# Patient Record
Sex: FEMALE | Race: WHITE | NOT HISPANIC OR LATINO | Employment: UNEMPLOYED | URBAN - METROPOLITAN AREA
[De-identification: names, ages, dates, MRNs, and addresses within clinical notes are randomized per-mention and may not be internally consistent; named-entity substitution may affect disease eponyms.]

---

## 2018-02-12 ENCOUNTER — OFFICE VISIT CONVERTED (OUTPATIENT)
Dept: FAMILY MEDICINE CLINIC | Facility: CLINIC | Age: 53
End: 2018-02-12
Attending: NURSE PRACTITIONER

## 2018-02-12 ENCOUNTER — CONVERSION ENCOUNTER (OUTPATIENT)
Dept: FAMILY MEDICINE CLINIC | Facility: CLINIC | Age: 53
End: 2018-02-12

## 2018-08-14 ENCOUNTER — OFFICE VISIT CONVERTED (OUTPATIENT)
Dept: FAMILY MEDICINE CLINIC | Facility: CLINIC | Age: 53
End: 2018-08-14
Attending: NURSE PRACTITIONER

## 2018-08-14 ENCOUNTER — CONVERSION ENCOUNTER (OUTPATIENT)
Dept: FAMILY MEDICINE CLINIC | Facility: CLINIC | Age: 53
End: 2018-08-14

## 2018-11-15 ENCOUNTER — OFFICE VISIT CONVERTED (OUTPATIENT)
Dept: FAMILY MEDICINE CLINIC | Facility: CLINIC | Age: 53
End: 2018-11-15
Attending: NURSE PRACTITIONER

## 2019-02-12 ENCOUNTER — HOSPITAL ENCOUNTER (OUTPATIENT)
Dept: LAB | Facility: HOSPITAL | Age: 54
Discharge: HOME | End: 2019-02-12
Attending: NURSE PRACTITIONER

## 2019-02-12 LAB
ALBUMIN SERPL-MCNC: 4.2 G/DL (ref 3.5–5)
ALBUMIN/GLOB SERPL: 1.6 {RATIO} (ref 1.4–2.6)
ALP SERPL-CCNC: 63 U/L (ref 53–141)
ALT SERPL-CCNC: 15 U/L (ref 10–40)
ANION GAP SERPL CALC-SCNC: 17 MMOL/L (ref 8–19)
AST SERPL-CCNC: 14 U/L (ref 15–50)
BASOPHILS # BLD AUTO: 0.02 10*3/UL (ref 0–0.2)
BASOPHILS NFR BLD AUTO: 0.44 % (ref 0–3)
BILIRUB SERPL-MCNC: 0.37 MG/DL (ref 0.2–1.3)
BUN SERPL-MCNC: 12 MG/DL (ref 5–25)
BUN/CREAT SERPL: 13 {RATIO} (ref 6–20)
CALCIUM SERPL-MCNC: 9.3 MG/DL (ref 8.7–10.4)
CHLORIDE SERPL-SCNC: 107 MMOL/L (ref 99–111)
CHOLEST SERPL-MCNC: 166 MG/DL (ref 107–200)
CHOLEST/HDLC SERPL: 3 {RATIO} (ref 3–6)
CONV CO2: 23 MMOL/L (ref 22–32)
CONV CREATININE URINE, RANDOM: 83.7 MG/DL (ref 10–300)
CONV MICROALBUM.,U,RANDOM: <12 MG/L (ref 0–20)
CONV TOTAL PROTEIN: 6.9 G/DL (ref 6.3–8.2)
CREAT UR-MCNC: 0.95 MG/DL (ref 0.5–0.9)
EOSINOPHIL # BLD AUTO: 0.13 10*3/UL (ref 0–0.7)
EOSINOPHIL # BLD AUTO: 2.35 % (ref 0–7)
ERYTHROCYTE [DISTWIDTH] IN BLOOD BY AUTOMATED COUNT: 11.6 % (ref 11.5–14.5)
EST. AVERAGE GLUCOSE BLD GHB EST-MCNC: 143 MG/DL
GFR SERPLBLD BASED ON 1.73 SQ M-ARVRAT: >60 ML/MIN/{1.73_M2}
GLOBULIN UR ELPH-MCNC: 2.7 G/DL (ref 2–3.5)
GLUCOSE SERPL-MCNC: 112 MG/DL (ref 65–99)
HBA1C MFR BLD: 14.4 G/DL (ref 12–16)
HBA1C MFR BLD: 6.6 % (ref 3.5–5.7)
HCT VFR BLD AUTO: 42.8 % (ref 37–47)
HDLC SERPL-MCNC: 55 MG/DL (ref 40–60)
LDLC SERPL CALC-MCNC: 96 MG/DL (ref 70–100)
LYMPHOCYTES # BLD AUTO: 2.08 10*3/UL (ref 1–5)
MCH RBC QN AUTO: 30.5 PG (ref 27–31)
MCHC RBC AUTO-ENTMCNC: 33.7 G/DL (ref 33–37)
MCV RBC AUTO: 90.6 FL (ref 81–99)
MICROALBUMIN/CREAT UR: 14.3 MG/G{CRE} (ref 0–35)
MONOCYTES # BLD AUTO: 0.45 10*3/UL (ref 0.2–1.2)
MONOCYTES NFR BLD AUTO: 8.4 % (ref 3–10)
NEUTROPHILS # BLD AUTO: 2.67 10*3/UL (ref 2–8)
NEUTROPHILS NFR BLD AUTO: 49.9 % (ref 30–85)
NRBC BLD AUTO-RTO: 0 % (ref 0–0.01)
OSMOLALITY SERPL CALC.SUM OF ELEC: 295 MOSM/KG (ref 273–304)
PLATELET # BLD AUTO: 244 10*3/UL (ref 130–400)
PMV BLD AUTO: 7.1 FL (ref 7.4–10.4)
POTASSIUM SERPL-SCNC: 4.7 MMOL/L (ref 3.5–5.3)
RBC # BLD AUTO: 4.73 10*6/UL (ref 4.2–5.4)
SODIUM SERPL-SCNC: 142 MMOL/L (ref 135–147)
T4 FREE SERPL-MCNC: 1.3 NG/DL (ref 0.9–1.8)
TRIGL SERPL-MCNC: 77 MG/DL (ref 40–150)
TSH SERPL-ACNC: 1.67 M[IU]/L (ref 0.27–4.2)
VARIANT LYMPHS NFR BLD MANUAL: 38.9 % (ref 20–45)
VLDLC SERPL-MCNC: 15 MG/DL (ref 5–37)
WBC # BLD AUTO: 5.35 10*3/UL (ref 4.8–10.8)

## 2019-02-13 ENCOUNTER — CONVERSION ENCOUNTER (OUTPATIENT)
Dept: FAMILY MEDICINE CLINIC | Facility: CLINIC | Age: 54
End: 2019-02-13

## 2019-02-13 ENCOUNTER — OFFICE VISIT CONVERTED (OUTPATIENT)
Dept: FAMILY MEDICINE CLINIC | Facility: CLINIC | Age: 54
End: 2019-02-13
Attending: NURSE PRACTITIONER

## 2019-05-13 ENCOUNTER — HOSPITAL ENCOUNTER (OUTPATIENT)
Dept: LAB | Facility: HOSPITAL | Age: 54
Discharge: HOME | End: 2019-05-13
Attending: NURSE PRACTITIONER

## 2019-05-13 LAB
EST. AVERAGE GLUCOSE BLD GHB EST-MCNC: 128 MG/DL
HBA1C MFR BLD: 6.1 % (ref 3.5–5.7)

## 2019-05-15 ENCOUNTER — OFFICE VISIT CONVERTED (OUTPATIENT)
Dept: FAMILY MEDICINE CLINIC | Facility: CLINIC | Age: 54
End: 2019-05-15
Attending: NURSE PRACTITIONER

## 2019-07-31 ENCOUNTER — HOSPITAL ENCOUNTER (OUTPATIENT)
Dept: GENERAL RADIOLOGY | Facility: HOSPITAL | Age: 54
Discharge: HOME | End: 2019-07-31
Attending: NURSE PRACTITIONER

## 2019-08-05 ENCOUNTER — OFFICE VISIT CONVERTED (OUTPATIENT)
Dept: ORTHOPEDIC SURGERY | Facility: CLINIC | Age: 54
End: 2019-08-05
Attending: ORTHOPAEDIC SURGERY

## 2019-08-13 ENCOUNTER — HOSPITAL ENCOUNTER (OUTPATIENT)
Dept: PERIOP | Facility: HOSPITAL | Age: 54
Setting detail: HOSPITAL OUTPATIENT SURGERY
Discharge: HOME | End: 2019-08-13
Attending: ORTHOPAEDIC SURGERY

## 2019-08-13 LAB
GLUCOSE BLD-MCNC: 112 MG/DL (ref 65–99)
GLUCOSE BLD-MCNC: 121 MG/DL (ref 65–99)

## 2019-08-26 ENCOUNTER — CONVERSION ENCOUNTER (OUTPATIENT)
Dept: ORTHOPEDIC SURGERY | Facility: CLINIC | Age: 54
End: 2019-08-26

## 2019-08-26 ENCOUNTER — OFFICE VISIT CONVERTED (OUTPATIENT)
Dept: ORTHOPEDIC SURGERY | Facility: CLINIC | Age: 54
End: 2019-08-26
Attending: PHYSICIAN ASSISTANT

## 2019-09-18 ENCOUNTER — OFFICE VISIT CONVERTED (OUTPATIENT)
Dept: ORTHOPEDIC SURGERY | Facility: CLINIC | Age: 54
End: 2019-09-18
Attending: PHYSICIAN ASSISTANT

## 2019-10-09 ENCOUNTER — OFFICE VISIT CONVERTED (OUTPATIENT)
Dept: ORTHOPEDIC SURGERY | Facility: CLINIC | Age: 54
End: 2019-10-09
Attending: PHYSICIAN ASSISTANT

## 2019-10-17 ENCOUNTER — HOSPITAL ENCOUNTER (OUTPATIENT)
Dept: GENERAL RADIOLOGY | Facility: HOSPITAL | Age: 54
Discharge: HOME | End: 2019-10-17
Attending: PHYSICIAN ASSISTANT

## 2019-10-23 ENCOUNTER — OFFICE VISIT CONVERTED (OUTPATIENT)
Dept: ORTHOPEDIC SURGERY | Facility: CLINIC | Age: 54
End: 2019-10-23
Attending: PHYSICIAN ASSISTANT

## 2019-10-31 ENCOUNTER — HOSPITAL ENCOUNTER (OUTPATIENT)
Dept: PERIOP | Facility: HOSPITAL | Age: 54
Setting detail: HOSPITAL OUTPATIENT SURGERY
Discharge: HOME | End: 2019-10-31
Attending: ORTHOPAEDIC SURGERY

## 2019-10-31 LAB — GLUCOSE BLD-MCNC: 118 MG/DL (ref 65–99)

## 2019-11-13 ENCOUNTER — OFFICE VISIT CONVERTED (OUTPATIENT)
Dept: ORTHOPEDIC SURGERY | Facility: CLINIC | Age: 54
End: 2019-11-13

## 2019-11-20 ENCOUNTER — OFFICE VISIT CONVERTED (OUTPATIENT)
Dept: FAMILY MEDICINE CLINIC | Facility: CLINIC | Age: 54
End: 2019-11-20
Attending: NURSE PRACTITIONER

## 2019-11-20 ENCOUNTER — CONVERSION ENCOUNTER (OUTPATIENT)
Dept: FAMILY MEDICINE CLINIC | Facility: CLINIC | Age: 54
End: 2019-11-20

## 2019-12-11 ENCOUNTER — OFFICE VISIT CONVERTED (OUTPATIENT)
Dept: ORTHOPEDIC SURGERY | Facility: CLINIC | Age: 54
End: 2019-12-11
Attending: PHYSICIAN ASSISTANT

## 2020-01-22 ENCOUNTER — OFFICE VISIT CONVERTED (OUTPATIENT)
Dept: ORTHOPEDIC SURGERY | Facility: CLINIC | Age: 55
End: 2020-01-22
Attending: PHYSICIAN ASSISTANT

## 2020-01-28 ENCOUNTER — OFFICE VISIT CONVERTED (OUTPATIENT)
Dept: NEUROLOGY | Facility: CLINIC | Age: 55
End: 2020-01-28
Attending: PSYCHIATRY & NEUROLOGY

## 2020-01-28 ENCOUNTER — CONVERSION ENCOUNTER (OUTPATIENT)
Dept: NEUROLOGY | Facility: CLINIC | Age: 55
End: 2020-01-28

## 2020-02-19 ENCOUNTER — OFFICE VISIT CONVERTED (OUTPATIENT)
Dept: ORTHOPEDIC SURGERY | Facility: CLINIC | Age: 55
End: 2020-02-19
Attending: PHYSICIAN ASSISTANT

## 2020-04-01 ENCOUNTER — TELEMEDICINE CONVERTED (OUTPATIENT)
Dept: ORTHOPEDIC SURGERY | Facility: CLINIC | Age: 55
End: 2020-04-01
Attending: PHYSICIAN ASSISTANT

## 2020-04-29 ENCOUNTER — CONVERSION ENCOUNTER (OUTPATIENT)
Dept: ORTHOPEDIC SURGERY | Facility: CLINIC | Age: 55
End: 2020-04-29

## 2020-04-29 ENCOUNTER — TELEMEDICINE CONVERTED (OUTPATIENT)
Dept: ORTHOPEDIC SURGERY | Facility: CLINIC | Age: 55
End: 2020-04-29
Attending: PHYSICIAN ASSISTANT

## 2020-05-20 ENCOUNTER — OFFICE VISIT CONVERTED (OUTPATIENT)
Dept: ORTHOPEDIC SURGERY | Facility: CLINIC | Age: 55
End: 2020-05-20
Attending: PHYSICIAN ASSISTANT

## 2020-05-20 ENCOUNTER — HOSPITAL ENCOUNTER (OUTPATIENT)
Dept: LAB | Facility: HOSPITAL | Age: 55
Discharge: HOME | End: 2020-05-20
Attending: NURSE PRACTITIONER

## 2020-05-20 ENCOUNTER — OFFICE VISIT CONVERTED (OUTPATIENT)
Dept: FAMILY MEDICINE CLINIC | Facility: CLINIC | Age: 55
End: 2020-05-20
Attending: NURSE PRACTITIONER

## 2020-05-20 LAB
ALBUMIN SERPL-MCNC: 4.6 G/DL (ref 3.5–5)
ALBUMIN/GLOB SERPL: 1.7 {RATIO} (ref 1.4–2.6)
ALP SERPL-CCNC: 69 U/L (ref 53–141)
ALT SERPL-CCNC: 32 U/L (ref 10–40)
ANION GAP SERPL CALC-SCNC: 23 MMOL/L (ref 8–19)
AST SERPL-CCNC: 21 U/L (ref 15–50)
BASOPHILS # BLD AUTO: 0.02 10*3/UL (ref 0–0.2)
BASOPHILS NFR BLD AUTO: 0.3 % (ref 0–3)
BILIRUB SERPL-MCNC: 0.36 MG/DL (ref 0.2–1.3)
BUN SERPL-MCNC: 12 MG/DL (ref 5–25)
BUN/CREAT SERPL: 12 {RATIO} (ref 6–20)
CALCIUM SERPL-MCNC: 9.4 MG/DL (ref 8.7–10.4)
CHLORIDE SERPL-SCNC: 104 MMOL/L (ref 99–111)
CHOLEST SERPL-MCNC: 192 MG/DL (ref 107–200)
CHOLEST/HDLC SERPL: 3.5 {RATIO} (ref 3–6)
CONV ABS IMM GRAN: 0.01 10*3/UL (ref 0–0.2)
CONV CO2: 20 MMOL/L (ref 22–32)
CONV CREATININE URINE, RANDOM: 55.9 MG/DL (ref 10–300)
CONV IMMATURE GRAN: 0.2 % (ref 0–1.8)
CONV MICROALBUM.,U,RANDOM: <12 MG/L (ref 0–20)
CONV TOTAL PROTEIN: 7.3 G/DL (ref 6.3–8.2)
CREAT UR-MCNC: 1.01 MG/DL (ref 0.5–0.9)
DEPRECATED RDW RBC AUTO: 42.7 FL (ref 36.4–46.3)
EOSINOPHIL # BLD AUTO: 0.07 10*3/UL (ref 0–0.7)
EOSINOPHIL # BLD AUTO: 1.1 % (ref 0–7)
ERYTHROCYTE [DISTWIDTH] IN BLOOD BY AUTOMATED COUNT: 12.1 % (ref 11.7–14.4)
EST. AVERAGE GLUCOSE BLD GHB EST-MCNC: 131 MG/DL
GFR SERPLBLD BASED ON 1.73 SQ M-ARVRAT: >60 ML/MIN/{1.73_M2}
GLOBULIN UR ELPH-MCNC: 2.7 G/DL (ref 2–3.5)
GLUCOSE SERPL-MCNC: 104 MG/DL (ref 65–99)
HBA1C MFR BLD: 6.2 % (ref 3.5–5.7)
HCT VFR BLD AUTO: 43.8 % (ref 37–47)
HDLC SERPL-MCNC: 55 MG/DL (ref 40–60)
HGB BLD-MCNC: 13.9 G/DL (ref 12–16)
LDLC SERPL CALC-MCNC: 114 MG/DL (ref 70–100)
LYMPHOCYTES # BLD AUTO: 2.04 10*3/UL (ref 1–5)
LYMPHOCYTES NFR BLD AUTO: 32.2 % (ref 20–45)
MCH RBC QN AUTO: 30.5 PG (ref 27–31)
MCHC RBC AUTO-ENTMCNC: 31.7 G/DL (ref 33–37)
MCV RBC AUTO: 96.1 FL (ref 81–99)
MICROALBUMIN/CREAT UR: 21.5 MG/G{CRE} (ref 0–35)
MONOCYTES # BLD AUTO: 0.39 10*3/UL (ref 0.2–1.2)
MONOCYTES NFR BLD AUTO: 6.2 % (ref 3–10)
NEUTROPHILS # BLD AUTO: 3.81 10*3/UL (ref 2–8)
NEUTROPHILS NFR BLD AUTO: 60 % (ref 30–85)
NRBC CBCN: 0 % (ref 0–0.7)
OSMOLALITY SERPL CALC.SUM OF ELEC: 296 MOSM/KG (ref 273–304)
PLATELET # BLD AUTO: 240 10*3/UL (ref 130–400)
PMV BLD AUTO: 9.5 FL (ref 9.4–12.3)
POTASSIUM SERPL-SCNC: 4 MMOL/L (ref 3.5–5.3)
RBC # BLD AUTO: 4.56 10*6/UL (ref 4.2–5.4)
SODIUM SERPL-SCNC: 143 MMOL/L (ref 135–147)
T4 FREE SERPL-MCNC: 1.8 NG/DL (ref 0.9–1.8)
TRIGL SERPL-MCNC: 114 MG/DL (ref 40–150)
TSH SERPL-ACNC: 0.75 M[IU]/L (ref 0.27–4.2)
VLDLC SERPL-MCNC: 23 MG/DL (ref 5–37)
WBC # BLD AUTO: 6.34 10*3/UL (ref 4.8–10.8)

## 2020-06-22 ENCOUNTER — HOSPITAL ENCOUNTER (OUTPATIENT)
Dept: ORTHOPEDIC SURGERY | Facility: CLINIC | Age: 55
Setting detail: RECURRING SERIES
Discharge: STILL ADMITTED OR ADMINISTRATIVE | End: 2020-09-29
Attending: ORTHOPAEDIC SURGERY

## 2020-07-15 ENCOUNTER — OFFICE VISIT CONVERTED (OUTPATIENT)
Dept: ORTHOPEDIC SURGERY | Facility: CLINIC | Age: 55
End: 2020-07-15
Attending: PHYSICIAN ASSISTANT

## 2020-09-11 ENCOUNTER — OFFICE VISIT CONVERTED (OUTPATIENT)
Dept: ORTHOPEDIC SURGERY | Facility: CLINIC | Age: 55
End: 2020-09-11
Attending: ORTHOPAEDIC SURGERY

## 2020-09-11 ENCOUNTER — CONVERSION ENCOUNTER (OUTPATIENT)
Dept: ORTHOPEDIC SURGERY | Facility: CLINIC | Age: 55
End: 2020-09-11

## 2020-10-02 ENCOUNTER — OFFICE VISIT CONVERTED (OUTPATIENT)
Dept: ORTHOPEDIC SURGERY | Facility: CLINIC | Age: 55
End: 2020-10-02
Attending: PHYSICIAN ASSISTANT

## 2020-10-05 ENCOUNTER — HOSPITAL ENCOUNTER (OUTPATIENT)
Dept: ORTHOPEDIC SURGERY | Facility: CLINIC | Age: 55
Setting detail: RECURRING SERIES
Discharge: HOME | End: 2020-11-17
Attending: ORTHOPAEDIC SURGERY

## 2020-10-23 ENCOUNTER — OFFICE VISIT CONVERTED (OUTPATIENT)
Dept: ORTHOPEDIC SURGERY | Facility: CLINIC | Age: 55
End: 2020-10-23
Attending: ORTHOPAEDIC SURGERY

## 2020-11-09 ENCOUNTER — HOSPITAL ENCOUNTER (OUTPATIENT)
Dept: GENERAL RADIOLOGY | Facility: HOSPITAL | Age: 55
Discharge: HOME | End: 2020-11-09
Attending: ORTHOPAEDIC SURGERY

## 2020-11-12 ENCOUNTER — OFFICE VISIT CONVERTED (OUTPATIENT)
Dept: ORTHOPEDIC SURGERY | Facility: CLINIC | Age: 55
End: 2020-11-12
Attending: ORTHOPAEDIC SURGERY

## 2020-11-23 ENCOUNTER — TELEMEDICINE CONVERTED (OUTPATIENT)
Dept: FAMILY MEDICINE CLINIC | Facility: CLINIC | Age: 55
End: 2020-11-23
Attending: NURSE PRACTITIONER

## 2020-12-09 ENCOUNTER — HOSPITAL ENCOUNTER (OUTPATIENT)
Dept: PREADMISSION TESTING | Facility: HOSPITAL | Age: 55
Discharge: HOME | End: 2020-12-09
Attending: ORTHOPAEDIC SURGERY

## 2020-12-11 LAB — SARS-COV-2 RNA SPEC QL NAA+PROBE: NOT DETECTED

## 2020-12-14 ENCOUNTER — HOSPITAL ENCOUNTER (OUTPATIENT)
Dept: PERIOP | Facility: HOSPITAL | Age: 55
Setting detail: HOSPITAL OUTPATIENT SURGERY
Discharge: HOME | End: 2020-12-14
Attending: ORTHOPAEDIC SURGERY

## 2020-12-14 LAB
ANION GAP SERPL CALC-SCNC: 12 MMOL/L (ref 8–19)
BUN SERPL-MCNC: 13 MG/DL (ref 5–25)
BUN/CREAT SERPL: 17 {RATIO} (ref 6–20)
CALCIUM SERPL-MCNC: 9.2 MG/DL (ref 8.7–10.4)
CHLORIDE SERPL-SCNC: 106 MMOL/L (ref 99–111)
CONV CO2: 26 MMOL/L (ref 22–32)
CREAT UR-MCNC: 0.77 MG/DL (ref 0.5–0.9)
GFR SERPLBLD BASED ON 1.73 SQ M-ARVRAT: >60 ML/MIN/{1.73_M2}
GLUCOSE SERPL-MCNC: 119 MG/DL (ref 65–99)
OSMOLALITY SERPL CALC.SUM OF ELEC: 291 MOSM/KG (ref 273–304)
POTASSIUM SERPL-SCNC: 4.3 MMOL/L (ref 3.5–5.3)
SODIUM SERPL-SCNC: 140 MMOL/L (ref 135–147)

## 2020-12-29 ENCOUNTER — CONVERSION ENCOUNTER (OUTPATIENT)
Dept: ORTHOPEDIC SURGERY | Facility: CLINIC | Age: 55
End: 2020-12-29

## 2020-12-29 ENCOUNTER — OFFICE VISIT CONVERTED (OUTPATIENT)
Dept: ORTHOPEDIC SURGERY | Facility: CLINIC | Age: 55
End: 2020-12-29
Attending: ORTHOPAEDIC SURGERY

## 2020-12-30 ENCOUNTER — HOSPITAL ENCOUNTER (OUTPATIENT)
Dept: PHYSICAL THERAPY | Facility: CLINIC | Age: 55
Setting detail: RECURRING SERIES
Discharge: HOME | End: 2021-03-17
Attending: ORTHOPAEDIC SURGERY

## 2021-01-29 ENCOUNTER — OFFICE VISIT CONVERTED (OUTPATIENT)
Dept: ORTHOPEDIC SURGERY | Facility: CLINIC | Age: 56
End: 2021-01-29
Attending: PHYSICIAN ASSISTANT

## 2021-03-12 ENCOUNTER — CONVERSION ENCOUNTER (OUTPATIENT)
Dept: ORTHOPEDIC SURGERY | Facility: CLINIC | Age: 56
End: 2021-03-12

## 2021-03-12 ENCOUNTER — OFFICE VISIT CONVERTED (OUTPATIENT)
Dept: ORTHOPEDIC SURGERY | Facility: CLINIC | Age: 56
End: 2021-03-12
Attending: PHYSICIAN ASSISTANT

## 2021-04-06 ENCOUNTER — OFFICE VISIT CONVERTED (OUTPATIENT)
Dept: ORTHOPEDIC SURGERY | Facility: CLINIC | Age: 56
End: 2021-04-06
Attending: PHYSICIAN ASSISTANT

## 2021-04-19 ENCOUNTER — HOSPITAL ENCOUNTER (OUTPATIENT)
Dept: MRI IMAGING | Facility: HOSPITAL | Age: 56
Discharge: HOME | End: 2021-04-19
Attending: PHYSICIAN ASSISTANT

## 2021-04-23 ENCOUNTER — OFFICE VISIT CONVERTED (OUTPATIENT)
Dept: ORTHOPEDIC SURGERY | Facility: CLINIC | Age: 56
End: 2021-04-23
Attending: PHYSICIAN ASSISTANT

## 2021-04-23 ENCOUNTER — CONVERSION ENCOUNTER (OUTPATIENT)
Dept: ORTHOPEDIC SURGERY | Facility: CLINIC | Age: 56
End: 2021-04-23

## 2021-05-10 NOTE — H&P
History and Physical      Patient Name: Amanda Cason   Patient ID: [id number]   Sex: Female   YOB: 1965    Primary Care Provider: Birdie CARDONA   Referring Provider: Phillip Cano MD    Visit Date: September 11, 2020    Provider: Carrington Shell MD   Location: [facility]   Location Address: [address] [place] [address]  [id number]   Location Phone: [phone]          Chief Complaint  · Left shoulder pain      History Of Present Illness  Amanda Cason is a 54 year old /White female who presents today to [facility].      The patient presents here today for evaluation of her left shoulder pain.  She states she slipped and fell in the bed of a truck yesterday.  She was seen at [facility] and had x-rays and gave the patient a sling. She is wearing the sling today.  We reviewed the images today with the patient. She is overall healthy and has no other complaints.       Past Medical History  Appendicitis; Diabetes; Diabetes mellitus type 2, noninsulin dependent; Diabetes mellitus, type 2; Diabetes Mellitus, Type II; Diabetic Eye Exam Last Completed; Diabetic Foot Exam Last Completed; Dry eye syndrome; Essential hypertension; GERD (gastroesophageal reflux disease); Glaucoma; Hyperlipidemia; Pap smear for cervical cancer screening; Pap Smear for Vaginal Cancer Screening; Reflux; Screening Mammogram; Uterine fibroid         Past Surgical History  Appendectomy; Colonoscopy; EAR Surgery; Tubal ligation         Medication List  diclofenac sodium 75 mg oral tablet,delayed release (DR/EC); krill oil 525-75-81-50 mg oral capsule; lisinopril 2.5 mg oral tablet; Lumigan 0.01 % ophthalmic drops; nabumetone 750 mg oral tablet; Percocet 7.5-325 mg oral tablet; rosuvastatin 10 mg oral tablet; Xigduo XR 10-1,000 mg oral tablet, IR - ER, biphasic 24hr         Allergy List  NO KNOWN DRUG ALLERGIES       Allergies Reconciled  Family Medical History  Heart Disease;  "Diabetes, unspecified type; Breast Neoplasm; Diabetes Mellitus, Type II; Family history of diabetes mellitus         Reproductive History   2 Para 2 0 0 0       Social History  Alcohol (Never); ; No known infection risk; Recreational Drug Use (Never); Tobacco (Never)         Review of Systems  · Constitutional  o Denies  o : fever, chills, weight loss  · Cardiovascular  o Denies  o : chest pain, shortness of breath  · Gastrointestinal  o Denies  o : liver disease, heartburn, nausea, blood in stools  · Genitourinary  o Denies  o : painful urination, blood in urine  · Integument  o Denies  o : rash, itching  · Neurologic  o Denies  o : headache, weakness, loss of consciousness  · Musculoskeletal  o Denies  o : painful, swollen joints  · Psychiatric  o Denies  o : drug/alcohol addiction, anxiety, depression      Vitals  Date Time BP Position Site L\R Cuff Size HR RR TEMP (F) WT  HT  BMI kg/m2 BSA m2 O2 Sat        2020 10:10 AM         140lbs 0oz 5'  4\" 24.03 1.69           Physical Examination  · Constitutional  o Appearance  o : well developed, well-nourished, no obvious deformities present  · Head and Face  o Head  o :   § Inspection  § : normocephalic  o Face  o :   § Inspection  § : no facial lesions  · Eyes  o Conjunctivae  o : conjunctivae normal  o Sclerae  o : sclerae white  · Ears, Nose, Mouth and Throat  o Ears  o :   § External Ears  § : appearance within normal limits  § Hearing  § : intact  o Nose  o :   § External Nose  § : appearance normal  · Neck  o Inspection/Palpation  o : normal appearance  o Range of Motion  o : full range of motion  · Respiratory  o Respiratory Effort  o : breathing unlabored  o Inspection of Chest  o : normal appearance  o Auscultation of Lungs  o : no audible wheezing or rales  · Cardiovascular  o Heart  o : regular rate  · Gastrointestinal  o Abdominal Examination  o : soft and non-tender  · Skin and Subcutaneous Tissue  o General Inspection  o : intact, " no rashes  · Psychiatric  o General  o : Alert and oriented x3  o Judgement and Insight  o : judgment and insight intact  o Mood and Affect  o : mood normal, affect appropriate  · Left Shoulder  o Inspection  o : No bruising minimal swelling. Tenderness over the anterior lateral shoulder. No tenderness over elbow. Sensation intact. ROM limited secondary to pain  · Imaging  o Imaging  o : X-ray [facility] 9/2020: Minimally displaced fracture of the greater tuberosity of the proximal humerus.           Assessment  · Left shoulder pain, unspecified chronicity     719.41/M25.512  · Greater tuberosity fracture of proximal humerus     812.20/S42.309A      Plan  · Medications  o Medications have been Reconciled  o Transition of Care or Provider Policy  · Instructions  o X-rays reviewed by Dr. Shell.  o Reviewed the patient's Past Medical, Social, and Family history as well as the ROS at today's visit, no changes.  o Call or return if worsening symptoms.  o Follow Up in 3 weeks.  o The above service was scribed by Dori Romero on my behalf and I attest to the accuracy of the note. jsb  o Discussed conservative vs operative treatment with the patient. We recommended nonsurgical treatment to the patient. Plan for patient to continue the sling and follow up in 3 weeks to recheck with shoulder x-rays. Will consider physical therapy at this time. Prescription for Percocet was given today. We advised the patient to do ROM exercises on elbow.   o Electronically Identified Patient Education Materials Provided Electronically            Electronically Signed by: Dori Romero MA -Author on September 14, 2020 01:15:25 PM  Electronically Co-signed by: Carrington Shell MD -Reviewer on September 14, 2020 09:36:40 PM

## 2021-05-11 ENCOUNTER — OFFICE VISIT CONVERTED (OUTPATIENT)
Dept: ORTHOPEDIC SURGERY | Facility: CLINIC | Age: 56
End: 2021-05-11
Attending: ORTHOPAEDIC SURGERY

## 2021-05-11 NOTE — H&P
"   History and Physical      Patient Name: Amanda Cason   Patient ID: [id number]   Sex: Female   YOB: 1965    Primary Care Provider: Birdie CARDONA   Referring Provider: Phillip Cano MD    Visit Date: April 6, 2021    Provider: Genaro Victoria PA-C   Location: [facility]   Location Address: [address] [place] [address]  [id number]   Location Phone: [phone]          Chief Complaint  · Left hand pain  · Left shoulder pain      History Of Present Illness  Amanda Cason is a 55 year old /White female who presents today to [facility]. Patient presents for follow-up evaluation of left shoulder arthroscopic subacromial decompression, mini open rotator cuff repair, 12/14/2020. Patient states since her last visit her hand pain and swelling has worsened, she points to the center palm of her hand as her area of a \"sharp pain\". Patient states she has difficulty with range of motion of her fingers and thumb, cannot make a full fist. Patient has been seeing her physical therapist, she states that the therapist advised to pause physical therapy until she gets her hand and wrist evaluated more thoroughly. Patient states her shoulder pain is controlled, she states she still has limited range of motion of her left shoulder. Patient denies numbness and tingling of her left upper extremity, she states she has a history of right shoulder surgery which caused \"nerve damage \"to her right upper extremity. Patient states her right hand also had swelling for up to a year after her right shoulder surgery. Patient states she has a history of neck pain, denies injury or surgery to cervical spine.       Past Medical History  Appendicitis; Diabetes; Diabetes mellitus type 2, noninsulin dependent; Diabetes mellitus, type 2; Diabetes Mellitus, Type II; Diabetic Eye Exam Last Completed; Diabetic Foot Exam Last Completed; Dry eye syndrome; Essential hypertension; GERD " "(gastroesophageal reflux disease); Glaucoma; Hyperlipidemia; Pap smear for cervical cancer screening; Pap Smear for Vaginal Cancer Screening; Reflux; Screening Mammogram; Uterine fibroid         Past Surgical History  Appendectomy; Colonoscopy; EAR Surgery; Tubal ligation         Medication List  diclofenac sodium 75 mg oral tablet,delayed release (DR/EC); gabapentin 300 mg oral capsule; krill oil 697-60-16-50 mg oral capsule; lisinopril 2.5 mg oral tablet; Norco 7.5-325 mg oral tablet; ROSUVASTATIN CALCIUM 10 MG TAB         Allergy List  NO KNOWN DRUG ALLERGIES       Allergies Reconciled  Family Medical History  Heart Disease; Diabetes, unspecified type; Breast Neoplasm; Diabetes Mellitus, Type II; Family history of diabetes mellitus         Reproductive History   2 Para 2 0 0 0       Social History  Alcohol (Never); ; No known infection risk; Recreational Drug Use (Never); Tobacco (Never)         Immunizations  Name Date Admin   Influenza Refused         Review of Systems  · Constitutional  o Denies  o : fever, chills, weight loss  · Cardiovascular  o Denies  o : chest pain, shortness of breath  · Gastrointestinal  o Denies  o : liver disease, heartburn, nausea, blood in stools  · Genitourinary  o Denies  o : painful urination, blood in urine  · Integument  o Denies  o : rash, itching  · Neurologic  o Denies  o : headache, weakness, loss of consciousness  · Musculoskeletal  o Denies  o : painful, swollen joints  · Psychiatric  o Denies  o : drug/alcohol addiction, anxiety, depression      Vitals  Date Time BP Position Site L\R Cuff Size HR RR TEMP (F) WT  HT  BMI kg/m2 BSA m2 O2 Sat FR L/min FiO2 HC       2021 10:32 AM      76 - R   149lbs 6oz 5'  4\" 25.64 1.75 97 %            Physical Examination  · Constitutional  o Appearance  o : well developed, well-nourished, no obvious deformities present  · Head and Face  o Head  o :   § Inspection  § : normocephalic  o Face  o :   § Inspection  § : no " facial lesions  · Eyes  o Conjunctivae  o : conjunctivae normal  o Sclerae  o : sclerae white  · Ears, Nose, Mouth and Throat  o Ears  o :   § External Ears  § : appearance within normal limits  § Hearing  § : intact  o Nose  o :   § External Nose  § : appearance normal  · Neck  o Inspection/Palpation  o : normal appearance  o Range of Motion  o : full range of motion  · Respiratory  o Respiratory Effort  o : breathing unlabored  o Inspection of Chest  o : normal appearance  o Auscultation of Lungs  o : no audible wheezing or rales  · Cardiovascular  o Heart  o : regular rate  · Gastrointestinal  o Abdominal Examination  o : soft and non-tender  · Skin and Subcutaneous Tissue  o General Inspection  o : intact, no rashes  · Psychiatric  o General  o : Alert and oriented x3  o Judgement and Insight  o : judgment and insight intact  o Mood and Affect  o : mood normal, affect appropriate  · Cervical Spine  o Inspection  o : No midline tenderness, full range of motion with flexion, extension, rotation and lateral movements. Mild paraspinal tenderness.  · Left Shoulder  o Inspection  o : Incisions are well-healed, no erythema, no ecchymosis, no swelling, no signs of infection, active forward elevation 90, active abduction 90, passive forward elevation 150, passive abduction 120, pain with range of motion.   · Left Hand  o Inspection  o : Left hand and wrist: Mild swelling to the fingers and thumb. Limited flexion and extension of fingers and thumb. Neurovascularly intact, patient unable to make a full fist due to thumb and finger limited range of motion. 2+ capillary refill, 2+ radial and ulnar pulses. Pain with range of motion of the thumb, negative grind test, negative snuffbox tenderness, wrist range of motion intact, mild pain with wrist range of motion.  · Imaging  o Imaging  o : In Office ProceduresView : AP/LATERAL Site : left, hand Indication : Left arm pain Study : X-rays ordered, taken in the office, and  reviewed today. Xray : Mild degenerative changes, no fracture, no dislocation Comparative Data : No comparative data found           Assessment  · Aftercare following surgery of left shoulder arthroscopic subacromial decompression, mini open rotator cuff repair, 12/14/2020     V54.81  · Arthralgia of left hand     719.44/M25.542  · Left shoulder pain, unspecified chronicity     719.41/M25.512  · Neck pain     723.1/M54.2      Plan  · Medications  o Medications have been Reconciled  o Transition of Care or Provider Policy  · Instructions  o Reviewed the patient's Past Medical, Social, and Family history as well as the ROS at today's visit, no changes.  o Call or return if worsening symptoms.  o Follow up after MRI.  o Discussed treatment options with patient, she was advised we will order MRI of her cervical spine, left shoulder, left wrist and hand for further evaluation. We discussed that we could order nerve conduction study for left upper extremity, patient refused. I called the patient and physical therapist, his name is Chong at [facility] physical therapy in [place], he states that the patient has not been tolerant of physical therapy since her surgery, he states that she admits to being noncompliant with doing home exercises when at home, he states that they did some nerve evaluation for her left hand and wrist, he states that the patient received some benefit from this but states that her pain came back and worsened and he advised that she pause physical therapy until further evaluation. Patient to follow-up after MRIs.            Electronically Signed by: RAY Cody-PING -Author on April 6, 2021 11:22:21 AM  Electronically Co-signed by: Carrington Shell MD -Reviewer on April 6, 2021 09:32:17 PM

## 2021-05-12 NOTE — PROGRESS NOTES
"   Quick Note      Patient Name: Amanda Cason   Patient ID: [id number]   Sex: Female   YOB: 1965    Primary Care Provider: Birdie CARDONA   Referring Provider: Phillip Cano MD    Visit Date: April 1, 2020    Provider: Maria Del Rosario Mast PA-C   Location: [facility]   Location Address: [address] [place] [address]  [id number]   Location Phone: [phone]          History Of Present Illness  TELEHEALTH VISIT  Chief Complaint: Right shoulder pain   Amanda Cason is a 54 year old /White female who is presenting for evaluation via telehealth visit. Verbal consent obtained before beginning visit.   Provider spent 8 minutes with the patient during telehealth visit.   The following staff were present during this visit: Maria Del Rosario Mast PA-C   Past Medical History/Overview of Patient Symptoms     She is s/p right SAD/DC/RCR 10/31/19 by Dr. Cano. She states her shoulder is doing well. She complains of right hand pain and parasthesias since her surgery. EMG revealed mild CTS bilaterally. Her right wrist was injected last visit, but she states no improvement. She has been using brace and doing HEP. She stopped PT due to health concerns.   Assessment: history of reapair of rotator cuff, right CTS  Plan: Medrol dosepack, continue HEP, bracing for right wrist, Follow up 4 weeks.       Vitals  Date Time BP Position Site L\R Cuff Size HR RR TEMP (F) WT  HT  BMI kg/m2 BSA m2 O2 Sat HC       04/01/2020 07:59 AM         145lbs 0oz 5'  4\" 24.89 1.72               Plan  · Medications  o Medications have been Reconciled  o Transition of Care or Provider Policy  · Instructions  o Plan Of Care:             Electronically Signed by: Maria Del Rosario Mast PA-C -Author on April 1, 2020 08:14:19 AM  "

## 2021-05-12 NOTE — PROGRESS NOTES
Progress Note      Patient Name: Amanda Cason   Patient ID: [id number]   Sex: Female   YOB: 1965    Primary Care Provider: Birdie CARDONA   Referring Provider: Phillip Cano MD    Visit Date: April 29, 2020    Provider: Maria Del Rosario Mast PA-C   Location: [facility]   Location Address: [address] [place] [address]  [id number]   Location Phone: [phone]          Chief Complaint  · Follow up right shoulder pain      History Of Present Illness  Video Conferencing Visit  Amanda Cason is a 54 year old /White female who is presenting for evaluation via video conferencing. Verbal consent obtained before beginning visit.   The following staff were present during this visit: Maria Del Rosario Mast PA-C      She is s/p right arthroscopic SAD/DC and mini open RCR 10/31/19 by Dr. Cano. Her shoulder is improving. Her main concern is right hand swelling and weakness ongoing since December, without inciting injury. EMG revealed mild CTS and CT injection did not improve her symptoms. She states increased symptoms with shoulder flexion and abduction exercises. She denies injury/pain in her neck. She failed to improve with Medrol Dose pack, Voltaren. She stopped PT due to her 's immunocompromised status. She continues HEP.       Past Medical History  Appendicitis; Diabetes; Diabetes mellitus type 2, noninsulin dependent; Diabetes mellitus, type 2; Diabetes mellitus, type II; Diabetic Eye Exam Last Completed; Diabetic Foot Exam Last Completed; Dry eye syndrome; Essential hypertension; GERD (gastroesophageal reflux disease); Glaucoma; Hyperlipidemia; Pap smear for cervical cancer screening; Pap Smear for Vaginal Cancer Screening; Reflux; Screening Mammogram; Uterine fibroid         Past Surgical History  Appendectomy; Colonoscopy; EAR Surgery; Tubal ligation         Medication List  diclofenac sodium 75 mg oral tablet,delayed release (DR/EC); krill oil 420-02-68-50 mg oral capsule; lisinopril 2.5 mg  "oral tablet; Lumigan 0.01 % ophthalmic drops; Medrol (Zachary) 4 mg oral tablets,dose pack; rosuvastatin 10 mg oral tablet; Xigduo XR 10-1,000 mg oral tablet, IR - ER, biphasic 24hr         Allergy List  NO KNOWN DRUG ALLERGIES       Allergies Reconciled  Family Medical History  Heart Disease; Diabetes, unspecified type; Breast Neoplasm; Diabetes mellitus, type II; Family history of diabetes mellitus         Reproductive History   2 Para 2 0 0 0       Social History  Alcohol (Never); ; No known infection risk; Recreational Drug Use (Never); Tobacco (Never)         Review of Systems  · Constitutional  o Denies  o : fever, chills, weight loss  · Cardiovascular  o Denies  o : chest pain, shortness of breath  · Gastrointestinal  o Denies  o : liver disease, heartburn, nausea, blood in stools  · Genitourinary  o Denies  o : painful urination, blood in urine  · Integument  o Denies  o : rash, itching  · Neurologic  o Denies  o : headache, weakness, loss of consciousness  · Musculoskeletal  o Admits  o : painful, swollen joints  · Psychiatric  o Denies  o : drug/alcohol addiction, anxiety, depression      Vitals  Date Time BP Position Site L\R Cuff Size HR RR TEMP (F) WT  HT  BMI kg/m2 BSA m2 O2 Sat        2020 08:47 AM         145lbs 0oz 5'  4\" 24.89 1.72           Physical Examination  · Constitutional  o Appearance  o : well developed, well-nourished, no obvious deformities present  · Head and Face  o Head  o :   § Inspection  § : normocephalic  o Face  o :   § Inspection  § : no facial lesions  · Eyes  o Conjunctivae  o : conjunctivae normal  o Sclerae  o : sclerae white  · Ears, Nose, Mouth and Throat  o Ears  o :   § External Ears  § : appearance within normal limits  § Hearing  § : intact  o Nose  o :   § External Nose  § : appearance normal  · Neck  o Inspection/Palpation  o : normal appearance  o Range of Motion  o : full range of motion  · Respiratory  o Respiratory Effort  o : breathing " unlabored  o Inspection of Chest  o : normal appearance  o Auscultation of Lungs  o : no audible wheezing or rales  · Skin and Subcutaneous Tissue  o General Inspection  o : intact, no rashes  · Psychiatric  o General  o : Alert and oriented x3  o Judgement and Insight  o : judgment and insight intact  o Mood and Affect  o : mood normal, affect appropriate  · Cervical Spine  o Inspection  o : no swelling, no deformity, Full ROM.  · Right Shoulder  o Inspection  o : Well healed incisions. Near full ROM. Hand ROM decreased.           Assessment  · Pain: Shoulder     719.41/M25.519  · History of repair of rotator cuff     V15.29/Z98.890      Plan  · Medications  o Medications have been Reconciled  o Transition of Care or Provider Policy  · Instructions  o Reviewed the patient's Past Medical, Social, and Family history as well as the ROS at today's visit, no changes.  o Call or return if worsening symptoms.  o Try Relafen, continue HEP. Patient declines formal PT and tele PT.   o Electronically Identified Patient Education Materials Provided Electronically            Electronically Signed by: RAY Martin-C -Author on April 29, 2020 09:12:09 AM  Electronically Co-signed by: Phillip Cano MD -Reviewer on April 30, 2020 05:47:00 PM

## 2021-05-13 NOTE — PROGRESS NOTES
Progress Note      Patient Name: Amanda Cason   Patient ID: [id number]   Sex: Female   YOB: 1965    Primary Care Provider: Birdie CARDONA   Referring Provider: Phillip Cano MD    Visit Date: November 12, 2020    Provider: Carrington Shell MD   Location: [facility]   Location Address: [address] [place] [address]  [id number]   Location Phone: [phone]          Chief Complaint  · Followup left shoulder MRI results.      History Of Present Illness  Amanda Cason is a 54 year old /White female who presents today for followup of her left shoulder. It has now been 2 months since her injury. She continues to have a severe amount of pain to the left shoulder. She says the pain is very unbearable. She has been trying to use the arm some. She recently had an MRI. Pain with worse with activity, but also present at night. She has been having to require narcotic pain medication.       Past Medical History  Appendicitis; Diabetes; Diabetes mellitus type 2, noninsulin dependent; Diabetes mellitus, type 2; Diabetes Mellitus, Type II; Diabetic Eye Exam Last Completed; Diabetic Foot Exam Last Completed; Dry eye syndrome; Essential hypertension; GERD (gastroesophageal reflux disease); Glaucoma; Hyperlipidemia; Pap smear for cervical cancer screening; Pap Smear for Vaginal Cancer Screening; Reflux; Screening Mammogram; Uterine fibroid         Past Surgical History  Appendectomy; Colonoscopy; EAR Surgery; Tubal ligation         Medication List  diclofenac sodium 75 mg oral tablet,delayed release (DR/EC); krill oil 645-71-29-50 mg oral capsule; lisinopril 2.5 mg oral tablet; Lumigan 0.01 % ophthalmic drops; nabumetone 750 mg oral tablet; Norco 7.5-325 mg oral tablet; Percocet 7.5-325 mg oral tablet; rosuvastatin 10 mg oral tablet; Xigduo XR 10-1,000 mg oral tablet, IR - ER, biphasic 24hr; XIGDUO XR 10 MG-1,000 MG TAB         Allergy List  NO KNOWN DRUG ALLERGIES         Family Medical  "History  Heart Disease; Diabetes, unspecified type; Breast Neoplasm; Diabetes Mellitus, Type II; Family history of diabetes mellitus         Reproductive History   2 Para 2 0 0 0       Social History  Alcohol (Never); ; No known infection risk; Recreational Drug Use (Never); Tobacco (Never)         Review of Systems  · Constitutional  o Denies  o : fever, chills, weight loss  · Cardiovascular  o Denies  o : chest pain, shortness of breath  · Gastrointestinal  o Denies  o : liver disease, heartburn, nausea, blood in stools  · Genitourinary  o Denies  o : painful urination, blood in urine  · Integument  o Denies  o : rash, itching  · Neurologic  o Denies  o : headache, weakness, loss of consciousness  · Musculoskeletal  o Admits  o : painful, swollen joints  · Psychiatric  o Denies  o : drug/alcohol addiction, anxiety, depression      Vitals  Date Time BP Position Site L\R Cuff Size HR RR TEMP (F) WT  HT  BMI kg/m2 BSA m2 O2 Sat FR L/min FiO2        2020 08:23 AM         133lbs 4oz 5'  4\" 22.87 1.65             Physical Examination  · Constitutional  o Appearance  o : well developed, well-nourished, no obvious deformities present  · Head and Face  o Head  o :   § Inspection  § : normocephalic  o Face  o :   § Inspection  § : no facial lesions  · Eyes  o Conjunctivae  o : conjunctivae normal  o Sclerae  o : sclerae white  · Ears, Nose, Mouth and Throat  o Ears  o :   § External Ears  § : appearance within normal limits  § Hearing  § : intact  o Nose  o :   § External Nose  § : appearance normal  · Neck  o Inspection/Palpation  o : normal appearance  o Range of Motion  o : full range of motion  · Respiratory  o Respiratory Effort  o : breathing unlabored  o Inspection of Chest  o : normal appearance  o Auscultation of Lungs  o : no audible wheezing or rales  · Cardiovascular  o Heart  o : regular rate  · Gastrointestinal  o Abdominal Examination  o : soft and non-tender  · Skin and Subcutaneous " Tissue  o General Inspection  o : intact, no rashes  · Psychiatric  o General  o : Alert and oriented x3  o Judgement and Insight  o : judgment and insight intact  o Mood and Affect  o : mood normal, affect appropriate  · Left Shoulder  o Inspection  o : Neurovascularly intact. Still tenderness to the lateral shoulder. No significant swelling or bruising. Decreased range of motion, active and passive. Active range of motion to only about 80. Abduction 80. IR to buttock. ER 60. Strength 4- /5. Pain and guarding with range of motion.   · Imaging  o Imaging  o : MRI of left shoulder performed at [facility] on 11/09/2020 revealed: 1) Nondisplaced fracture along the base of the humeral greater tuberosity. Significant callus formation is not demonstrated by MRI; 2) Small partial-thickness articular surface tear of the distal supraspinatus tendon; 3) Mild supraspinatus tendinopathy.           Assessment  · Left proximal humerus fracture     812.20/S42.309A  · Left shoulder pain, unspecified chronicity     719.41/M25.512  · Left shoulder rotator cuff tear     840.4/M75.100      Plan  · Medications  o Medications have been Reconciled  o Transition of Care or Provider Policy  · Instructions  o Reviewed the patient's Past Medical, Social, and Family history as well as the ROS at today's visit, no changes.  o Call or return if worsening symptoms.  o Discussed surgery.  o Discussed risks and benefits of arthroscopic rotator cuff repair versus mini open repair and reduction or excision of greater tuberosity fracture fragment. Risks and benefits discussed with the patient include, but are not limited to, infection, bleeding, death, blood clots, lung problems, heart attacks, possible damage to neurovascular structures, aesthetic deformity, and possible need for future surgeries, among others. Patient understands the risks and benefits, and wishes to proceed. Patient will follow up in the clinic  postoperatively.   o Surgery pamphlet given.  o Note transcribed by Angelica Browne. trever  o Discussed treatment options with the patient. She continues to have severe symptoms. She was given a limited amount of pain medication today. She wishes to undergo surgical treatment.             Electronically Signed by: Angelica Browne-, Other -Author on November 14, 2020 02:29:57 PM  Electronically Co-signed by: Carrington Shell MD -Reviewer on November 15, 2020 08:41:41 AM

## 2021-05-13 NOTE — PROGRESS NOTES
Progress Note      Patient Name: Amanda Cason   Patient ID: [id number]   Sex: Female   YOB: 1965    Primary Care Provider: Birdie CARDONA   Referring Provider: Phillip Cano MD    Visit Date: May 20, 2020    Provider: Maria Del Rosario Mast PA-C   Location: [facility]   Location Address: [address] [place] [address]  [id number]   Location Phone: [phone]          Chief Complaint  · Right shoulder pain  · Right hand pain      History Of Present Illness  Amanda Cason is a 54 year old /White female who presents today to [facility].      She is s/p right arthroscopic SAD/DC and mini open RCR 10/31/19 by Dr. Cano. Her shoulder is improving. Her main concern is right hand swelling and weakness ongoing since December, without inciting injury. EMG revealed mild CTS and CT injection did not improve her symptoms. She states increased symptoms with shoulder flexion and abduction exercises. She denies injury/pain in her neck. She failed to improve with Medrol Dose pack, Voltaren. She stopped PT due to her 's immunocompromised status. She continues HEP for rotator cuff repair and CTS.             Past Medical History  Appendicitis; Diabetes; Diabetes mellitus type 2, noninsulin dependent; Diabetes mellitus, type 2; Diabetes mellitus, type II; Diabetic Eye Exam Last Completed; Diabetic Foot Exam Last Completed; Dry eye syndrome; Essential hypertension; GERD (gastroesophageal reflux disease); Glaucoma; Hyperlipidemia; Pap smear for cervical cancer screening; Pap Smear for Vaginal Cancer Screening; Reflux; Screening Mammogram; Uterine fibroid         Past Surgical History  Appendectomy; Colonoscopy; EAR Surgery; Tubal ligation         Medication List  diclofenac sodium 75 mg oral tablet,delayed release (DR/EC); krill oil 275-57-54-50 mg oral capsule; lisinopril 2.5 mg oral tablet; Lumigan 0.01 % ophthalmic drops; nabumetone 750 mg oral tablet; rosuvastatin 10 mg oral tablet;  "Xigduo XR 10-1,000 mg oral tablet, IR - ER, biphasic 24hr         Allergy List  NO KNOWN DRUG ALLERGIES         Family Medical History  Heart Disease; Diabetes, unspecified type; Breast Neoplasm; Diabetes mellitus, type II; Family history of diabetes mellitus         Reproductive History   2 Para 2 0 0 0       Social History  Alcohol (Never); ; No known infection risk; Recreational Drug Use (Never); Tobacco (Never)         Review of Systems  · Constitutional  o Denies  o : fever, chills, weight loss  · Cardiovascular  o Denies  o : chest pain, shortness of breath  · Gastrointestinal  o Denies  o : liver disease, heartburn, nausea, blood in stools  · Genitourinary  o Denies  o : painful urination, blood in urine  · Integument  o Denies  o : rash, itching  · Neurologic  o Denies  o : headache, weakness, loss of consciousness  · Musculoskeletal  o Admits  o : painful, swollen joints  · Psychiatric  o Denies  o : drug/alcohol addiction, anxiety, depression      Vitals  Date Time BP Position Site L\R Cuff Size HR RR TEMP (F) WT  HT  BMI kg/m2 BSA m2 O2 Sat        2020 08:23 AM      81 - R   146lbs 2oz 5'  4\" 25.08 1.73 95 %          Physical Examination  · Constitutional  o Appearance  o : well developed, well-nourished, no obvious deformities present  · Head and Face  o Head  o :   § Inspection  § : normocephalic  o Face  o :   § Inspection  § : no facial lesions  · Eyes  o Conjunctivae  o : conjunctivae normal  o Sclerae  o : sclerae white  · Ears, Nose, Mouth and Throat  o Ears  o :   § External Ears  § : appearance within normal limits  § Hearing  § : intact  o Nose  o :   § External Nose  § : appearance normal  · Neck  o Inspection/Palpation  o : normal appearance  o Range of Motion  o : full range of motion  · Respiratory  o Respiratory Effort  o : breathing unlabored  o Inspection of Chest  o : normal appearance  o Auscultation of Lungs  o : no audible wheezing or " rales  · Cardiovascular  o Heart  o : regular rate  · Gastrointestinal  o Abdominal Examination  o : soft and non-tender  · Skin and Subcutaneous Tissue  o General Inspection  o : intact, no rashes  · Psychiatric  o General  o : Alert and oriented x3  o Judgement and Insight  o : judgment and insight intact  o Mood and Affect  o : mood normal, affect appropriate  · Right Shoulder  o Inspection  o : Well healed incisions. Forward flexion 160 degrees. Abduction 150 degrees. ER 30 degrees. IR to L5. Strength 4/5 with MMT rotator cuff. All compartments soft and well perfused. Sensation grossly intact.   · Right Hand  o Inspection  o : Mild swelling MCP, PIP and DIP joints in first three digits. Thenar and first dorsal webspace atrophy. Sensation and motor function grossly intact in all nerve distributions. Weak . Negative Tinel's. Negative Phalen's.   · In Office Procedures  o View  o : AP/LATERAL  o Site  o : right, hand  o Indication  o : Right hand pain  o Study  o : X-rays ordered, taken in the office, and reviewed today.  o Xray  o : Mild degenerative changes.  o Comparative Data  o : No comparative data found          Assessment  · Carpal Tunnel Syndrome     354.0/G56.00  · Arthralgia of right hand     719.44/M25.541  · Right shoulder pain, unspecified chronicity     719.41/M25.511  · History of repair of right rotator cuff     V15.29/Z98.890  · RSD (reflex sympathetic dystrophy)     337.20/G90.50      Plan  · Orders  o Hand (Right) [facility] ([id number]) - 719.44/M25.541 - 05/20/2020  · Medications  o Medications have been Reconciled  o Transition of Care or Provider Policy  · Instructions  o Reviewed the patient's Past Medical, Social, and Family history as well as the ROS at today's visit, no changes.  o Call or return if worsening symptoms.  o Referral to hand specialist for possible RSD right hand. Follow up in 2 months for right shoulder.            Electronically Signed by: Maria Del Rosario Mast PA-C  -Author on May 20, 2020 10:11:28 AM  Electronically Co-signed by: Phillip Cano MD -Reviewer on May 21, 2020 05:48:30 PM

## 2021-05-13 NOTE — PROGRESS NOTES
Progress Note      Patient Name: Amanda Cason   Patient ID: [id number]   Sex: Female   YOB: 1965    Primary Care Provider: Birdie CARDONA   Referring Provider: Phillip Cano MD    Visit Date: October 23, 2020    Provider: Carrington Shell MD   Location: [facility]   Location Address: [address] [place] [address]  [id number]   Location Phone: [phone]          Chief Complaint  · Follow up left shoulder pain.       History Of Present Illness  Amanda Cason is a 54 year old /White female who presents today for followup of her left shoulder. Her injury occurred on 09/10/2020. She is still having a lot of pain in her shoulder. She was previously given some hydrocodone and this has not helped her pain. She has pain all the time, especially when trying to move the arm and sleeping at night. She has went to one therapy session and is scheduled to go again next week. No new injury.       Past Medical History  Appendicitis; Diabetes; Diabetes mellitus type 2, noninsulin dependent; Diabetes mellitus, type 2; Diabetes Mellitus, Type II; Diabetic Eye Exam Last Completed; Diabetic Foot Exam Last Completed; Dry eye syndrome; Essential hypertension; GERD (gastroesophageal reflux disease); Glaucoma; Hyperlipidemia; Pap smear for cervical cancer screening; Pap Smear for Vaginal Cancer Screening; Reflux; Screening Mammogram; Uterine fibroid         Past Surgical History  Appendectomy; Colonoscopy; EAR Surgery; Tubal ligation         Medication List  diclofenac sodium 75 mg oral tablet,delayed release (DR/EC); krill oil 980-20-29-50 mg oral capsule; lisinopril 2.5 mg oral tablet; Lumigan 0.01 % ophthalmic drops; nabumetone 750 mg oral tablet; Norco 7.5-325 mg oral tablet; Percocet 7.5-325 mg oral tablet; rosuvastatin 10 mg oral tablet; Xigduo XR 10-1,000 mg oral tablet, IR - ER, biphasic 24hr         Allergy List  NO KNOWN DRUG ALLERGIES         Family Medical History  Heart Disease;  "Diabetes, unspecified type; Breast Neoplasm; Diabetes Mellitus, Type II; Family history of diabetes mellitus         Reproductive History   2 Para 2 0 0 0       Social History  Alcohol (Never); ; No known infection risk; Recreational Drug Use (Never); Tobacco (Never)         Review of Systems  · Constitutional  o Denies  o : fever, chills, weight loss  · Cardiovascular  o Denies  o : chest pain, shortness of breath  · Gastrointestinal  o Denies  o : liver disease, heartburn, nausea, blood in stools  · Genitourinary  o Denies  o : painful urination, blood in urine  · Integument  o Denies  o : rash, itching  · Neurologic  o Denies  o : headache, weakness, loss of consciousness  · Musculoskeletal  o Admits  o : painful, swollen joints  · Psychiatric  o Denies  o : drug/alcohol addiction, anxiety, depression      Vitals  Date Time BP Position Site L\R Cuff Size HR RR TEMP (F) WT  HT  BMI kg/m2 BSA m2 O2 Sat FR L/min FiO2        10/23/2020 09:18 AM      74 - R   135lbs 6oz 5'  6\" 21.85 1.69 98 %            Physical Examination  · Constitutional  o Appearance  o : well developed, well-nourished, no obvious deformities present  · Head and Face  o Head  o :   § Inspection  § : normocephalic  o Face  o :   § Inspection  § : no facial lesions  · Eyes  o Conjunctivae  o : conjunctivae normal  o Sclerae  o : sclerae white  · Ears, Nose, Mouth and Throat  o Ears  o :   § External Ears  § : appearance within normal limits  § Hearing  § : intact  o Nose  o :   § External Nose  § : appearance normal  · Neck  o Inspection/Palpation  o : normal appearance  o Range of Motion  o : full range of motion  · Respiratory  o Respiratory Effort  o : breathing unlabored  o Inspection of Chest  o : normal appearance  o Auscultation of Lungs  o : no audible wheezing or rales  · Cardiovascular  o Heart  o : regular rate  · Gastrointestinal  o Abdominal Examination  o : soft and non-tender  · Skin and Subcutaneous Tissue  o General " Inspection  o : intact, no rashes  · Psychiatric  o General  o : Alert and oriented x3  o Judgement and Insight  o : judgment and insight intact  o Mood and Affect  o : mood normal, affect appropriate  · Left Shoulder  o Inspection  o : Guarding around the shoulder. Tender to palpation globally at the shoulder. No significant swelling or bruising. Neurovascularly intact to the extremity. Decreased range of motion of the shoulder. Active elevation to only about 40. Abduction and elevation, passively, she can get just short of 90. Rotator cuff strength 4 -/ 5.   · In Office Procedures  o View  o : AP/LATERAL  o Site  o : left shoulder  o Indication  o : left shoulder pain  o Study  o : X-rays ordered, taken in the office, and reviewed today.  o Xray  o : Greater tuberosity fracture. The main fracture fragment is not significantly displaced; however, there is a calcific fragment adjacent to the tuberosity. Fracture is not completely healed on X-ray. No joint subluxation or dislocation.   o Comparative Data  o : Comparative Data found and reviewed today   · Imaging  o Imaging  o : X-rays of left shoulder at [facility] on 09/10/2020 revealed a minimally displaced fracture of the greater tuberosity of the proximal humerus. The fracture fragment measures approximately 1.5 x 0.6 cm. Glenohumeral joint is in normal alignment. AC joint in normal alignment without significant degenerative changes.           Assessment  · Left shoulder pain, unspecified chronicity     719.41/M25.512  · Fracture of greater tuberosity of humerus, left     812.03/S42.253A  possible rotator cuff injury      Plan  · Orders  o Scapula (Left) [facility] ([id number]) - 719.41/M25.512 - 10/23/2020  · Medications  o Medications have been Reconciled  o Transition of Care or Provider Policy  · Instructions  o Reviewed the patient's Past Medical, Social, and Family history as well as the ROS at today's visit, no changes.  o Call or return  if worsening symptoms.  o This note was transcribed by Angelica perera  o Plan for MRI of the left shoulder to evaluate for rotator cuff injury and fracture healing. Limited prescription for Percocet given today. Follow up after MRI to discuss results.             Electronically Signed by: Angelica Browne-, Other -Author on October 24, 2020 03:03:49 PM  Electronically Co-signed by: Carrington Shell MD -Reviewer on October 25, 2020 09:14:36 PM

## 2021-05-13 NOTE — PROGRESS NOTES
Progress Note      Patient Name: Amanda Cason   Patient ID: [id number]   Sex: Female   YOB: 1965    Primary Care Provider: Birdie CARDONA   Referring Provider: Phillip Cano MD    Visit Date: October 2, 2020    Provider: Genaro Victoria PA-C   Location: [facility]   Location Address: [address] [place] [address]  [id number]   Location Phone: [phone]          Chief Complaint  · Left shoulder pain      History Of Present Illness  Amanda Cason is a 54 year old /White female who presents today to [facility]. Patient presents for follow-up evaluation of left proximal humerus fracture, greater tuberosity fracture. Original injury was 9/10/2020. Patient states she still has pain in the shoulder, she has been using her sling, patient has been doing gentle range of motion of elbow and wrist. Patient is requesting pain medication refill.       Past Medical History  Appendicitis; Diabetes; Diabetes mellitus type 2, noninsulin dependent; Diabetes mellitus, type 2; Diabetes Mellitus, Type II; Diabetic Eye Exam Last Completed; Diabetic Foot Exam Last Completed; Dry eye syndrome; Essential hypertension; GERD (gastroesophageal reflux disease); Glaucoma; Hyperlipidemia; Pap smear for cervical cancer screening; Pap Smear for Vaginal Cancer Screening; Reflux; Screening Mammogram; Uterine fibroid         Past Surgical History  Appendectomy; Colonoscopy; EAR Surgery; Tubal ligation         Medication List  diclofenac sodium 75 mg oral tablet,delayed release (DR/EC); krill oil 327-88-17-50 mg oral capsule; lisinopril 2.5 mg oral tablet; Lumigan 0.01 % ophthalmic drops; nabumetone 750 mg oral tablet; Percocet 7.5-325 mg oral tablet; rosuvastatin 10 mg oral tablet; Xigduo XR 10-1,000 mg oral tablet, IR - ER, biphasic 24hr         Allergy List  NO KNOWN DRUG ALLERGIES       Allergies Reconciled  Family Medical History  Heart Disease; Diabetes, unspecified type; Breast  "Neoplasm; Diabetes Mellitus, Type II; Family history of diabetes mellitus         Reproductive History   2 Para 2 0 0 0       Social History  Alcohol (Never); ; No known infection risk; Recreational Drug Use (Never); Tobacco (Never)         Immunizations  Name Date Admin   Influenza Refused         Review of Systems  · Constitutional  o Denies  o : fever, chills, weight loss  · Cardiovascular  o Denies  o : chest pain, shortness of breath  · Gastrointestinal  o Denies  o : liver disease, heartburn, nausea, blood in stools  · Genitourinary  o Denies  o : painful urination, blood in urine  · Integument  o Denies  o : rash, itching  · Neurologic  o Denies  o : headache, weakness, loss of consciousness  · Musculoskeletal  o Denies  o : painful, swollen joints  · Psychiatric  o Denies  o : drug/alcohol addiction, anxiety, depression      Vitals  Date Time BP Position Site L\R Cuff Size HR RR TEMP (F) WT  HT  BMI kg/m2 BSA m2 O2 Sat FR L/min FiO2        10/02/2020 09:51 AM      75 - R   140lbs 0oz 5'  4\" 24.03 1.69 98 %            Physical Examination  · Constitutional  o Appearance  o : well developed, well-nourished, no obvious deformities present  · Head and Face  o Head  o :   § Inspection  § : normocephalic  o Face  o :   § Inspection  § : no facial lesions  · Eyes  o Conjunctivae  o : conjunctivae normal  o Sclerae  o : sclerae white  · Ears, Nose, Mouth and Throat  o Ears  o :   § External Ears  § : appearance within normal limits  § Hearing  § : intact  o Nose  o :   § External Nose  § : appearance normal  · Neck  o Inspection/Palpation  o : normal appearance  o Range of Motion  o : full range of motion  · Respiratory  o Respiratory Effort  o : breathing unlabored  o Inspection of Chest  o : normal appearance  o Auscultation of Lungs  o : no audible wheezing or rales  · Cardiovascular  o Heart  o : regular rate  · Gastrointestinal  o Abdominal Examination  o : soft and non-tender  · Skin and " Subcutaneous Tissue  o General Inspection  o : intact, no rashes  · Psychiatric  o General  o : Alert and oriented x3  o Judgement and Insight  o : judgment and insight intact  o Mood and Affect  o : mood normal, affect appropriate  · Left Shoulder  o Inspection  o : Tenderness to palpation of anterior lateral shoulder, range of motion appropriate, neurovascular intact, resolving ecchymosis to the bicep and shoulder. Elbow range of motion, wrist range of motion intact.  · In Office Procedures  o View  o : AP/LATERAL  o Site  o : left, scapula  o Indication  o : Left shoulder pain  o Study  o : X-rays ordered, taken in the office, and reviewed today.  o Xray  o : Healing greater tuberosity fracture, no increased displacement or angulation.  o Comparative Data  o : No comparative data found          Assessment  · Left shoulder pain, unspecified chronicity     719.41/M25.512  · Left proximal humerus greater tuberosity fracture     812.00/S42.209A      Plan  · Orders  o Scapula (Left) [facility] ([id number]) - 719.41/M25.512 - 10/02/2020  o Scapula (Left) [facility] ([id number]) - - 10/02/2020  · Medications  o Medications have been Reconciled  o Transition of Care or Provider Policy  · Instructions  o Reviewed the patient's Past Medical, Social, and Family history as well as the ROS at today's visit, no changes.  o Call or return if worsening symptoms.  o Follow Up in 3 weeks.  o Reviewed x-rays with the patient, advised patient to continue sling use with activity, otherwise work on gentle range of motion of the sling, physical therapy order was given. Norco prescription was given. Follow-up in 3 weeks with x-rays.            Electronically Signed by: RAY Cody-PING -Author on October 2, 2020 11:01:20 AM  Electronically Co-signed by: Carrington Shell MD -Reviewer on October 6, 2020 10:22:32 PM

## 2021-05-13 NOTE — PROGRESS NOTES
Progress Note      Patient Name: Amanda Cason   Patient ID: [id number]   Sex: Female   YOB: 1965    Primary Care Provider: Birdie CARDONA   Referring Provider: Phillip Cano MD    Visit Date: November 23, 2020    Provider: DULCE Cabrales   Location: [facility]   Location Address: [address] [place] [address], [address]  [place], [place]  [id number]   Location Phone: [phone]          Chief Complaint  · 6 mo f/u      History Of Present Illness  Video Conferencing Visit  Amanda Cason is a 55 year old /White female who is presenting for evaluation via video conferencing via [employer]. Verbal consent obtained before beginning visit.   The following staff were present during this visit: DULCE Cabrales and LAKEISHA Heard   Amanda Cason is a 55 year old /White female who presents for evaluation and treatment of:      Pt is a 6 mo f/u. Pt is having sx on her shoulder 12/14/2020 on her shoulder. Pt has been taking pain medication and states it increases her appetite. Pt is due labs but is unsure she wants to do them right now due to being on the pain medication. Pt states she checks her blood sugar regularly and stays within normal range.    Pt defers mammogram and colonoscopy until after the sx.       Past Medical History  Disease Name Date Onset Notes   Appendicitis --  --    Diabetes --  --    Diabetes mellitus type 2, noninsulin dependent --  --    Diabetes mellitus, type 2 02/12/2018 --    Diabetes Mellitus, Type II --  --    Diabetic Eye Exam Last Completed 6/23/2015 Saulo Siu    Diabetic Foot Exam Last Completed 11/20/2019 Done in office during visit   Dry eye syndrome --  --    Essential hypertension 02/12/2018 --    GERD (gastroesophageal reflux disease) 02/12/2018 --    Glaucoma --  --    Hyperlipidemia --  --    Pap smear for cervical cancer screening 8/30/2017 --    Pap Smear for Vaginal Cancer Screening 8/20/2017 --    Reflux --   --    Screening Mammogram 2019 declined   Uterine fibroid 08/10/2016 --          Past Surgical History  Procedure Name Date Notes   Appendectomy childhood --    Colonoscopy 2019 declined   EAR Surgery --  x 3 hole in ear   Tubal ligation '86 --          Medication List  Name Date Started Instructions   diclofenac sodium 75 mg oral tablet,delayed release (DR/EC) 2020 TAKE 1 TABLET BY MOUTH TWICE A DAY WITH FOOD   krill oil 138-06-94-50 mg oral capsule  take 1 capsule by oral route daily   lisinopril 2.5 mg oral tablet 10/29/2020 TAKE 1 TABLET BY MOUTH EVERY DAY   Lumigan 0.01 % ophthalmic drops  instill 1 drop in affected eye once a day (at bedtime)   Percocet 7.5-325 mg oral tablet 2020 take 1 tablet by oral route every 4 to 6 hours   rosuvastatin 10 mg oral tablet 2020 TAKE 1 TABLET BY MOUTH EVERYDAY AT BEDTIME   XIGDUO XR 10 MG-1,000 MG TAB 10/28/2020 TAKE 1 TABLET BY MOUTH IN THE MORNING WITH FOOD         Allergy List  Allergen Name Date Reaction Notes   NO KNOWN DRUG ALLERGIES --  --  --          Family Medical History  Disease Name Relative/Age Notes   Heart Disease Father/   Father   Diabetes, unspecified type Brother/  Mother/  Sister/   Mother; Sister; Brother   Breast Neoplasm Mother/   --    Diabetes Mellitus, Type II Mother/   --    Family history of diabetes mellitus Brother/  Mother/  Sister/   Mother; Sister; Brother         Reproductive History  Menstrual   Age Menarche: 12   Pregnancy Summary   Total Pregnancies: 2 Full Term: 2 Premature: 0   Ab Induced: 0 Ab Spontaneous: 0 Ectopics: 0   Multiples: 0 Livin         Social History  Finding Status Start/Stop Quantity Notes   Alcohol Never --/-- --  2020 - does not drink    --  --/-- --  --    No known infection risk --  --/-- --  --    Recreational Drug Use Never --/-- --  no   Tobacco Never --/-- --  2020 - never smoker  never smoker  never smoker  never         Immunizations  NameDate Admin [facility]  Trade Name Lot Number Route Inj VIS Given VIS Publication   InfluenzaRefused 11/20/2019 NE Not Entered  NE NE     Comments: declined         Review of Systems  · Constitutional  o Denies  o : fever, fatigue, weight loss, weight gain  · Cardiovascular  o Denies  o : lower extremity edema, claudication, chest pressure, palpitations  · Respiratory  o Denies  o : shortness of breath, wheezing, cough, hemoptysis, dyspnea on exertion  · Gastrointestinal  o Denies  o : nausea, vomiting, diarrhea, constipation, abdominal pain      Physical Examination  · Constitutional  o Appearance  o : well-nourished, well developed, alert, in no acute distress  · Neurologic  o Mental Status Examination  o :   § Orientation  § : grossly oriented to person, place and time  · Psychiatric  o Mood and Affect  o : mood normal, affect appropriate, denies any SI/HI          Assessment  · Diabetes mellitus, type 2     250.00/E11.9  · Essential hypertension     401.9/I10  · Hyperlipidemia     272.4/E78.5  · Thyroid disorder screen     V77.0/Z13.29      Plan  · Orders  o CBC with Auto Diff [facility] (02164) - - 11/23/2020  o CMP [facility] (58775) - - 11/23/2020  o Hgb A1c [facility] (37022) - - 11/23/2020  o Thyroid Profile (07586, 40001, THYII) - - 11/23/2020  o ACO-39: Current medications updated and reviewed (, 1159F) - - 11/23/2020  o ACO-41: Dilated Diabetic eye exam completed this year and results in chart/reviewed (2022F) - - 11/23/2020  o Urine microalbumin (56739) - - 11/23/2020  · Medications  o Medications have been Reconciled  o Transition of Care or Provider Policy  · Instructions  o Advised that cheeses and other sources of dairy fats, animal fats, fast food, and the extras (candy, pastries, pies, doughnuts and cookies) all contain LDL raising nutrients. Advised to increase fruits, vegetables, whole grains, and to monitor portion sizes.   o Patient was educated/instructed on their diagnosis, treatment and medications prior to discharge from the  clinic today.            Electronically Signed by: DULCE Cabrales -Author on November 23, 2020 11:15:19 AM

## 2021-05-13 NOTE — PROGRESS NOTES
Progress Note      Patient Name: Amanda Cason   Patient ID: [id number]   Sex: Female   YOB: 1965    Primary Care Provider: Birdie CARDONA   Referring Provider: Phillip Cano MD    Visit Date: July 15, 2020    Provider: Maria Del Rosario Mast PA-C   Location: [facility]   Location Address: [address] [place] [address]  [id number]   Location Phone: [phone]          Chief Complaint  · Follow up right shoulder pain      History Of Present Illness  Amanda Cason is a 54 year old /White female who presents today to [facility].      She is s/p right arthroscopic SAD/DC and mini open RCR 10/31/19 by Dr. Cano. Her shoulder is improving. She did see hand specialist for RSD left hand. She states minimal pain in shoulder and good ROM.       Past Medical History  Appendicitis; Diabetes; Diabetes mellitus type 2, noninsulin dependent; Diabetes mellitus, type 2; Diabetes Mellitus, Type II; Diabetic Eye Exam Last Completed; Diabetic Foot Exam Last Completed; Dry eye syndrome; Essential hypertension; GERD (gastroesophageal reflux disease); Glaucoma; Hyperlipidemia; Pap smear for cervical cancer screening; Pap Smear for Vaginal Cancer Screening; Reflux; Screening Mammogram; Uterine fibroid         Past Surgical History  Appendectomy; Colonoscopy; EAR Surgery; Tubal ligation         Medication List  diclofenac sodium 75 mg oral tablet,delayed release (DR/EC); krill oil 470-72-36-50 mg oral capsule; lisinopril 2.5 mg oral tablet; Lumigan 0.01 % ophthalmic drops; nabumetone 750 mg oral tablet; rosuvastatin 10 mg oral tablet; Xigduo XR 10-1,000 mg oral tablet, IR - ER, biphasic 24hr         Allergy List  NO KNOWN DRUG ALLERGIES       Allergies Reconciled  Family Medical History  Heart Disease; Diabetes, unspecified type; Breast Neoplasm; Diabetes Mellitus, Type II; Family history of diabetes mellitus         Reproductive History   2 Para 2 0 0 0       Social History  Alcohol  "(Never); ; No known infection risk; Recreational Drug Use (Never); Tobacco (Never)         Review of Systems  · Constitutional  o Denies  o : fever, chills, weight loss  · Cardiovascular  o Denies  o : chest pain, shortness of breath  · Gastrointestinal  o Denies  o : liver disease, heartburn, nausea, blood in stools  · Genitourinary  o Denies  o : painful urination, blood in urine  · Integument  o Denies  o : rash, itching  · Neurologic  o Denies  o : headache, weakness, loss of consciousness  · Musculoskeletal  o Admits  o : painful, swollen joints  · Psychiatric  o Denies  o : drug/alcohol addiction, anxiety, depression      Vitals  Date Time BP Position Site L\R Cuff Size HR RR TEMP (F) WT  HT  BMI kg/m2 BSA m2 O2 Sat        07/15/2020 07:54 AM      73 - R   142lbs 6oz 5'  4\" 24.44 1.71 98 %          Physical Examination  · Constitutional  o Appearance  o : well developed, well-nourished, no obvious deformities present  · Head and Face  o Head  o :   § Inspection  § : normocephalic  o Face  o :   § Inspection  § : no facial lesions  · Eyes  o Conjunctivae  o : conjunctivae normal  o Sclerae  o : sclerae white  · Ears, Nose, Mouth and Throat  o Ears  o :   § External Ears  § : appearance within normal limits  § Hearing  § : intact  o Nose  o :   § External Nose  § : appearance normal  · Neck  o Inspection/Palpation  o : normal appearance  o Range of Motion  o : full range of motion  · Respiratory  o Respiratory Effort  o : breathing unlabored  o Inspection of Chest  o : normal appearance  o Auscultation of Lungs  o : no audible wheezing or rales  · Cardiovascular  o Heart  o : regular rate  · Gastrointestinal  o Abdominal Examination  o : soft and non-tender  · Skin and Subcutaneous Tissue  o General Inspection  o : intact, no rashes  · Psychiatric  o General  o : Alert and oriented x3  o Judgement and Insight  o : judgment and insight intact  o Mood and Affect  o : mood normal, affect " appropriate  · Right Hand  o Inspection  o : Mild swelling MCP, PIP and DIP joints in first three digits. Thenar and first dorsal webspace atrophy. Sensation and motor function grossly intact in all nerve distributions. Weak . Negative Tinel's. Negative Phalen's.           Assessment  · Pain: Shoulder     719.41/M25.519  · History of repair of right rotator cuff     V15.29/Z98.890      Plan  · Instructions  o Reviewed the patient's Past Medical, Social, and Family history as well as the ROS at today's visit, no changes.  o Call or return if worsening symptoms.  o Advised to continue HEP for shoulder ROM. Follow Up PRN.            Electronically Signed by: RAY Martin-PING -Author on July 15, 2020 09:08:23 AM  Electronically Co-signed by: Phillip Cano MD -Reviewer on July 15, 2020 04:47:09 PM

## 2021-05-13 NOTE — PROGRESS NOTES
Progress Note      Patient Name: Amanda Cason   Patient ID: [id number]   Sex: Female   YOB: 1965    Primary Care Provider: Birdie CARDONA   Referring Provider: Phillip Cano MD    Visit Date: May 20, 2020    Provider: DULCE Cabrales   Location: [facility]   Location Address: [address] [place] [address], [address]  [place] Vas[place]  [id number]   Location Phone: [phone]          Chief Complaint  · 6 month follow up      History Of Present Illness  Amanda Cason is a 54 year old /White female who presents for evaluation and treatment of:      Pt is here for her 6 month follow up. She is doing okay, Sherice Mast is referring her to a hand specialist for her right hand, she has really bad arthritis and carpal tunnel.    She declines the Mammogram, Pap Smear, and Colonoscopy for now. Reports that she does not have the money to get these done at the moment.    She is due for labs and can do today, she is fasting.    Pt had her DM eye exam last year at [facility]. Will request record.    She reports no other issues or problems at this time.         Past Medical History  Disease Name Date Onset Notes   Appendicitis --  --    Diabetes --  --    Diabetes mellitus type 2, noninsulin dependent --  --    Diabetes mellitus, type 2 02/12/2018 --    Diabetes mellitus, type II --  --    Diabetic Eye Exam Last Completed 6/23/2015 [facility]    Diabetic Foot Exam Last Completed 11/20/2019 Done in office during visit   Dry eye syndrome --  --    Essential hypertension 02/12/2018 --    GERD (gastroesophageal reflux disease) 02/12/2018 --    Glaucoma --  --    Hyperlipidemia --  --    Pap smear for cervical cancer screening 8/30/2017 --    Pap Smear for Vaginal Cancer Screening 8/20/2017 --    Reflux --  --    Screening Mammogram 11/20/2019 declined   Uterine fibroid 08/10/2016 --          Past Surgical History  Procedure Name Date Notes   Appendectomy childhood --    Colonoscopy 11/20/2019  declined   EAR Surgery --  x 3 hole in ear   Tubal ligation '86 --          Medication List  Name Date Started Instructions   diclofenac sodium 75 mg oral tablet,delayed release (DR/EC) 2020 TAKE 1 TABLET BY MOUTH TWICE A DAY WITH FOOD   krill oil 435-14-86-50 mg oral capsule  take 1 capsule by oral route daily   lisinopril 2.5 mg oral tablet 2020 TAKE 1 TABLET BY MOUTH EVERY DAY   Lumigan 0.01 % ophthalmic drops  instill 1 drop in affected eye once a day (at bedtime)   nabumetone 750 mg oral tablet 2020 take 1 tablet by oral route 2 times a day   rosuvastatin 10 mg oral tablet 02/10/2020 TAKE 1 TABLET BY MOUTH EVERYDAY AT BEDTIME   Xigduo XR 10-1,000 mg oral tablet, IR - ER, biphasic 24hr 2020 TAKE 1 TABLET BY MOUTH IN THE MORNING WITH FOOD         Allergy List  Allergen Name Date Reaction Notes   NO KNOWN DRUG ALLERGIES --  --  --          Family Medical History  Disease Name Relative/Age Notes   Heart Disease Father/   Father   Diabetes, unspecified type Brother/  Mother/  Sister/   Mother; Sister; Brother   Breast Neoplasm Mother/   --    Diabetes mellitus, type II Mother/   --    Family history of diabetes mellitus Brother/  Mother/  Sister/   Mother; Sister; Brother         Reproductive History  Menstrual   Age Menarche: 12   Pregnancy Summary   Total Pregnancies: 2 Full Term: 2 Premature: 0   Ab Induced: 0 Ab Spontaneous: 0 Ectopics: 0   Multiples: 0 Livin         Social History  Finding Status Start/Stop Quantity Notes   Alcohol Never --/-- --  2020 - does not drink    --  --/-- --  --    No known infection risk --  --/-- --  --    Recreational Drug Use Never --/-- --  no   Tobacco Never --/-- --  2020 - never smoker  never smoker  never smoker  never         Immunizations  NameDate Admin Mfg Trade Name Lot Number Route Inj VIS Given VIS Publication   InfluenzaRefused 2019 NE Not Entered  NE NE     Comments: declined         Review of  "Systems  · Constitutional  o Denies  o : fever, fatigue, weight loss, weight gain  · Cardiovascular  o Denies  o : lower extremity edema, claudication, chest pressure, palpitations  · Respiratory  o Denies  o : shortness of breath, wheezing, cough, hemoptysis, dyspnea on exertion  · Gastrointestinal  o Denies  o : nausea, vomiting, diarrhea, constipation, abdominal pain      Vitals  Date Time BP Position Site L\R Cuff Size HR RR TEMP (F) WT  HT  BMI kg/m2 BSA m2 O2 Sat HC       05/20/2020 08:23 AM      81 - R   146lbs 2oz 5'  4\" 25.08 1.73 95 %    05/20/2020 10:53 /64 Sitting    65 - R   144lbs 2oz 5'  4\" 24.74 1.72 100 %          Physical Examination  · Constitutional  o Appearance  o : well-nourished, well developed, alert, in no acute distress  · Ears, Nose, Mouth and Throat  o Ears  o :   § External Ears  § : appearance within normal limits, no lesions present  § Otoscopic Examination  § : tympanic membrane appearance within normal limits bilaterally without perforations, mobility normal  o Nose  o :   § External Nose  § : normal stucture noted.  § Intranasal Exam  § : no swelling, reddness, turbs normal grey.  o Oral Cavity  o :   § Oral Mucosa  § : oral mucosa normal without pallor or cyanosis  § Lips  § : lip appearance normal  § Teeth  § : normal dentition for age  § Gums  § : gums pink, non-swollen, no bleeding present  § Tongue  § : tongue appearance normal  § Palate  § : hard palate normal, soft palate appearance normal  o Throat  o :   § Oropharynx  § : no inflammation or lesions present, tonsils within normal limits  · Respiratory  o Respiratory Effort  o : breathing unlabored  o Auscultation of Lungs  o : normal breath sounds throughout  · Cardiovascular  o Heart  o :   § Auscultation of Heart  § : regular rate and rhythm, no murmurs, gallops or rubs  § Palpation of Heart  § : normal apical impulse, no cardiac thrill present  o Peripheral Vascular System  o :   § Carotid Arteries  § : normal " pulses bilaterally, no bruits present  § Pedal Pulses  § : pulses 2 bilaterally  § Extremities  § : no cyanosis, clubbing or edema; less than 2 second refill noted  · Gastrointestinal  o Abdominal Examination  o : abdomen nontender to palpation, tone normal without rigidity or guarding, no masses present, abdominal contour scaphoid  o Liver and spleen  o : no hepatomegaly present, liver nontender to palpation, spleen not palpable  · Musculoskeletal  o Right Upper Extremity  o :   § Inspection/Palpation  § : no tenderness to palpation  § Range of Motion  § : range of motion normal, no joint crepitus or pain with motion present  o Left Upper Extremity  o :   § Inspection/Palpation  § : no tenderness to palpation  § Range of Motion  § : range of motion normal, no joint crepitus present, no pain with joint motion  o Right Lower Extremity  o :   § Inspection/Palpation  § : no joint or limb tenderness to palpation  § Range of Motion  § : range of motion normal, no joint crepitations present, no pain on motion  o Left Lower Extremity  o :   § Inspection/Palpation  § : no joint or limb tenderness to palpation  § Range of Motion  § : range of motion normal, no joint crepitations present, no pain on motion  · Skin and Subcutaneous Tissue  o General Inspection  o : no rashes or lesions present, no areas of discoloration  · Neurologic  o Mental Status Examination  o :   § Orientation  § : grossly oriented to person, place and time  o Cranial Nerves  o : cranial nerves intact and symmetric throughout  · Psychiatric  o Mood and Affect  o : mood normal, affect appropriate, denies any SI/HI          Assessment  · Diabetes mellitus, type 2     250.00/E11.9  · Essential hypertension     401.9/I10  · GERD (gastroesophageal reflux disease)     530.81/K21.9  · Hyperlipidemia     272.4/E78.5  · Screening for depression     V79.0/Z13.89      Plan  · Orders  o Urine microalbumin (06073) - 250.00/E11.9 - 05/20/2020  o Diabetic Dilated Eye  Exam Consult with Ophthalmology/Optometry (DMEYE) - 250.00/E11.9 - 05/20/2020  o CBC with Auto Diff [facility] ([id number]) - 250.00/E11.9, 401.9/I10, 530.81/K21.9, V79.0/Z13.89 - 05/20/2020  o CMP [facility] ([id number]) - 250.00/E11.9, 401.9/I10, 530.81/K21.9, V79.0/Z13.89 - 05/20/2020  o Hgb A1c [facility] ([id number]) - 250.00/E11.9, 401.9/I10, 530.81/K21.9, V79.0/Z13.89 - 05/20/2020  o Lipid Panel [facility] ([id number]) - 250.00/E11.9, 401.9/I10, 530.81/K21.9, V79.0/Z13.89 - 05/20/2020  o Thyroid Profile (35569, 34814, THYII) - 250.00/E11.9, 401.9/I10, 530.81/K21.9, V79.0/Z13.89 - 05/20/2020  o ACO-39: Current medications updated and reviewed () - - 05/20/2020  o ACO-18: Negative screen for clinical depression using a standardized tool () - - 05/20/2020  o ACO-14: Influenza immunization was not administered for reasons documented () - - 05/20/2020  o Diabetic Dilated Eye Exam Consult with Ophthalmology/Optometry (DMEYE) - - 05/20/2020  · Instructions  o Advised that cheeses and other sources of dairy fats, animal fats, fast food, and the extras (candy, pastries, pies, doughnuts and cookies) all contain LDL raising nutrients. Advised to increase fruits, vegetables, whole grains, and to monitor portion sizes.   o Depression Screen completed and scanned into the EMR under the designated folder within the patient's documents.  o Today's PHQ-9 result is 3  o Patient was educated/instructed on their diagnosis, treatment and medications prior to discharge from the clinic today.  o Risks, benefits, and alternatives were discussed with the patient. The patient is aware of risks associated with: declining mammo, pap and colonosocpy            Electronically Signed by: DULCE Cabrales -Author on May 20, 2020 11:02:58 AM

## 2021-05-14 VITALS — WEIGHT: 140 LBS | HEIGHT: 64 IN | BODY MASS INDEX: 23.9 KG/M2

## 2021-05-14 VITALS — HEIGHT: 64 IN | OXYGEN SATURATION: 98 % | WEIGHT: 142.25 LBS | HEART RATE: 82 BPM | BODY MASS INDEX: 24.28 KG/M2

## 2021-05-14 VITALS — OXYGEN SATURATION: 97 % | HEART RATE: 76 BPM | BODY MASS INDEX: 25.5 KG/M2 | HEIGHT: 64 IN | WEIGHT: 149.37 LBS

## 2021-05-14 VITALS — HEART RATE: 94 BPM | HEIGHT: 64 IN | OXYGEN SATURATION: 96 % | WEIGHT: 148 LBS | BODY MASS INDEX: 25.27 KG/M2

## 2021-05-14 VITALS — WEIGHT: 133.25 LBS | BODY MASS INDEX: 22.75 KG/M2 | HEIGHT: 64 IN

## 2021-05-14 VITALS — BODY MASS INDEX: 26.38 KG/M2 | OXYGEN SATURATION: 98 % | HEART RATE: 71 BPM | WEIGHT: 154.5 LBS | HEIGHT: 64 IN

## 2021-05-14 VITALS — WEIGHT: 140 LBS | BODY MASS INDEX: 23.9 KG/M2 | HEIGHT: 64 IN

## 2021-05-14 VITALS — HEART RATE: 74 BPM | BODY MASS INDEX: 21.75 KG/M2 | WEIGHT: 135.37 LBS | OXYGEN SATURATION: 98 % | HEIGHT: 66 IN

## 2021-05-14 VITALS — WEIGHT: 140 LBS | OXYGEN SATURATION: 98 % | HEART RATE: 75 BPM | BODY MASS INDEX: 23.9 KG/M2 | HEIGHT: 64 IN

## 2021-05-14 NOTE — PROGRESS NOTES
Progress Note      Patient Name: Amanda aCson   Patient ID: [id number]   Sex: Female   YOB: 1965    Primary Care Provider: Birdie CARDONA   Referring Provider: Phillip Cano MD    Visit Date: April 23, 2021    Provider: Genaro Victoria PA-C   Location: [facility]   Location Address: [address] [place] [address]  [id number]   Location Phone: [phone]          Chief Complaint  · Left shoulder pain  · Left wrist pain  · Left hand pain      History Of Present Illness  Amanda Cason is a 55 year old /White female who presents today to [facility]. The open rotator cuff repair, 12/14/2020 and to review the MRIs that were performed on her wrist hand shoulder and neck. Patient states she still has limited use of her left hand, she admits to pain and stiffness and cannot make a full fist or move her fingers due to her pain and stiffness. Patient denies numbness and tingling of her left upper extremity. Patient has stopped physical therapy since before her last visit due to getting these MRIs and results.       Past Medical History  Appendicitis; Diabetes; Diabetes mellitus type 2, noninsulin dependent; Diabetes mellitus, type 2; Diabetes Mellitus, Type II; Diabetic Eye Exam Last Completed; Diabetic Foot Exam Last Completed; Dry eye syndrome; Essential hypertension; GERD (gastroesophageal reflux disease); Glaucoma; Hyperlipidemia; Pap smear for cervical cancer screening; Pap Smear for Vaginal Cancer Screening; Reflux; Screening Mammogram; Uterine fibroid         Past Surgical History  Appendectomy; Colonoscopy; EAR Surgery; Tubal ligation         Medication List  diclofenac sodium 75 mg oral tablet,delayed release (DR/EC); krill oil 130-20-51-50 mg oral capsule; lisinopril 2.5 mg oral tablet; Xigduo XR 10-1,000 mg oral tablet, IR - ER, biphasic 24hr; XIGDUO XR 10 MG-1,000 MG TAB         Allergy List  NO KNOWN DRUG ALLERGIES       Allergies Reconciled  Family  "Medical History  Heart Disease; Diabetes, unspecified type; Breast Neoplasm; Diabetes Mellitus, Type II; Family history of diabetes mellitus         Reproductive History   2 Para 2 0 0 0       Social History  Alcohol (Never); ; No known infection risk; Recreational Drug Use (Never); Tobacco (Never)         Immunizations  Name Date Admin   Influenza Refused         Review of Systems  · Constitutional  o Denies  o : fever, chills, weight loss  · Cardiovascular  o Denies  o : chest pain, shortness of breath  · Gastrointestinal  o Denies  o : liver disease, heartburn, nausea, blood in stools  · Genitourinary  o Denies  o : painful urination, blood in urine  · Integument  o Denies  o : rash, itching  · Neurologic  o Denies  o : headache, weakness, loss of consciousness  · Musculoskeletal  o Denies  o : painful, swollen joints  · Psychiatric  o Denies  o : drug/alcohol addiction, anxiety, depression      Vitals  Date Time BP Position Site L\R Cuff Size HR RR TEMP (F) WT  HT  BMI kg/m2 BSA m2 O2 Sat FR L/min FiO2        2021 10:07 AM      71 - R   154lbs 8oz 5'  4\" 26.52 1.78 98 %            Physical Examination  · Constitutional  o Appearance  o : well developed, well-nourished, no obvious deformities present  · Head and Face  o Head  o :   § Inspection  § : normocephalic  o Face  o :   § Inspection  § : no facial lesions  · Eyes  o Conjunctivae  o : conjunctivae normal  o Sclerae  o : sclerae white  · Ears, Nose, Mouth and Throat  o Ears  o :   § External Ears  § : appearance within normal limits  § Hearing  § : intact  o Nose  o :   § External Nose  § : appearance normal  · Neck  o Inspection/Palpation  o : normal appearance  o Range of Motion  o : full range of motion  · Respiratory  o Respiratory Effort  o : breathing unlabored  o Inspection of Chest  o : normal appearance  o Auscultation of Lungs  o : no audible wheezing or rales  · Cardiovascular  o Heart  o : regular " rate  · Gastrointestinal  o Abdominal Examination  o : soft and non-tender  · Skin and Subcutaneous Tissue  o General Inspection  o : intact, no rashes  · Psychiatric  o General  o : Alert and oriented x3  o Judgement and Insight  o : judgment and insight intact  o Mood and Affect  o : mood normal, affect appropriate  · Cervical Spine  o Inspection  o : No midline tenderness, full range of motion with flexion, extension, rotation and lateral movements. Mild paraspinal tenderness.   · Left Shoulder  o Inspection  o : Incisions are well-healed, no erythema, no ecchymosis, no swelling, no signs of infection, active forward elevation 90, active abduction 90, passive forward elevation 150, passive abduction 120, pain with range of motion.   · Left Hand  o Inspection  o : Left hand and wrist: Mild swelling to the fingers and thumb. Tenderness to palpation of fingers and thumb. Limited flexion and extension of fingers and thumb. Neurovascularly intact, patient unable to make a full fist due to thumb and finger limited range of motion. 2+ capillary refill, 2+ radial and ulnar pulses. Pain with range of motion of the thumb and fingers, negative grind test, negative snuffbox tenderness, wrist range of motion intact, mild pain with wrist range of motion.   · Imaging  o Imaging  o : MRI left hand without contrast, 4/19/2021, conclusion: No acute osseous abnormality. Mild to moderate osteoarthritic changes are present. No evidence of an inflammatory arthropathy. No evidence of internal derangement. No findings identified to suggest an inflammatory arthropathy. MRI left wrist without contrast, 4/19/2021, conclusion: 1. No acute osseous abnormality. 2. Mild to moderate osteoarthritic changes are present. No suspicious or erosions identified to suggest an inflammatory arthropathy. 3. Mild tenosynovitis of the first through fourth extensor compartments. 4. Irregularity of the ulnar styloid which may be related to previous trauma.  There are probable small degenerative tears of the foveal attachment in the body of the TFCC. 5. Mild tendinosis and tenosynovitis of the extensor carpi ulnaris tendon. MRI cervical spine without contrast, 4/19/2021, conclusion: Multilevel degenerative disc disease change. There is no focal disc protrusion or free disc fragment. MRI left shoulder without contrast, 4/19/2021, conclusion: 1. Postsurgical changes are seen related to previous rotator cuff repair. Heterogeneity is present within the rotator cuff, most pronounced within the supraspinatus tendon likely related to postsurgical change and probable healing of previous tears. No definite focal tear identified. Moderate glenohumeral osteoarthritis. 2. Mild acromioclavicular osteoarthritis. 3. Mild pan labral degenerative tearing with no evidence of a focal displaced tear.          Assessment  · Aftercare following surgery of left shoulder arthroscopic subacromial decompression, mini open rotator cuff repair, 12/14/2020     V54.81  · Arthralgia of left hand     719.44/M25.542  · Left shoulder pain, unspecified chronicity     719.41/M25.512  · Left wrist pain     719.43/M25.532      Plan  · Medications  o Medications have been Reconciled  o Transition of Care or Provider Policy  · Instructions  o MRIs reviewed by Dr. Shell.  o Reviewed the patient's Past Medical, Social, and Family history as well as the ROS at today's visit, no changes.  o Call or return if worsening symptoms.  o Follow Up after EMG.  o Reviewed MRIs with the patient, discussed that we recommend restarting physical therapy, new order was written, patient was advised we will order nerve conduction study for her left upper extremity for further evaluation, follow-up after nerve conduction study.            Electronically Signed by: RAY Cody-PING -Author on April 23, 2021 10:46:23 AM  Electronically Co-signed by: Carrington Shell MD -Reviewer on April 24, 2021 08:51:22 PM

## 2021-05-14 NOTE — PROGRESS NOTES
"   Progress Note      Patient Name: Amanda Cason   Patient ID: [id number]   Sex: Female   YOB: 1965    Primary Care Provider: Birdie CARDONA   Referring Provider: Phillip Cano MD    Visit Date: March 12, 2021    Provider: Genaro Victoria PA-C   Location: [facility]   Location Address: [address] [place] [address]  [id number]   Location Phone: [phone]          Chief Complaint  · Follow up left shoulder pain      History Of Present Illness  Amanda Cason is a 55 year old /White female who presents today to [facility]. Patient presents for follow-up evaluation of left shoulder arthroscopic subacromial decompression, mini open rotator cuff repair, 12/14/2020. Patient states she found a physical therapy place closer to her home, she is attending 2 times per week, she states it is  than the last place. Patient states that she stopped using her sling during the day, she states that she has been wearing it at night because her therapist told her to wear it at night. Patient states she still has pain in the shoulder, states she still has trouble moving the shoulder and she is now complaining of left thumb and wrist pain. Patient denies numbness and tingling, states she has difficulty moving her thumb and second finger. Patient denies injury, she states that the thumb and finger started giving her trouble about 3 weeks ago. Patient states she has talked to her therapist about this and they say that it is \"all in her head \".       Past Medical History  Appendicitis; Diabetes; Diabetes mellitus type 2, noninsulin dependent; Diabetes mellitus, type 2; Diabetes Mellitus, Type II; Diabetic Eye Exam Last Completed; Diabetic Foot Exam Last Completed; Dry eye syndrome; Essential hypertension; GERD (gastroesophageal reflux disease); Glaucoma; Hyperlipidemia; Pap smear for cervical cancer screening; Pap Smear for Vaginal Cancer Screening; Reflux; Screening " "Mammogram; Uterine fibroid         Past Surgical History  Appendectomy; Colonoscopy; EAR Surgery; Tubal ligation         Medication List  diclofenac sodium 75 mg oral tablet,delayed release (DR/EC); gabapentin 300 mg oral capsule; krill oil 068-48-49-50 mg oral capsule; lisinopril 2.5 mg oral tablet; Lumigan 0.01 % ophthalmic drops; Norco 7.5-325 mg oral tablet; Percocet 7.5-325 mg oral tablet; rosuvastatin 10 mg oral tablet; ROSUVASTATIN CALCIUM 10 MG TAB         Allergy List  NO KNOWN DRUG ALLERGIES       Allergies Reconciled  Family Medical History  Heart Disease; Diabetes, unspecified type; Breast Neoplasm; Diabetes Mellitus, Type II; Family history of diabetes mellitus         Reproductive History   2 Para 2 0 0 0       Social History  Alcohol (Never); ; No known infection risk; Recreational Drug Use (Never); Tobacco (Never)         Immunizations  Name Date Admin   Influenza Refused         Review of Systems  · Constitutional  o Denies  o : fever, chills, weight loss  · Cardiovascular  o Denies  o : chest pain, shortness of breath  · Gastrointestinal  o Denies  o : liver disease, heartburn, nausea, blood in stools  · Genitourinary  o Denies  o : painful urination, blood in urine  · Integument  o Denies  o : rash, itching  · Neurologic  o Denies  o : headache, weakness, loss of consciousness  · Musculoskeletal  o Denies  o : painful, swollen joints  · Psychiatric  o Denies  o : drug/alcohol addiction, anxiety, depression      Vitals  Date Time BP Position Site L\R Cuff Size HR RR TEMP (F) WT  HT  BMI kg/m2 BSA m2 O2 Sat FR L/min FiO2 HC       2021 08:05 AM      94 - R   147lbs 16oz 5'  4\" 25.4 1.74 96 %            Physical Examination  · Constitutional  o Appearance  o : well developed, well-nourished, no obvious deformities present  · Head and Face  o Head  o :   § Inspection  § : normocephalic  o Face  o :   § Inspection  § : no facial lesions  · Eyes  o Conjunctivae  o : conjunctivae " normal  o Sclerae  o : sclerae white  · Ears, Nose, Mouth and Throat  o Ears  o :   § External Ears  § : appearance within normal limits  § Hearing  § : intact  o Nose  o :   § External Nose  § : appearance normal  · Neck  o Inspection/Palpation  o : normal appearance  o Range of Motion  o : full range of motion  · Respiratory  o Respiratory Effort  o : breathing unlabored  o Inspection of Chest  o : normal appearance  o Auscultation of Lungs  o : no audible wheezing or rales  · Cardiovascular  o Heart  o : regular rate  · Gastrointestinal  o Abdominal Examination  o : soft and non-tender  · Skin and Subcutaneous Tissue  o General Inspection  o : intact, no rashes  · Psychiatric  o General  o : Alert and oriented x3  o Judgement and Insight  o : judgment and insight intact  o Mood and Affect  o : mood normal, affect appropriate  · Left Shoulder  o Inspection  o : Incisions are well-healed, no erythema, no ecchymosis, no swelling, no signs of infection, active forward elevation 90, active abduction 90, passive forward elevation 150, passive abduction 120, pain with range of motion.  · Extremities  o Extremities  o : Left hand and wrist: Limited flexion of the PIP joint of the thumb, limited flexion of the second finger, full range of motion of third fourth fifth fingers, neurovascularly intact, patient unable to make a full fist due to thumb and index finger limited range of motion. 2+ capillary refill, 2+ radial and ulnar pulses. Pain with range of motion of the thumb, negative grind test, negative snuffbox tenderness, wrist range of motion intact, mild pain with wrist range of motion.  · In Office Procedures  o View  o : AP/LATERAL  o Site  o : left, hand   o Indication  o : Left arm pain   o Study  o : X-rays ordered, taken in the office, and reviewed today.  o Xray  o : Mild degenerative changes, no fracture, no dislocation  o Comparative Data  o : No comparative data found          Assessment  · Aftercare  following surgery of left shoulder arthroscopic subacromial decompression, mini open rotator cuff repair, 12/14/2020     V54.81  · Pain: Hand     719.44/M79.643  · Pain: Shoulder     719.41/M25.519      Plan  · Orders  o Hand (Left) [facility] ([id number]) - 719.44/M79.643 - 03/12/2021  · Instructions  o Dr. Shell saw and examined the patient and agrees with plan.   o X-rays reviewed by Dr. Shell.  o Reviewed the patient's Past Medical, Social, and Family history as well as the ROS at today's visit, no changes.  o Call or return if worsening symptoms.  o Follow up in 6 weeks.  o Reviewed x-rays with the patient Dr. Shell also reviewed the x-rays and met with the patient, we discussed that patient should begin anti-inflammatory medication she was given diclofenac prescription, she was given a short course of pain medication refill. We discussed that she should talk to her therapist about getting very aggressive with range of motion and strength for therapy. Follow-up in 6 weeks for reevaluation, patient agrees.            Electronically Signed by: Genaro Victoria PA-C -Author on April 5, 2021 07:55:19 AM

## 2021-05-14 NOTE — PROGRESS NOTES
Progress Note      Patient Name: Amanda Cason   Patient ID: [id number]   Sex: Female   YOB: 1965    Primary Care Provider: Birdie CARDONA   Referring Provider: Phillip Cano MD    Visit Date: January 29, 2021    Provider: Genaro Victoria PA-C   Location: [facility]   Location Address: [address] [place] [address]  [id number]   Location Phone: [phone]          Chief Complaint  · Left shoulder pain      History Of Present Illness  Amanda Cason is a 55 year old /White female who presents today to [facility]. Patient presents for follow-up evaluation of left shoulder arthroscopic subacromial decompression, mini open rotator cuff repair, 12/14/2020. Patient states she has continued to use her sling, she states she has been weaning herself off it slowly. Patient states she has need for more pain medication, she takes them mainly at night and states that pain in the shoulder persists. Patient has been attending physical therapy 1 time per week, doing home exercises. Patient lives in [place], she has been coming to physical therapy in a town which is a 30 to 40-minute drive, she states it is expensive to come to therapy and she states it is a struggle. Patient also states she is not happy with physical therapy facility, she states it is not clean enough and she would like to choose a different place to go to therapy.       Past Medical History  Appendicitis; Diabetes; Diabetes mellitus type 2, noninsulin dependent; Diabetes mellitus, type 2; Diabetes Mellitus, Type II; Diabetic Eye Exam Last Completed; Diabetic Foot Exam Last Completed; Dry eye syndrome; Essential hypertension; GERD (gastroesophageal reflux disease); Glaucoma; Hyperlipidemia; Pap smear for cervical cancer screening; Pap Smear for Vaginal Cancer Screening; Reflux; Screening Mammogram; Uterine fibroid         Past Surgical History  Appendectomy; Colonoscopy; EAR Surgery; Tubal  "ligation         Medication List  diclofenac sodium 75 mg oral tablet,delayed release (DR/EC); gabapentin 300 mg oral capsule; krill oil 536-02-22-50 mg oral capsule; lisinopril 2.5 mg oral tablet; Lumigan 0.01 % ophthalmic drops; Percocet 7.5-325 mg oral tablet; rosuvastatin 10 mg oral tablet; ROSUVASTATIN CALCIUM 10 MG TAB         Allergy List  NO KNOWN DRUG ALLERGIES       Allergies Reconciled  Family Medical History  Heart Disease; Diabetes, unspecified type; Breast Neoplasm; Diabetes Mellitus, Type II; Family history of diabetes mellitus         Reproductive History   2 Para 2 0 0 0       Social History  Alcohol (Never); ; No known infection risk; Recreational Drug Use (Never); Tobacco (Never)         Immunizations  Name Date Admin   Influenza Refused         Review of Systems  · Constitutional  o Denies  o : fever, chills, weight loss  · Cardiovascular  o Denies  o : chest pain, shortness of breath  · Gastrointestinal  o Denies  o : liver disease, heartburn, nausea, blood in stools  · Genitourinary  o Denies  o : painful urination, blood in urine  · Integument  o Denies  o : rash, itching  · Neurologic  o Denies  o : headache, weakness, loss of consciousness  · Musculoskeletal  o Denies  o : painful, swollen joints  · Psychiatric  o Denies  o : drug/alcohol addiction, anxiety, depression      Vitals  Date Time BP Position Site L\R Cuff Size HR RR TEMP (F) WT  HT  BMI kg/m2 BSA m2 O2 Sat FR L/min FiO2        2021 08:08 AM      82 - R   142lbs 4oz 5'  4\" 24.42 1.71 98 %            Physical Examination  · Constitutional  o Appearance  o : well developed, well-nourished, no obvious deformities present  · Head and Face  o Head  o :   § Inspection  § : normocephalic  o Face  o :   § Inspection  § : no facial lesions  · Eyes  o Conjunctivae  o : conjunctivae normal  o Sclerae  o : sclerae white  · Ears, Nose, Mouth and Throat  o Ears  o :   § External Ears  § : appearance within normal " limits  § Hearing  § : intact  o Nose  o :   § External Nose  § : appearance normal  · Neck  o Inspection/Palpation  o : normal appearance  o Range of Motion  o : full range of motion  · Respiratory  o Respiratory Effort  o : breathing unlabored  o Inspection of Chest  o : normal appearance  o Auscultation of Lungs  o : no audible wheezing or rales  · Cardiovascular  o Heart  o : regular rate  · Gastrointestinal  o Abdominal Examination  o : soft and non-tender  · Skin and Subcutaneous Tissue  o General Inspection  o : intact, no rashes  · Psychiatric  o General  o : Alert and oriented x3  o Judgement and Insight  o : judgment and insight intact  o Mood and Affect  o : mood normal, affect appropriate  · Left Shoulder  o Inspection  o : Incisions are well-healed, no erythema, no ecchymosis, swelling, no effusion, no signs of infection. Mild tenderness to palpation at incision sites, passive forward elevation 150, passive abduction 110, external rotation at side 30,          Assessment  · Aftercare following surgery of left shoulder arthroscopic subacromial decompression, mini open rotator cuff repair, 12/14/2020     V54.81  · Left shoulder pain, unspecified chronicity     719.41/M25.512      Plan  · Medications  o Medications have been Reconciled  o Transition of Care or Provider Policy  · Instructions  o Reviewed the patient's Past Medical, Social, and Family history as well as the ROS at today's visit, no changes.  o Call or return if worsening symptoms.  o Follow up in 6 weeks.  o Advised patient she may discontinue/wean out of the sling. Patient was advised that we would recommend her attending physical therapy closer to home and we provided her with 2 locations in [place] that she could call to begin therapy there, she was given a new order for physical therapy. Patient was given refill of pain medication, continue therapy, follow-up in 6 weeks.            Electronically Signed by: Genaro Victoria PA-C  -Author on January 29, 2021 09:09:50 AM  Electronically Co-signed by: Carrington Shell MD -Reviewer on January 29, 2021 01:36:05 PM

## 2021-05-14 NOTE — PROGRESS NOTES
Progress Note      Patient Name: Amanda Cason   Patient ID: [id number]   Sex: Female   YOB: 1965    Primary Care Provider: Birdie CARDONA   Referring Provider: Phillip Cano MD    Visit Date: December 29, 2020    Provider: Carrington Shell MD   Location: [facility]   Location Address: [address] [place] [address]  [id number]   Location Phone: [phone]          Chief Complaint  · Left shoulder pain      History Of Present Illness  Amanda Cason is a 55 year old /White female who presents today to [facility].      Patient presents today with a follow-up of left shoulder pain. Patient is s/p left shoulder arthroscopic subacromial decompression and mini open rotator cuff repair performed on 12/14/20. Patient sustained a left proximal humerus fracture and left shoulder rotator cuff tear when she slipped and fell in the bed of a truck on 9/10/20. Patient states she is doing okay today and states she has having trouble with pain control. Patient has been taking Percocet that gives her relief for two hours.       Past Medical History  Appendicitis; Diabetes; Diabetes mellitus type 2, noninsulin dependent; Diabetes mellitus, type 2; Diabetes Mellitus, Type II; Diabetic Eye Exam Last Completed; Diabetic Foot Exam Last Completed; Dry eye syndrome; Essential hypertension; GERD (gastroesophageal reflux disease); Glaucoma; Hyperlipidemia; Pap smear for cervical cancer screening; Pap Smear for Vaginal Cancer Screening; Reflux; Screening Mammogram; Uterine fibroid         Past Surgical History  Appendectomy; Colonoscopy; EAR Surgery; Tubal ligation         Medication List  diclofenac sodium 75 mg oral tablet,delayed release (DR/EC); gabapentin 300 mg oral capsule; krill oil 852-61-86-50 mg oral capsule; lisinopril 2.5 mg oral tablet; Lumigan 0.01 % ophthalmic drops; Percocet 7.5-325 mg oral tablet; rosuvastatin 10 mg oral tablet; XIGDUO XR 10 MG-1,000 MG TAB  "        Allergy List  NO KNOWN DRUG ALLERGIES       Allergies Reconciled  Family Medical History  Heart Disease; Diabetes, unspecified type; Breast Neoplasm; Diabetes Mellitus, Type II; Family history of diabetes mellitus         Reproductive History   2 Para 2 0 0 0       Social History  Alcohol (Never); ; No known infection risk; Recreational Drug Use (Never); Tobacco (Never)         Immunizations  Name Date Admin   Influenza Refused         Review of Systems  · Constitutional  o Denies  o : fever, chills, weight loss  · Cardiovascular  o Denies  o : chest pain, shortness of breath  · Gastrointestinal  o Denies  o : liver disease, heartburn, nausea, blood in stools  · Genitourinary  o Denies  o : painful urination, blood in urine  · Integument  o Denies  o : rash, itching  · Neurologic  o Denies  o : headache, weakness, loss of consciousness  · Musculoskeletal  o Denies  o : painful, swollen joints  · Psychiatric  o Denies  o : drug/alcohol addiction, anxiety, depression      Vitals  Date Time BP Position Site L\R Cuff Size HR RR TEMP (F) WT  HT  BMI kg/m2 BSA m2 O2 Sat FR L/min FiO2        2020 03:08 PM         140lbs 0oz 5'  4\" 24.03 1.69             Physical Examination  · Constitutional  o Appearance  o : well developed, well-nourished, no obvious deformities present  · Head and Face  o Head  o :   § Inspection  § : normocephalic  o Face  o :   § Inspection  § : no facial lesions  · Eyes  o Conjunctivae  o : conjunctivae normal  o Sclerae  o : sclerae white  · Ears, Nose, Mouth and Throat  o Ears  o :   § External Ears  § : appearance within normal limits  § Hearing  § : intact  o Nose  o :   § External Nose  § : appearance normal  · Neck  o Inspection/Palpation  o : normal appearance  o Range of Motion  o : full range of motion  · Respiratory  o Respiratory Effort  o : breathing unlabored  o Inspection of Chest  o : normal appearance  o Auscultation of Lungs  o : no audible wheezing or " rales  · Cardiovascular  o Heart  o : regular rate  · Gastrointestinal  o Abdominal Examination  o : soft and non-tender  · Skin and Subcutaneous Tissue  o General Inspection  o : intact, no rashes  · Psychiatric  o General  o : Alert and oriented x3  o Judgement and Insight  o : judgment and insight intact  o Mood and Affect  o : mood normal, affect appropriate  · Left Arm  o Inspection  o : Sensation grossly intact. Neurovascular intact. Skin intact. Incisions well healing with no signs of infection. Positive pulses. Sutures removed. Limited to range of motion, secondary to pain. Mild swelling. No skin discoloration.           Assessment  · Aftercare following left shoulder arthroscopic subacromial decompression and mini open rotator cuff repair     V54.81  · Left shoulder pain, unspecified chronicity     719.41/M25.512      Plan  · Medications  o Medications have been Reconciled  o Transition of Care or Provider Policy  · Instructions  o Dr. Shell saw and examined the patient and agrees with plan.   o Reviewed the patient's Past Medical, Social, and Family history as well as the ROS at today's visit, no changes.  o Call or return if worsening symptoms.  o Follow Up in 4 weeks.   o This note was transcribed by Carly Soto. trever  o Discussed diagnosis and treatment options with the patient. Patient will start physical therapy and follow-up in 4 weeks. Patient given a refill prescription for Percocet. Patient given a new prescription for Gabapentin to help her sleep at night and help relieve pain.             Electronically Signed by: Carly Soto-, Other -Author on December 31, 2020 08:37:53 AM  Electronically Co-signed by: Carrington Shell MD -Reviewer on January 1, 2021 10:05:30 PM

## 2021-05-15 VITALS — HEIGHT: 63 IN | BODY MASS INDEX: 23.74 KG/M2 | HEART RATE: 89 BPM | WEIGHT: 134 LBS | OXYGEN SATURATION: 99 %

## 2021-05-15 VITALS — WEIGHT: 140 LBS | HEIGHT: 64 IN | HEART RATE: 80 BPM | BODY MASS INDEX: 23.9 KG/M2 | OXYGEN SATURATION: 97 %

## 2021-05-15 VITALS — WEIGHT: 145 LBS | HEIGHT: 64 IN | BODY MASS INDEX: 24.75 KG/M2

## 2021-05-15 VITALS — HEIGHT: 63 IN | HEART RATE: 79 BPM | WEIGHT: 135 LBS | OXYGEN SATURATION: 97 % | BODY MASS INDEX: 23.92 KG/M2

## 2021-05-15 VITALS — BODY MASS INDEX: 24.95 KG/M2 | OXYGEN SATURATION: 95 % | HEIGHT: 64 IN | WEIGHT: 146.12 LBS | HEART RATE: 81 BPM

## 2021-05-15 VITALS
BODY MASS INDEX: 22.02 KG/M2 | HEIGHT: 66 IN | OXYGEN SATURATION: 100 % | HEART RATE: 54 BPM | SYSTOLIC BLOOD PRESSURE: 99 MMHG | DIASTOLIC BLOOD PRESSURE: 63 MMHG | WEIGHT: 137 LBS

## 2021-05-15 VITALS
HEART RATE: 73 BPM | OXYGEN SATURATION: 99 % | HEIGHT: 66 IN | DIASTOLIC BLOOD PRESSURE: 64 MMHG | SYSTOLIC BLOOD PRESSURE: 97 MMHG | WEIGHT: 130.12 LBS | BODY MASS INDEX: 20.91 KG/M2

## 2021-05-15 VITALS — BODY MASS INDEX: 24.31 KG/M2 | HEIGHT: 64 IN | HEART RATE: 73 BPM | WEIGHT: 142.37 LBS | OXYGEN SATURATION: 98 %

## 2021-05-15 VITALS
WEIGHT: 144.12 LBS | BODY MASS INDEX: 24.6 KG/M2 | SYSTOLIC BLOOD PRESSURE: 102 MMHG | DIASTOLIC BLOOD PRESSURE: 64 MMHG | HEIGHT: 64 IN | OXYGEN SATURATION: 100 % | HEART RATE: 65 BPM

## 2021-05-15 VITALS — BODY MASS INDEX: 24.01 KG/M2 | OXYGEN SATURATION: 98 % | HEIGHT: 63 IN | WEIGHT: 135.5 LBS | HEART RATE: 72 BPM

## 2021-05-15 VITALS — OXYGEN SATURATION: 98 % | HEART RATE: 68 BPM | WEIGHT: 135.37 LBS | HEIGHT: 64 IN | BODY MASS INDEX: 23.11 KG/M2

## 2021-05-15 VITALS — HEIGHT: 63 IN | HEART RATE: 85 BPM | OXYGEN SATURATION: 94 %

## 2021-05-15 VITALS — BODY MASS INDEX: 23.22 KG/M2 | WEIGHT: 136 LBS | HEART RATE: 79 BPM | HEIGHT: 64 IN | OXYGEN SATURATION: 98 %

## 2021-05-15 VITALS — BODY MASS INDEX: 21.38 KG/M2 | WEIGHT: 133 LBS | OXYGEN SATURATION: 99 % | HEART RATE: 75 BPM | HEIGHT: 66 IN

## 2021-05-15 VITALS
HEART RATE: 68 BPM | HEIGHT: 64 IN | SYSTOLIC BLOOD PRESSURE: 111 MMHG | BODY MASS INDEX: 23.22 KG/M2 | WEIGHT: 136 LBS | DIASTOLIC BLOOD PRESSURE: 51 MMHG

## 2021-05-15 VITALS — HEART RATE: 75 BPM | OXYGEN SATURATION: 99 % | WEIGHT: 134.25 LBS | HEIGHT: 63 IN | BODY MASS INDEX: 23.79 KG/M2

## 2021-05-16 VITALS
BODY MASS INDEX: 23.69 KG/M2 | HEART RATE: 63 BPM | DIASTOLIC BLOOD PRESSURE: 73 MMHG | WEIGHT: 147.37 LBS | HEIGHT: 66 IN | SYSTOLIC BLOOD PRESSURE: 110 MMHG

## 2021-05-16 VITALS
WEIGHT: 142 LBS | HEART RATE: 76 BPM | OXYGEN SATURATION: 99 % | HEIGHT: 66 IN | DIASTOLIC BLOOD PRESSURE: 65 MMHG | BODY MASS INDEX: 22.82 KG/M2 | SYSTOLIC BLOOD PRESSURE: 102 MMHG

## 2021-05-16 VITALS
HEART RATE: 76 BPM | TEMPERATURE: 97.4 F | SYSTOLIC BLOOD PRESSURE: 98 MMHG | HEIGHT: 66 IN | BODY MASS INDEX: 24.27 KG/M2 | OXYGEN SATURATION: 98 % | WEIGHT: 151 LBS | DIASTOLIC BLOOD PRESSURE: 61 MMHG

## 2021-05-16 VITALS
WEIGHT: 149 LBS | HEART RATE: 64 BPM | SYSTOLIC BLOOD PRESSURE: 90 MMHG | HEIGHT: 66 IN | BODY MASS INDEX: 23.95 KG/M2 | DIASTOLIC BLOOD PRESSURE: 64 MMHG

## 2021-05-24 ENCOUNTER — HOSPITAL ENCOUNTER (OUTPATIENT)
Dept: LAB | Facility: HOSPITAL | Age: 56
Discharge: HOME | End: 2021-05-24
Attending: NURSE PRACTITIONER

## 2021-05-24 ENCOUNTER — OFFICE VISIT CONVERTED (OUTPATIENT)
Dept: FAMILY MEDICINE CLINIC | Facility: CLINIC | Age: 56
End: 2021-05-24
Attending: NURSE PRACTITIONER

## 2021-05-24 LAB
ALBUMIN SERPL-MCNC: 4.3 G/DL (ref 3.5–5)
ALBUMIN/GLOB SERPL: 1.7 {RATIO} (ref 1.4–2.6)
ALP SERPL-CCNC: 81 U/L (ref 53–141)
ALT SERPL-CCNC: 22 U/L (ref 10–40)
ANION GAP SERPL CALC-SCNC: 16 MMOL/L (ref 8–19)
AST SERPL-CCNC: 16 U/L (ref 15–50)
BASOPHILS # BLD AUTO: 0.03 10*3/UL (ref 0–0.2)
BASOPHILS NFR BLD AUTO: 0.4 % (ref 0–3)
BILIRUB SERPL-MCNC: 0.27 MG/DL (ref 0.2–1.3)
BUN SERPL-MCNC: 18 MG/DL (ref 5–25)
BUN/CREAT SERPL: 22 {RATIO} (ref 6–20)
CALCIUM SERPL-MCNC: 8.8 MG/DL (ref 8.7–10.4)
CHLORIDE SERPL-SCNC: 107 MMOL/L (ref 99–111)
CHOLEST SERPL-MCNC: 172 MG/DL (ref 107–200)
CHOLEST/HDLC SERPL: 3.7 {RATIO} (ref 3–6)
CONV ABS IMM GRAN: 0.03 10*3/UL (ref 0–0.2)
CONV CO2: 21 MMOL/L (ref 22–32)
CONV CREATININE URINE, RANDOM: 71.6 MG/DL (ref 10–300)
CONV IMMATURE GRAN: 0.4 % (ref 0–1.8)
CONV MICROALBUM.,U,RANDOM: <12 MG/L (ref 0–20)
CONV TOTAL PROTEIN: 6.9 G/DL (ref 6.3–8.2)
CREAT UR-MCNC: 0.83 MG/DL (ref 0.5–0.9)
DEPRECATED RDW RBC AUTO: 39.8 FL (ref 36.4–46.3)
EOSINOPHIL # BLD AUTO: 0.09 10*3/UL (ref 0–0.7)
EOSINOPHIL # BLD AUTO: 1.2 % (ref 0–7)
ERYTHROCYTE [DISTWIDTH] IN BLOOD BY AUTOMATED COUNT: 12 % (ref 11.7–14.4)
EST. AVERAGE GLUCOSE BLD GHB EST-MCNC: 171 MG/DL
GFR SERPLBLD BASED ON 1.73 SQ M-ARVRAT: >60 ML/MIN/{1.73_M2}
GLOBULIN UR ELPH-MCNC: 2.6 G/DL (ref 2–3.5)
GLUCOSE SERPL-MCNC: 124 MG/DL (ref 65–99)
HBA1C MFR BLD: 7.6 % (ref 3.5–5.7)
HCT VFR BLD AUTO: 39.9 % (ref 37–47)
HDLC SERPL-MCNC: 47 MG/DL (ref 40–60)
HGB BLD-MCNC: 13.3 G/DL (ref 12–16)
LDLC SERPL CALC-MCNC: 100 MG/DL (ref 70–100)
LYMPHOCYTES # BLD AUTO: 2.2 10*3/UL (ref 1–5)
LYMPHOCYTES NFR BLD AUTO: 30 % (ref 20–45)
MCH RBC QN AUTO: 30.2 PG (ref 27–31)
MCHC RBC AUTO-ENTMCNC: 33.3 G/DL (ref 33–37)
MCV RBC AUTO: 90.5 FL (ref 81–99)
MICROALBUMIN/CREAT UR: 16.8 MG/G{CRE} (ref 0–35)
MONOCYTES # BLD AUTO: 0.45 10*3/UL (ref 0.2–1.2)
MONOCYTES NFR BLD AUTO: 6.1 % (ref 3–10)
NEUTROPHILS # BLD AUTO: 4.53 10*3/UL (ref 2–8)
NEUTROPHILS NFR BLD AUTO: 61.9 % (ref 30–85)
NRBC CBCN: 0 % (ref 0–0.7)
OSMOLALITY SERPL CALC.SUM OF ELEC: 293 MOSM/KG (ref 273–304)
PLATELET # BLD AUTO: 250 10*3/UL (ref 130–400)
PMV BLD AUTO: 9.7 FL (ref 9.4–12.3)
POTASSIUM SERPL-SCNC: 4 MMOL/L (ref 3.5–5.3)
RBC # BLD AUTO: 4.41 10*6/UL (ref 4.2–5.4)
SODIUM SERPL-SCNC: 140 MMOL/L (ref 135–147)
T4 FREE SERPL-MCNC: 1.1 NG/DL (ref 0.9–1.8)
TRIGL SERPL-MCNC: 126 MG/DL (ref 40–150)
TSH SERPL-ACNC: 1.06 M[IU]/L (ref 0.27–4.2)
VLDLC SERPL-MCNC: 25 MG/DL (ref 5–37)
WBC # BLD AUTO: 7.33 10*3/UL (ref 4.8–10.8)

## 2021-06-03 ENCOUNTER — OFFICE VISIT CONVERTED (OUTPATIENT)
Dept: NEUROLOGY | Facility: CLINIC | Age: 56
End: 2021-06-03
Attending: PSYCHIATRY & NEUROLOGY

## 2021-06-03 ENCOUNTER — CONVERSION ENCOUNTER (OUTPATIENT)
Dept: NEUROLOGY | Facility: CLINIC | Age: 56
End: 2021-06-03

## 2021-06-05 NOTE — PROGRESS NOTES
Progress Note      Patient Name: Amanda Cason   Patient ID: [id number]   Sex: Female   YOB: 1965    Primary Care Provider: Birdie CARDONA   Referring Provider: Phillip Cano MD    Visit Date: May 24, 2021    Provider: DULCE Cabrales   Location: [facility]   Location Address: [address] [place] [address], [address]  MARYELLEN Vasquez  [id number]   Location Phone: [phone]          Chief Complaint  · 6 month follow up      History Of Present Illness  Amanda Cason is a 55 year old /White female who presents for evaluation and treatment of:      Patient is here for a 6 month follow up. She has no concerns to address today.    Labs: due and is fasting today.  Mammogram: due and patient declines due to not covered  by insurance   Colonoscopy: due, patient declines due to not covered by insurance  DM foot Due  DM eye exam: 5/20/2020, due, will schedule with Ravinder         Past Medical History  Disease Name Date Onset Notes   Appendicitis --  --    Diabetes --  --    Diabetes mellitus type 2, noninsulin dependent --  --    Diabetes mellitus, type 2 02/12/2018 --    Diabetes Mellitus, Type II --  --    Diabetic Eye Exam Last Completed 6/23/2015 Saulo Siu    Diabetic Foot Exam Last Completed 11/20/2019 Done in office during visit   Dry eye syndrome --  --    Essential hypertension 02/12/2018 --    GERD (gastroesophageal reflux disease) 02/12/2018 --    Glaucoma --  --    Hyperlipidemia --  --    Pap smear for cervical cancer screening 8/30/2017 --    Pap Smear for Vaginal Cancer Screening 8/20/2017 --    Reflux --  --    Screening Mammogram 11/20/2019 declined   Uterine fibroid 08/10/2016 --          Past Surgical History  Procedure Name Date Notes   Appendectomy childhood --    Colonoscopy 11/20/2019 declined   EAR Surgery --  x 3 hole in ear   Tubal ligation '86 --          Medication List  Name Date Started Instructions   diclofenac sodium 75 mg oral  tablet,delayed release (DR/EC) 2021 TAKE 1 TABLET BY MOUTH TWICE A DAY WITH FOOD   krill oil 473-92-14-50 mg oral capsule  take 1 capsule by oral route daily   lisinopril 2.5 mg oral tablet 04/15/2021 TAKE 1 TABLET BY MOUTH EVERY DAY   Xigduo XR 10-1,000 mg oral tablet, IR - ER, biphasic 24hr 04/15/2021 take 1 tablet by oral route once daily in the morning with food for 90 days         Allergy List  Allergen Name Date Reaction Notes   NO KNOWN DRUG ALLERGIES --  --  --          Family Medical History  Disease Name Relative/Age Notes   Heart Disease Father/   Father   Diabetes, unspecified type Brother/  Mother/  Sister/   Mother; Sister; Brother   Breast Neoplasm Mother/   --    Diabetes Mellitus, Type II Mother/   --    Family history of diabetes mellitus Brother/  Mother/  Sister/   Mother; Sister; Brother         Reproductive History  Menstrual   Age Menarche: 12   Pregnancy Summary   Total Pregnancies: 2 Full Term: 2 Premature: 0   Ab Induced: 0 Ab Spontaneous: 0 Ectopics: 0   Multiples: 0 Livin         Social History  Finding Status Start/Stop Quantity Notes   Alcohol Never --/-- --  2020 - does not drink    --  --/-- --  --    No known infection risk --  --/-- --  --    Recreational Drug Use Never --/-- --  no   Tobacco Never --/-- --  2020 - never smoker  never smoker  never smoker  never         Immunizations  NameDate Admin Mfg Trade Name Lot Number Route Inj VIS Given VIS Publication   InfluenzaRefused 2019 NE Not Entered  NE NE     Comments: declined         Review of Systems  · Constitutional  o Denies  o : fever, fatigue, weight loss, weight gain  · Breasts  o Denies  o : lumps, tenderness, swelling, nipple discharge  · Cardiovascular  o Denies  o : lower extremity edema, claudication, chest pressure, palpitations  · Respiratory  o Denies  o : shortness of breath, wheezing, cough, hemoptysis, dyspnea on exertion  · Gastrointestinal  o Denies  o : nausea, vomiting,  "diarrhea, constipation, abdominal pain      Vitals  Date Time BP Position Site L\R Cuff Size HR RR TEMP (F) WT  HT  BMI kg/m2 BSA m2 O2 Sat FR L/min FiO2 HC       05/24/2021 09:26 /72 Sitting    71 - R   153lbs 4oz 5'  6\" 24.73 1.8 98 %            Physical Examination  · Constitutional  o Appearance  o : well-nourished, well developed, alert, in no acute distress  · Ears, Nose, Mouth and Throat  o Ears  o :   § External Ears  § : appearance within normal limits, no lesions present  § Otoscopic Examination  § : tympanic membrane appearance within normal limits bilaterally without perforations, mobility normal  o Nose  o :   § External Nose  § : normal stucture noted.  § Intranasal Exam  § : no swelling, reddness, turbs normal grey.  o Oral Cavity  o :   § Oral Mucosa  § : oral mucosa normal without pallor or cyanosis  § Lips  § : lip appearance normal  § Teeth  § : normal dentition for age  § Gums  § : gums pink, non-swollen, no bleeding present  § Tongue  § : tongue appearance normal  § Palate  § : hard palate normal, soft palate appearance normal  o Throat  o :   § Oropharynx  § : no inflammation or lesions present, tonsils within normal limits  · Respiratory  o Respiratory Effort  o : breathing unlabored  o Auscultation of Lungs  o : normal breath sounds throughout  · Cardiovascular  o Heart  o :   § Auscultation of Heart  § : regular rate and rhythm, no murmurs, gallops or rubs  § Palpation of Heart  § : normal apical impulse, no cardiac thrill present  o Peripheral Vascular System  o :   § Carotid Arteries  § : normal pulses bilaterally, no bruits present  § Pedal Pulses  § : pulses 2 bilaterally  § Extremities  § : no cyanosis, clubbing or edema; less than 2 second refill noted  · Gastrointestinal  o Abdominal Examination  o : abdomen nontender to palpation, tone normal without rigidity or guarding, no masses present, abdominal contour scaphoid  o Liver and spleen  o : no hepatomegaly present, liver " nontender to palpation, spleen not palpable  · Musculoskeletal  o Right Upper Extremity  o :   § Inspection/Palpation  § : no tenderness to palpation  § Range of Motion  § : range of motion normal, no joint crepitus or pain with motion present  o Left Upper Extremity  o :   § Inspection/Palpation  § : no tenderness to palpation  § Range of Motion  § : range of motion normal, no joint crepitus present, no pain with joint motion  o Right Lower Extremity  o :   § Inspection/Palpation  § : no joint or limb tenderness to palpation  § Range of Motion  § : range of motion normal, no joint crepitations present, no pain on motion  o Left Lower Extremity  o :   § Inspection/Palpation  § : no joint or limb tenderness to palpation  § Range of Motion  § : range of motion normal, no joint crepitations present, no pain on motion  · Skin and Subcutaneous Tissue  o General Inspection  o : no rashes or lesions present, no areas of discoloration  · Neurologic  o Mental Status Examination  o :   § Orientation  § : grossly oriented to person, place and time  o Cranial Nerves  o : cranial nerves intact and symmetric throughout  · Psychiatric  o Mood and Affect  o : mood normal, affect appropriate, denies any SI/HI  · Left DM Foot Exam  o Sensation  o : normal sensory exam perceptible to 10-gram nylon monofilament exam (5/5), vibration and light touch.  o Visual Inspection  o : visual inspection is normal with no signs of breakdown, ulcerations or deformities unless otherwise noted.   o Vascular  o : palpable dorsalis pedis and posterir tibialis pulses present, normal capillary refill  · Right DM Foot Exam  o Sensation  o : normal sensory exam perceptible to 10-gram nylon monofilament exam (5/5), vibration and light touch.  o Visual Inspection  o : visual inspection is normal with no signs of breakdown, ulcerations or deformities unless otherwise noted.   o Vascular  o : palpable dorsalis pedis and posterir tibialis pulses present, normal  capillary refill          Assessment  · Diabetes mellitus, type 2     250.00/E11.9  · Essential hypertension     401.9/I10  · GERD (gastroesophageal reflux disease)     530.81/K21.9  · Hyperlipidemia     272.4/E78.5      Plan  · Orders  o Urine microalbumin (65553) - 250.00/E11.9 - 05/24/2021  o Diabetic Dilated Eye Exam Consult with Ophthalmology/Optometry (DMEYE) - 250.00/E11.9 - 07/24/2021   patient will self schedule at [facility]. Given DM Eye paper to fax results to office  o Diabetic Foot (Motor and Sensory) Exam Completed [facility] (, , 2028F) - 250.00/E11.9 - 05/24/2021   completed in office  o CBC with Auto Diff [facility] (46521) - 250.00/E11.9, 401.9/I10, 530.81/K21.9, 272.4/E78.5 - 05/24/2021  o CMP [facility] (32096) - 250.00/E11.9, 401.9/I10, 530.81/K21.9, 272.4/E78.5 - 05/24/2021  o Hgb A1c [facility] (91306) - 250.00/E11.9, 401.9/I10, 530.81/K21.9, 272.4/E78.5 - 05/24/2021  o Lipid Panel [facility] (68154) - 250.00/E11.9, 401.9/I10, 530.81/K21.9, 272.4/E78.5 - 05/24/2021  o Thyroid Profile (02353, 03575, THYII) - 250.00/E11.9, 401.9/I10, 530.81/K21.9, 272.4/E78.5 - 05/24/2021  o IOVRY Report (KASPR) - - 05/24/2021  o ACO-41: Dilated Diabetic eye exam completed this year and results in chart/reviewed (2022F) - - 05/24/2021  o ACO-39: Current medications updated and reviewed (, 1159F) - - 05/24/2021  · Medications  o Medications have been Reconciled  o Transition of Care or Provider Policy  · Instructions  o Daily foot care. Avoid walking barefoot. Annual Dilated Eye Exam.  o Discussed with patient blood pressure monitoring, hemoglobin A1C levels need to be below 7.0, and LDL (Lipid) goals below 70.  o Patient advised to monitor blood pressure (B/P) at home and journal readings. Patient informed that a B/P reading at home of more than 130/80 is considered hypertension. For readings greater [id number]/90 or higher patient is advised to follow up in the office with readings for management. Patient advised to limit  sodium intake.  o Maintain a healthy weight. Avoid tight fitting clothes. Avoid fried, fatty foods, tomato sauce, chocolate, mint, garlic, onion, alcohol. caffeine. Eat smaller meals, dont lie down after a meal, dont smoke. Elevate the head of your bed 6-9 inches.  o Advised that cheeses and other sources of dairy fats, animal fats, fast food, and the extras (candy, pastries, pies, doughnuts and cookies) all contain LDL raising nutrients. Advised to increase fruits, vegetables, whole grains, and to monitor portion sizes.   o Obtained a written consent for IVORY query. Discussed the risk and benefits of the use of controlled substances with the patient, including the risk of tolerance and drug dependence. The patient has been counseled on the need to have an exit strategy, including potentially discontinuing the use of controlled substances. IVORY has or will be reviewed as soon as it becomes avaliable.  o Patient was educated/instructed on their diagnosis, treatment and medications prior to discharge from the clinic today.  o Patient instructed to seek medical attention urgently for new or worsening symptoms.  o Call the office with any concerns or questions.  o encouraged pt to call insurance and recheck on coverage for mamm and colonoscopy, if covered will be happy to schedule  · Disposition  o Call or Return if symptoms worsen or persist.  o Return Visit Request in/on 6 months +/- 2 weeks (75598).            Electronically Signed by: Birdie Damian APRN -Author on May 24, 2021 09:57:54 AM

## 2021-06-05 NOTE — H&P
History and Physical      Patient Name: Amanda Cason   Patient ID: [id number]   Sex: Female   YOB: 1965    Primary Care Provider: Birdie CARDONA   Referring Provider: Carrington Shell MD    Visit Date: Mita 3, 2021    Provider: Jimmy Meza MD   Location: [facility]   Location Address: [address] [place] [address]  [id number]   Location Phone: [phone]          Chief Complaint            History Of Present Illness  Amanda Cason is a 55 year old /White female who presents today to [facility] today referred from Carrington Shell MD.      55-year-old woman evaluated for left arm pain, limited range of motion, difficulty in closing her hands, changes in her skin color, swelling of her hand.  This happened after she had surgical repair of her left shoulder in January.  She states that she fell off a truck and eventually was found to have a fracture on her left shoulder that healed spontaneously.  She was also found to have a rotator cuff tear and she had surgery to her rotator cuff about 2 to 3 months later.  She developed symptoms after the surgery.  She states that it hurts to move her arm and it hurts to be touched.  Her severe pain in her wrist and her hands.  She cannot close her fingers.  She was seeing a physical therapist in [place] and she was told that it was all in her head.    I saw her last year for exactly the same symptoms of her right arm that occurred after she had similar pain in her right shoulder that she developed the same symptoms.  Diagnosed her to have complex regional pain syndrome.  It has gotten significantly better.  She is not able to move her right hand was in the past she had difficulty in moving her right fingers.       Past Medical History  Appendicitis; Diabetes; Diabetes mellitus type 2, noninsulin dependent; Diabetes mellitus, type 2; Diabetes Mellitus, Type II; Diabetic Eye Exam  Last Completed; Diabetic Foot Exam Last Completed; Dry eye syndrome; Essential hypertension; GERD (gastroesophageal reflux disease); Glaucoma; Hyperlipidemia; Pap smear for cervical cancer screening; Pap Smear for Vaginal Cancer Screening; Reflux; Screening Mammogram; Uterine fibroid         Past Surgical History  Appendectomy; Colonoscopy; EAR Surgery; Tubal ligation         Medication List  diclofenac sodium 75 mg oral tablet,delayed release (DR/EC); krill oil 559-07-47-50 mg oral capsule; lisinopril 2.5 mg oral tablet; Xigduo XR 10-1,000 mg oral tablet, IR - ER, biphasic 24hr         Allergy List  NO KNOWN DRUG ALLERGIES         Family Medical History  Heart Disease; Diabetes, unspecified type; Breast Neoplasm; Diabetes Mellitus, Type II; Family history of diabetes mellitus         Reproductive History   2 Para 2 0 0 0       Social History  Alcohol (Never); ; No known infection risk; Recreational Drug Use (Never); Tobacco (Never)         Immunizations  Name Date Admin   Influenza Refused         Review of Systems  · Constitutional  o Denies  o : chills, excessive sweating, fatigue, fever, sycope/passing out, weight gain, weight loss  · Eyes  o Denies  o : changes in vision, blurry vision, double vision  · HENT  o Denies  o : loss of hearing, ringing in the ears, ear aches, sore throat, nasal congestion, sinus pain, nose bleeds, seasonal allergies  · Cardiovascular  o Denies  o : blood clots, swollen legs, anemia, easy burising or bleeding, transfusions  · Respiratory  o Denies  o : shortness of breath, dry cough, productive cough, pneumonia, COPD  · Gastrointestinal  o Denies  o : difficulty swallowing, reflux  · Genitourinary  o Denies  o : incontinence  · Neurologic  o Admits  o : numbness/tingling/paresthesia   o Denies  o : headache, seizure, stroke, tremor, loss of balance, falls, dizziness/vertigo, difficulty with sleep, difficulty with coordination, difficulty with dexterity,  "weakness  · Musculoskeletal  o Denies  o : neck stiffness/pain, swollen lymph nodes, muscle aches, joint pain, weakness, spasms, sciatica, pain radiating in arm, pain radiating in leg, low back pain  · Endocrine  o Denies  o : diabetes, thyroid disorder  · Psychiatric  o Denies  o : anxiety, depression      Vitals  Date Time BP Position Site L\R Cuff Size HR RR TEMP (F) WT  HT  BMI kg/m2 BSA m2 O2 Sat FR L/min FiO2 HC       06/03/2021 02:35 /78 Sitting    70 - R 18  155lbs 0oz 5'  6\" 25.02 1.81             Physical Examination     There is no weakness of the left upper extremity individual muscle testing.  Reflex symmetrical in the biceps, triceps, brachioradialis.  Sensory symmetrical to pinprick in the upper extremities.  Her left hand feels warmer to touch than the right hand.  There is mild swelling of her left hand.  There is darker skin color discoloration of her left hand compared to the right hand.  She has difficulty in making a fist.           Assessment  · Complex regional pain syndrome I     337.20/G90.50  Nerve conduction and EMG study of the left arm and hand is normal. Her symptoms is highly consistent with complex regional pain syndrome. She had this diagnosis of the right arm in the past. I would recommend for her to continue aggressive physical therapy.    Total time spent with patient and coordinating patient care was 30 minutes.      Plan  · Orders  o Muscle test done with Nerve test Complete (77537) - 337.20/G90.50 - 06/03/2021  o Nerve conduction studies; 3-4 studies (02061) - 337.20/G90.50 - 06/03/2021  · Medications  o Medications have been Reconciled  o Transition of Care or Provider Policy  · Instructions  o Encouraged to follow-up with Primary Care Provider for preventative care.            Electronically Signed by: Jimmy Meza MD -Author on Mita 3, 2021 04:22:31 PM  "

## 2021-06-05 NOTE — H&P
History and Physical      Patient Name: Amanda Cason   Patient ID: [id number]   Sex: Female   YOB: 1965    Primary Care Provider: Birdie CARDONA   Referring Provider: Phillip Cano MD    Visit Date: May 11, 2021    Provider: Carrington Shell MD   Location: [facility]   Location Address: [address] [place] [address]  [id number]   Location Phone: [phone]          Chief Complaint  · Right hand pain      History Of Present Illness  Amanda Cason is a 55 year old /White female who presents today to [facility].      The patient presents here today for evaluation of her right hand. She injured her hand while setting a trap to catch her cat and tripped over her dog leash and landed on her right hand 2 weeks ago. The patient was seen at a [facility] in [place].  She was placed into a splint.       Past Medical History  Appendicitis; Diabetes; Diabetes mellitus type 2, noninsulin dependent; Diabetes mellitus, type 2; Diabetes Mellitus, Type II; Diabetic Eye Exam Last Completed; Diabetic Foot Exam Last Completed; Dry eye syndrome; Essential hypertension; GERD (gastroesophageal reflux disease); Glaucoma; Hyperlipidemia; Pap smear for cervical cancer screening; Pap Smear for Vaginal Cancer Screening; Reflux; Screening Mammogram; Uterine fibroid         Past Surgical History  Appendectomy; Colonoscopy; EAR Surgery; Tubal ligation         Medication List  diclofenac sodium 75 mg oral tablet,delayed release (DR/EC); krill oil 186-84-60-50 mg oral capsule; lisinopril 2.5 mg oral tablet; Xigduo XR 10-1,000 mg oral tablet, IR - ER, biphasic 24hr; XIGDUO XR 10 MG-1,000 MG TAB         Allergy List  NO KNOWN DRUG ALLERGIES       Allergies Reconciled  Family Medical History  Heart Disease; Diabetes, unspecified type; Breast Neoplasm; Diabetes Mellitus, Type II; Family history of diabetes mellitus         Reproductive History   2 Para 2 0 0 0       Social  History  Alcohol (Never); ; No known infection risk; Recreational Drug Use (Never); Tobacco (Never)         Review of Systems  · Constitutional  o Denies  o : fever, chills, weight loss  · Cardiovascular  o Denies  o : chest pain, shortness of breath  · Gastrointestinal  o Denies  o : liver disease, heartburn, nausea, blood in stools  · Genitourinary  o Denies  o : painful urination, blood in urine  · Integument  o Denies  o : rash, itching  · Neurologic  o Denies  o : headache, weakness, loss of consciousness  · Musculoskeletal  o Denies  o : painful, swollen joints  · Psychiatric  o Denies  o : drug/alcohol addiction, anxiety, depression      Physical Examination  · Constitutional  o Appearance  o : well developed, well-nourished, no obvious deformities present  · Head and Face  o Head  o :   § Inspection  § : normocephalic  o Face  o :   § Inspection  § : no facial lesions  · Eyes  o Conjunctivae  o : conjunctivae normal  o Sclerae  o : sclerae white  · Ears, Nose, Mouth and Throat  o Ears  o :   § External Ears  § : appearance within normal limits  § Hearing  § : intact  o Nose  o :   § External Nose  § : appearance normal  · Neck  o Inspection/Palpation  o : normal appearance  o Range of Motion  o : full range of motion  · Respiratory  o Respiratory Effort  o : breathing unlabored  o Inspection of Chest  o : normal appearance  o Auscultation of Lungs  o : no audible wheezing or rales  · Cardiovascular  o Heart  o : regular rate  · Gastrointestinal  o Abdominal Examination  o : soft and non-tender  · Skin and Subcutaneous Tissue  o General Inspection  o : intact, no rashes  · Psychiatric  o General  o : Alert and oriented x3  o Judgement and Insight  o : judgment and insight intact  o Mood and Affect  o : mood normal, affect appropriate  · Right Hand  o Inspection  o : Limited ROM of 4th and 5th fingers. Unable to fully flex fingers. Minimal tenderness over 5th metacarpal/ no deformity. Sensation to light  touch median, radial, ulnar nerve. Positive AIN, PIN, ulnar nerve. Positive pulses  · Imaging  o Imaging  o : X-ray-comminuted fracture of 5th metacarpal fracture of the neck and head. Mid displacement.           Assessment  · Arthralgia of right hand     719.44/M25.541  · Right 5th Metacarpal bone fracture     815.00/S62.309A      Plan  · Medications  o Medications have been Reconciled  o Transition of Care or Provider Policy  · Instructions  o Reviewed the patient's Past Medical, Social, and Family history as well as the ROS at today's visit, no changes.  o Call or return if worsening symptoms.  o The above service was scribed by Dori Romero on my behalf and I attest to the accuracy of the note. jsb  o Discussed the treatment plan with the patient. Placed the patient in a wrist brace with tootie straps to the 4th and 5th fingers. Can remove to work on ROM of the fingers. Follow up in 2 weeks with repeat x-rays.   o Electronically Identified Patient Education Materials Provided Electronically            Electronically Signed by: Dori Romero MA -Author on May 12, 2021 08:51:46 AM  Electronically Co-signed by: Carrington Shell MD -Reviewer on May 16, 2021 08:28:55 PM

## 2021-06-22 ENCOUNTER — OFFICE VISIT (OUTPATIENT)
Dept: ORTHOPEDIC SURGERY | Facility: CLINIC | Age: 56
End: 2021-06-22

## 2021-06-22 VITALS — HEART RATE: 67 BPM | OXYGEN SATURATION: 98 % | WEIGHT: 154.8 LBS | HEIGHT: 64 IN | BODY MASS INDEX: 26.43 KG/M2

## 2021-06-22 DIAGNOSIS — S62.306D CLOSED NONDISPLACED FRACTURE OF FIFTH METACARPAL BONE OF RIGHT HAND WITH ROUTINE HEALING, UNSPECIFIED PORTION OF METACARPAL, SUBSEQUENT ENCOUNTER: ICD-10-CM

## 2021-06-22 DIAGNOSIS — Z47.89 AFTERCARE FOLLOWING SURGERY OF THE MUSCULOSKELETAL SYSTEM: ICD-10-CM

## 2021-06-22 DIAGNOSIS — M79.641 RIGHT HAND PAIN: Primary | ICD-10-CM

## 2021-06-22 PROCEDURE — 99213 OFFICE O/P EST LOW 20 MIN: CPT | Performed by: PHYSICIAN ASSISTANT

## 2021-06-22 RX ORDER — DAPAGLIFLOZIN AND METFORMIN HYDROCHLORIDE 10; 1000 MG/1; MG/1
1 TABLET, FILM COATED, EXTENDED RELEASE ORAL DAILY
COMMUNITY
Start: 2021-04-19 | End: 2021-10-18

## 2021-06-22 RX ORDER — LISINOPRIL 2.5 MG/1
2.5 TABLET ORAL DAILY
COMMUNITY
Start: 2021-04-15 | End: 2021-10-18

## 2021-06-22 RX ORDER — IBUPROFEN 800 MG/1
TABLET ORAL
COMMUNITY
Start: 2021-05-06 | End: 2021-08-03

## 2021-06-22 RX ORDER — ROSUVASTATIN CALCIUM 10 MG/1
10 TABLET, COATED ORAL NIGHTLY
COMMUNITY
Start: 2021-05-17 | End: 2021-08-10

## 2021-06-22 NOTE — PROGRESS NOTES
"Chief Complaint  Pain and Follow-up of the Right Hand and Numbness, Pain, and Follow-up of the Left Shoulder    Subjective          [unfilled] presents to [facility] for   History of Present Illness    Patient presents for follow-up evaluation of left shoulder rotator cuff repair, 12/14/2020 and right fifth metacarpal fracture.  Patient states sensors appointment with Dr. Shell on May 11 she has weaned out of her brace and tootie strap she has been working on range of motion states she has been lifting pushing pulling objects with the hand without difficulty, no new complaints of the right hand.  Patient states her left arm is still bothering her she points to the area from her elbow to her shoulder she states that the MRI of the shoulder and the wrist and the hand did not satisfy her need for information regarding the pain she states she has in her left upper extremity.  Patient states it is a sharp pain she bumped it against something the other day and admits to pain in the left shoulder.  Patient had MRI of the left shoulder her cervical spine left wrist and left hand on 4/19/2021 with normal findings.  Patient also had nerve conduction study from Dr. Meza which came back: Normal nerve conduction study and EMG of the left arm.  Patient states she still has not been attending therapy since her therapist said that they think that her left hand and arm pain is \"all in her head \".    No Known Allergies     Social History     Socioeconomic History   • Marital status:      Spouse name: Not on file   • Number of children: Not on file   • Years of education: Not on file   • Highest education level: Not on file   Tobacco Use   • Smoking status: Never Smoker   • Smokeless tobacco: Never Used   Vaping Use   • Vaping Use: Never used   Substance and Sexual Activity   • Alcohol use: Never   • Drug use: Never        REVIEW OF SYSTEMS    Constitutional: Denies fevers, chills, weight " "loss  Cardiovascular: Denies chest pain, shortness of breath  Skin: Denies rashes, acute skin changes  Neurologic: Denies headache, loss of consciousness  MSK: Left arm pain, right hand pain      Objective   Vital Signs:   Pulse 67   Ht 162.6 cm (64\")   Wt 70.2 kg (154 lb 12.8 oz)   SpO2 98%   BMI 26.57 kg/m²     Body mass index is 26.57 kg/m².    Physical Exam  Left shoulder: Well-healed incisions, no erythema, no ecchymosis, no swelling.  Nontender to palpation.  Active forward elevation 110, active abduction 90, passive forward elevation 150, passive abduction 130, mild pain with range of motion  Left hand: Mild swelling to the fingers and thumb, mild tenderness to palpation to the fingers and thumb, limited flexion extension of fingers and thumb.  Neurovascular intact, patient unable to make a full fist due to thumb and finger range of motion limitation.  2+ capillary refill, 2+ radial ulnar pulses, pain with range of motion of thumb and fingers, negative grind test, negative snuffbox tenderness, wrist range of motion intact, mild pain with wrist range of motion.  Right hand: Nontender to palpation at fracture site, made patient able to make a full fist, 5 out of 5  strength, neurovascular intact, 2+ capillary refill.  Procedures    Imaging Results (Most Recent)     Procedure Component Value Units Date/Time    XR Hand 3+ View Right [[id number]] Resulted: 06/22/21 1004     Updated: 06/22/21 1005    Narrative:      • View:AP and Lateral view(s)  • Site: Right hand  • Indication: Right hand pain  • Study: X-rays ordered, taken in the office, and reviewed today  • X-ray: Good healing of fifth metacarpal fracture, no increased   displacement or angulation, callus formation evident.  • Comparative data: No comparative data found         MRI left hand without contrast, 4/19/2021, conclusion: No acute osseous abnormality. Mild to moderate osteoarthritic changes are present. No evidence of an inflammatory " arthropathy. No evidence of internal derangement. No findings identified to suggest an inflammatory arthropathy. MRI left wrist without contrast, 4/19/2021, conclusion: 1. No acute osseous abnormality. 2. Mild to moderate osteoarthritic changes are present. No suspicious or erosions identified to suggest an inflammatory arthropathy. 3. Mild tenosynovitis of the first through fourth extensor compartments. 4. Irregularity of the ulnar styloid which may be related to previous trauma. There are probable small degenerative tears of the foveal attachment in the body of the TFCC. 5. Mild tendinosis and tenosynovitis of the extensor carpi ulnaris tendon. MRI cervical spine without contrast, 4/19/2021, conclusion: Multilevel degenerative disc disease change. There is no focal disc protrusion or free disc fragment. MRI left shoulder without contrast, 4/19/2021, conclusion: 1. Postsurgical changes are seen related to previous rotator cuff repair. Heterogeneity is present within the rotator cuff, most pronounced within the supraspinatus tendon likely related to postsurgical change and probable healing of previous tears. No definite focal tear identified. Moderate glenohumeral osteoarthritis. 2. Mild acromioclavicular osteoarthritis. 3. Mild pan labral degenerative tearing with no evidence of a focal displaced tear.    Result Review :   The following data was reviewed by: RAY Escamilla on 06/22/2021:         EMG/NCV, 6/3/2021, Dr. Meza impression: Normal nerve conduction and EMG study of the left arm     Assessment and Plan    Diagnoses and all orders for this visit:    1. Right hand pain (Primary)  -     XR Hand 3+ View Right    2. Aftercare following surgery of left shoulder arthroscopic subacromial decompression, mini open rotator cuff repair, 12/14/2020  -     MRI Humerus Left Without Contrast; Future    3. Closed nondisplaced fracture of fifth metacarpal bone of right hand with routine healing,  unspecified portion of metacarpal, subsequent encounter        Reviewed x-ray with the patient, advised her to continue working on range of motion strength of the right hand.  Due to patient persistent complaint of pain from her elbow to her shoulder of the left upper extremity we will order MRI of the left humerus without contrast for further evaluation.  Follow-up after MRI.    Call or return if worsening symptoms.    Follow Up   No follow-ups on file.  Patient was given instructions and counseling regarding her condition or for health maintenance advice. Please see specific information pulled into the AVS if appropriate.

## 2021-07-01 ENCOUNTER — HOSPITAL ENCOUNTER (OUTPATIENT)
Dept: MRI IMAGING | Facility: HOSPITAL | Age: 56
Discharge: HOME | End: 2021-07-01
Admitting: PHYSICIAN ASSISTANT

## 2021-07-01 DIAGNOSIS — Z47.89 AFTERCARE FOLLOWING SURGERY OF THE MUSCULOSKELETAL SYSTEM: ICD-10-CM

## 2021-07-01 PROCEDURE — 73218 MRI UPPER EXTREMITY W/O DYE: CPT

## 2021-07-12 ENCOUNTER — PREP FOR SURGERY (OUTPATIENT)
Dept: OTHER | Facility: HOSPITAL | Age: 56
End: 2021-07-12

## 2021-07-12 ENCOUNTER — OFFICE VISIT (OUTPATIENT)
Dept: SURGERY | Facility: CLINIC | Age: 56
End: 2021-07-12

## 2021-07-12 VITALS — WEIGHT: 155.6 LBS | BODY MASS INDEX: 26.56 KG/M2 | HEIGHT: 64 IN

## 2021-07-12 DIAGNOSIS — Z12.11 SCREENING FOR MALIGNANT NEOPLASM OF COLON: Primary | ICD-10-CM

## 2021-07-12 PROCEDURE — S0285 CNSLT BEFORE SCREEN COLONOSC: HCPCS | Performed by: NURSE PRACTITIONER

## 2021-07-12 RX ORDER — SODIUM CHLORIDE 0.9 % (FLUSH) 0.9 %
3 SYRINGE (ML) INJECTION EVERY 12 HOURS SCHEDULED
Status: CANCELLED | OUTPATIENT
Start: 2021-07-12

## 2021-07-12 RX ORDER — SODIUM CHLORIDE 0.9 % (FLUSH) 0.9 %
10 SYRINGE (ML) INJECTION AS NEEDED
Status: CANCELLED | OUTPATIENT
Start: 2021-07-12

## 2021-07-12 NOTE — PROGRESS NOTES
"Chief Complaint  Colonoscopy (No complaints)    Subjective          Amanda Cason presents to [facility] GENER[facility] SUR[facility]  Patient is a 55-year-old white/ female that presents to the general surgery office for colonoscopy consultation.    Patient denies any abdominal pain, change in bowel habit, rectal bleeding.    Unsure of her family history.  Patient has lived in and out of foster homes most of her life.    No previous colonoscopy.    Patient has no known drug allergies.     Objective   Vital Signs:   Ht 162.6 cm (64\")   Wt 70.6 kg (155 lb 9.6 oz)   BMI 26.71 kg/m²     Physical Exam  Constitutional:       Appearance: Normal appearance.   Cardiovascular:      Rate and Rhythm: Normal rate.   Pulmonary:      Effort: Pulmonary effort is normal.   Abdominal:      General: Abdomen is flat.   Skin:     General: Skin is warm and dry.   Neurological:      General: No focal deficit present.      Mental Status: She is alert and oriented to person, place, and time.   Psychiatric:         Mood and Affect: Mood normal.         Behavior: Behavior normal.        Result Review :                 Assessment and Plan    There are no diagnoses linked to this encounter.    Follow Up   Return for Scheduled colonoscopy on 8/5/21 with Dr. Garcia at [facility], [place], operated by [facility].     Hospital call with arrival time.      Patient was given instructions and counseling regarding her condition or for health maintenance advice. Please see specific information pulled into the AVS if appropriate.       "

## 2021-07-15 VITALS
SYSTOLIC BLOOD PRESSURE: 110 MMHG | HEART RATE: 70 BPM | BODY MASS INDEX: 24.91 KG/M2 | HEIGHT: 66 IN | RESPIRATION RATE: 18 BRPM | WEIGHT: 155 LBS | DIASTOLIC BLOOD PRESSURE: 78 MMHG

## 2021-07-15 VITALS
DIASTOLIC BLOOD PRESSURE: 72 MMHG | WEIGHT: 153.25 LBS | HEIGHT: 66 IN | BODY MASS INDEX: 24.63 KG/M2 | OXYGEN SATURATION: 98 % | SYSTOLIC BLOOD PRESSURE: 111 MMHG | HEART RATE: 71 BPM

## 2021-07-30 ENCOUNTER — OFFICE VISIT (OUTPATIENT)
Dept: ORTHOPEDIC SURGERY | Facility: CLINIC | Age: 56
End: 2021-07-30

## 2021-07-30 VITALS — BODY MASS INDEX: 26.4 KG/M2 | HEIGHT: 64 IN | HEART RATE: 74 BPM | OXYGEN SATURATION: 98 % | WEIGHT: 154.6 LBS

## 2021-07-30 DIAGNOSIS — M25.642 STIFFNESS OF LEFT HAND JOINT: ICD-10-CM

## 2021-07-30 DIAGNOSIS — Z47.89 AFTERCARE FOLLOWING SURGERY OF THE MUSCULOSKELETAL SYSTEM: Primary | ICD-10-CM

## 2021-07-30 DIAGNOSIS — M79.641 RIGHT HAND PAIN: ICD-10-CM

## 2021-07-30 DIAGNOSIS — M79.642 LEFT HAND PAIN: ICD-10-CM

## 2021-07-30 DIAGNOSIS — S62.306D CLOSED NONDISPLACED FRACTURE OF FIFTH METACARPAL BONE OF RIGHT HAND WITH ROUTINE HEALING, UNSPECIFIED PORTION OF METACARPAL, SUBSEQUENT ENCOUNTER: ICD-10-CM

## 2021-07-30 PROCEDURE — 99213 OFFICE O/P EST LOW 20 MIN: CPT | Performed by: PHYSICIAN ASSISTANT

## 2021-07-30 NOTE — PROGRESS NOTES
"Chief Complaint  Pain of the Left Shoulder    Subjective          Amanda Cason is a 55 y.o. female  presents to [facility] for   History of Present Illness    Patient presents for follow-up evaluation of left shoulder rotator cuff repair, 12/14/2020, right fifth metacarpal fracture and to review MRI of her left humerus.  Patient states she has been doing home exercises she states range of motion has improved for her shoulder she states she still has some pain in her arm she points to her upper arm as her area of pain she states when she bumps it against something it may cause some pain.  Patient states she still has stiffness in her hand with some pain and mild swelling.  She denies numbness and tingling to the hand.      No Known Allergies     Social History     Socioeconomic History   • Marital status:      Spouse name: Not on file   • Number of children: Not on file   • Years of education: Not on file   • Highest education level: Not on file   Tobacco Use   • Smoking status: Never Smoker   • Smokeless tobacco: Never Used   Vaping Use   • Vaping Use: Never used   Substance and Sexual Activity   • Alcohol use: Never   • Drug use: Never        REVIEW OF SYSTEMS    Constitutional: Denies fevers, chills, weight loss  Cardiovascular: Denies chest pain, shortness of breath  Skin: Denies rashes, acute skin changes  Neurologic: Denies headache, loss of consciousness  MSK: Left shoulder pain, left hand pain      Objective   Vital Signs:   Pulse 74   Ht 162.6 cm (64\")   Wt 70.1 kg (154 lb 9.6 oz)   SpO2 98%   BMI 26.54 kg/m²     Body mass index is 26.54 kg/m².    Physical Exam    Left shoulder: Incisions are well-healed, no erythema, ecchymosis, no swelling, no signs of infection, active forward elevation 160, active abduction 110 external rotation with abduction 70, internal rotation to her buttock, 5 out of 5 strength, neurovascularly intact.    Left hand: Mild swelling to the " fingers and thumb, mild tenderness to palpation to the fingers and thumb, limited flexion extension of fingers and thumb.  Neurovascular intact, patient unable to make a full fist due to thumb and finger range of motion limitation.  2+ capillary refill, 2+ radial ulnar pulses, pain with range of motion of thumb and fingers, negative grind test, negative snuffbox tenderness, wrist range of motion intact, mild pain with wrist range of motion.      Right hand: Nontender to palpation at fracture site, patient able to make a full fist, 5 out of 5  strength, neurovascular intact, 2+ capillary refill.      Procedures    Imaging Results (Most Recent)     None           Result Review :   The following data was reviewed by: RAY Escamilla on 07/30/2021:          MRI Humerus Left Without Contrast    Result Date: 7/1/2021  Narrative: PROCEDURE: MRI HUMERUS LEFT WO CONTRAST  COMPARISON: [facility], MR, MRI LEFT SHOULDER WO CONTRAST, 11/09/2020, 7:25.  [facility] , CR, SCAPULA LT, 10/23/2020, 9:37.  [facility], CR, SHOULDER >OR= 2V LT, 9/10/2020, 15:23.  [facility], MR, MRI LEFT SHOULDER WO CONTRAST, 4/19/2021, 9:12.  INDICATIONS: MID TO DISTAL LEFT HUMERUS PAIN SINCE HAVING LEFT SHOULDER SURGERY IN DECEMBER 2020  TECHNIQUE: Multiplanar multisequence images of the mid and distal left humerus without contrast.   FINDINGS:  Motion artifact is present.  Marrow signal intensity is normal.  No fractures are identified.  There are no acute osseous lesions.  No soft tissue masses are identified.  There are no abnormal fluid collections.  No evidence of myoedema or muscular atrophy.  IMPRESSION: Normal exam.  KARENA HERRMANN MD       Electronically Signed and Approved By: KARENA HERRMANN MD on 7/01/2021 at 8:33                Assessment and Plan    Diagnoses and all orders for this visit:    1. Aftercare following surgery of left shoulder arthroscopic  subacromial decompression, mini open rotator cuff repair, 12/14/2020 (Primary)    2. Closed nondisplaced fracture of fifth metacarpal bone of right hand with routine healing, unspecified portion of metacarpal, subsequent encounter    3. Right hand pain    4. Left hand pain  -     Ambulatory Referral to Hand Surgery    5. Stiffness of left hand joint  -     Ambulatory Referral to Hand Surgery        Reviewed MRI with the patient, advised her of normal findings, we discussed continued home exercises for the left shoulder, continue activity as tolerated, follow-up in 3 months for the left shoulder.  She was given referral to hand specialist in [place] for the left hand, she states they helped her out with her right hand when she had similar issue in the past.  Follow-up in 3 months.    Call or return if worsening symptoms.    Follow Up   Return in about 3 months (around 10/30/2021) for Recheck.  Patient was given instructions and counseling regarding her condition or for health maintenance advice. Please see specific information pulled into the AVS if appropriate.

## 2021-08-03 RX ORDER — MULTIPLE VITAMINS W/ MINERALS TAB 9MG-400MCG
1 TAB ORAL 2 TIMES DAILY
COMMUNITY
End: 2021-11-22

## 2021-08-03 RX ORDER — NEOMYCIN/POLYMYXIN B/PRAMOXINE 3.5-10K-1
500 CREAM (GRAM) TOPICAL DAILY
COMMUNITY

## 2021-08-03 RX ORDER — MULTIVIT WITH MINERALS/LUTEIN
1000 TABLET ORAL DAILY
COMMUNITY

## 2021-08-05 ENCOUNTER — TELEPHONE (OUTPATIENT)
Dept: SURGERY | Facility: CLINIC | Age: 56
End: 2021-08-05

## 2021-08-06 DIAGNOSIS — Z12.11 SCREENING FOR COLON CANCER: Primary | ICD-10-CM

## 2021-08-06 RX ORDER — SODIUM PICOSULFATE, MAGNESIUM OXIDE, AND ANHYDROUS CITRIC ACID 10; 3.5; 12 MG/160ML; G/160ML; G/160ML
1 LIQUID ORAL SEE ADMIN INSTRUCTIONS
Qty: 160 ML | Refills: 0 | Status: SHIPPED | OUTPATIENT
Start: 2021-08-06 | End: 2021-11-04 | Stop reason: HOSPADM

## 2021-08-10 RX ORDER — ROSUVASTATIN CALCIUM 10 MG/1
TABLET, COATED ORAL
Qty: 90 TABLET | Refills: 1 | Status: SHIPPED | OUTPATIENT
Start: 2021-08-10 | End: 2022-01-11

## 2021-10-07 ENCOUNTER — TELEPHONE (OUTPATIENT)
Dept: SURGERY | Facility: CLINIC | Age: 56
End: 2021-10-07

## 2021-10-18 RX ORDER — LISINOPRIL 2.5 MG/1
TABLET ORAL
Qty: 90 TABLET | Refills: 1 | Status: SHIPPED | OUTPATIENT
Start: 2021-10-18 | End: 2022-04-14

## 2021-10-18 RX ORDER — DAPAGLIFLOZIN AND METFORMIN HYDROCHLORIDE 10; 1000 MG/1; MG/1
TABLET, FILM COATED, EXTENDED RELEASE ORAL
Qty: 90 TABLET | Refills: 1 | Status: SHIPPED | OUTPATIENT
Start: 2021-10-18 | End: 2022-04-14

## 2021-11-03 ENCOUNTER — TELEPHONE (OUTPATIENT)
Dept: SURGERY | Facility: CLINIC | Age: 56
End: 2021-11-03

## 2021-11-03 NOTE — TELEPHONE ENCOUNTER
Caller: Amanda Cason    Relationship: Self    Best call back number: [phone]    What is the best time to reach you: ANYTIME    Who are you requesting to speak with (clinical staff, provider,  specific staff member): CLINICAL        What was the call regarding: PT HAS TAKEN HER DIABETIC PILL TODAY AND WANTS TO KNOW IF THIS IS OK BECAUSE SHE HAS HER COLONOSCOPY PROCEDURE TOMORROW    Do you require a callback: YES

## 2021-11-03 NOTE — TELEPHONE ENCOUNTER
Talked with patient. She does monitor her sugar and she will not take another pill today or in the morning. She knows that she can have a clear liquid with sugar if her sugar drops.

## 2021-11-04 ENCOUNTER — ANESTHESIA (OUTPATIENT)
Dept: GASTROENTEROLOGY | Facility: HOSPITAL | Age: 56
End: 2021-11-04

## 2021-11-04 ENCOUNTER — HOSPITAL ENCOUNTER (OUTPATIENT)
Facility: HOSPITAL | Age: 56
Setting detail: HOSPITAL OUTPATIENT SURGERY
Discharge: HOME | End: 2021-11-04
Attending: SURGERY | Admitting: SURGERY

## 2021-11-04 ENCOUNTER — ANESTHESIA EVENT (OUTPATIENT)
Dept: GASTROENTEROLOGY | Facility: HOSPITAL | Age: 56
End: 2021-11-04

## 2021-11-04 VITALS
OXYGEN SATURATION: 99 % | BODY MASS INDEX: 26.34 KG/M2 | HEART RATE: 59 BPM | TEMPERATURE: 97.9 F | WEIGHT: 153.44 LBS | DIASTOLIC BLOOD PRESSURE: 72 MMHG | SYSTOLIC BLOOD PRESSURE: 120 MMHG | RESPIRATION RATE: 15 BRPM

## 2021-11-04 DIAGNOSIS — Z12.11 SCREENING FOR MALIGNANT NEOPLASM OF COLON: ICD-10-CM

## 2021-11-04 LAB — GLUCOSE BLDC GLUCOMTR-MCNC: 131 MG/DL (ref 70–99)

## 2021-11-04 PROCEDURE — 88305 TISSUE EXAM BY PATHOLOGIST: CPT | Performed by: SURGERY

## 2021-11-04 PROCEDURE — 25010000002 PROPOFOL 10 MG/ML EMULSION: Performed by: NURSE ANESTHETIST, CERTIFIED REGISTERED

## 2021-11-04 PROCEDURE — 82962 GLUCOSE BLOOD TEST: CPT

## 2021-11-04 RX ORDER — SODIUM CHLORIDE 0.9 % (FLUSH) 0.9 %
3 SYRINGE (ML) INJECTION EVERY 12 HOURS SCHEDULED
Status: DISCONTINUED | OUTPATIENT
Start: 2021-11-04 | End: 2021-11-04 | Stop reason: HOSPADM

## 2021-11-04 RX ORDER — SODIUM CHLORIDE 0.9 % (FLUSH) 0.9 %
10 SYRINGE (ML) INJECTION AS NEEDED
Status: DISCONTINUED | OUTPATIENT
Start: 2021-11-04 | End: 2021-11-04 | Stop reason: HOSPADM

## 2021-11-04 RX ORDER — SODIUM CHLORIDE, SODIUM LACTATE, POTASSIUM CHLORIDE, CALCIUM CHLORIDE 600; 310; 30; 20 MG/100ML; MG/100ML; MG/100ML; MG/100ML
30 INJECTION, SOLUTION INTRAVENOUS CONTINUOUS
Status: DISCONTINUED | OUTPATIENT
Start: 2021-11-04 | End: 2021-11-04 | Stop reason: HOSPADM

## 2021-11-04 RX ORDER — PROPOFOL 10 MG/ML
VIAL (ML) INTRAVENOUS AS NEEDED
Status: DISCONTINUED | OUTPATIENT
Start: 2021-11-04 | End: 2021-11-04 | Stop reason: SURG

## 2021-11-04 RX ADMIN — PROPOFOL 100 MG: 10 INJECTION, EMULSION INTRAVENOUS at 10:29

## 2021-11-04 RX ADMIN — SODIUM CHLORIDE, POTASSIUM CHLORIDE, SODIUM LACTATE AND CALCIUM CHLORIDE: 600; 310; 30; 20 INJECTION, SOLUTION INTRAVENOUS at 10:27

## 2021-11-04 RX ADMIN — PROPOFOL 200 MCG/KG/MIN: 10 INJECTION, EMULSION INTRAVENOUS at 10:29

## 2021-11-04 NOTE — ANESTHESIA POSTPROCEDURE EVALUATION
Patient: Amanda Cason    Procedure Summary     Date: 11/04/21 Room / Location: [facility] ENDOSCOPY 1 / [facility] ENDOSCOPY    Anesthesia Start: 1026 Anesthesia Stop: 1050    Procedure: COLONOSCOPY with polypectomy. (N/A ) Diagnosis:       Screening for malignant neoplasm of colon      (Screening for malignant neoplasm of colon [Z12.11])    Surgeons: Silvano Garcia MD Provider: Felix Archuleta MD    Anesthesia Type: general ASA Status: 2          Anesthesia Type: general    Vitals  Vitals Value Taken Time   /73 11/04/21 1101   Temp 36.6 °C (97.8 °F) 11/04/21 1050   Pulse 67 11/04/21 1104   Resp 14 11/04/21 1100   SpO2 98 % 11/04/21 1103   Vitals shown include unvalidated device data.        Post Anesthesia Care and Evaluation    Patient location during evaluation: bedside  Patient participation: complete - patient participated  Level of consciousness: awake  Pain score: 0  Pain management: adequate  Airway patency: patent  Anesthetic complications: No anesthetic complications  PONV Status: none  Cardiovascular status: acceptable and stable  Respiratory status: acceptable and room air  Hydration status: acceptable    Comments: An Anesthesiologist personally participated in the most demanding procedures (including induction and emergence if applicable) in the anesthesia plan, monitored the course of anesthesia administration at frequent intervals and remained physically present and available for immediate diagnosis and treatment of emergencies.

## 2021-11-04 NOTE — ANESTHESIA PREPROCEDURE EVALUATION
Anesthesia Evaluation     Patient summary reviewed and Nursing notes reviewed   no history of anesthetic complications:  NPO Solid Status: > 8 hours  NPO Liquid Status: > 2 hours           Airway   Mallampati: II  TM distance: >3 FB  Neck ROM: full  No difficulty expected  Dental      Pulmonary - negative pulmonary ROS and normal exam    breath sounds clear to auscultation  Cardiovascular - negative cardio ROS and normal exam  Exercise tolerance: excellent (>7 METS)    Rhythm: regular        Neuro/Psych- negative ROS  GI/Hepatic/Renal/Endo    (+)  GERD,  diabetes mellitus,     Musculoskeletal     Abdominal    Substance History - negative use     OB/GYN negative ob/gyn ROS         Other                        Anesthesia Plan    ASA 2     general   total IV anesthesia    Anesthetic plan, all risks, benefits, and alternatives have been provided, discussed and informed consent has been obtained with: patient.

## 2021-11-04 NOTE — H&P
General Surgery/Colorectal Surgery Note    Patient Name:  Amanda Cason  YOB: 1965  [id number]    Referring Provider: Silvano Garcia, *      Patient Care Team:  Birdie Damian APRN as PCP - General (Nurse Practitioner)    Subjective .     History of present illness:    HPI   Patient is a 55-year-old white/ female that presents to the general surgery office for colonoscopy consultation.     Patient denies any abdominal pain, change in bowel habit, rectal bleeding.     Unsure of her family history.  Patient has lived in and out of foster homes most of her life.     No previous colonoscopy.     Patient has no known drug allergies.     History:  Past Medical History:   Diagnosis Date   • Acid reflux    • Diabetes (CMS/[facility])    • Diabetes mellitus type 2, noninsulin dependent (CMS/[facility])    • Diabetes mellitus, type 2 (CMS/[facility]) 02/12/2018   • Diabetic eye exam (CMS/[facility]) 06/23/2015    Last completed-Saulo Siu   • Dry eye syndrome    • Encounter for diabetic foot exam (CMS/[facility]) 11/20/2019    last completed- Done in office durning visit   • GERD (gastroesophageal reflux disease) 02/12/2018   • Glaucoma    • Pap smear for cervical cancer screening 08/30/2017   • Vaginal Pap smear 08/20/2017    For cancer screening   • Visit for screening mammogram 11/20/2019    Declined       Past Surgical History:   Procedure Laterality Date   • APPENDECTOMY      Childhood   • OTHER SURGICAL HISTORY      Ear Surgery  - x3 hole in ear   • TUBAL ABDOMINAL LIGATION  1986       Family History   Problem Relation Age of Onset   • Diabetes Mother         mellitus   • Breast cancer Mother    • Diabetes type II Mother         Mellitus   • Heart disease Father    • Diabetes Sister         Mellitus   • Diabetes Brother         Mellitus   • Malig Hyperthermia Neg Hx        Social History     Tobacco Use   • Smoking status: Never Smoker   • Smokeless tobacco: Never Used   Vaping Use   • Vaping Use: Never used    Substance Use Topics   • Alcohol use: Not Currently   • Drug use: Never       Review of Systems    Review of Systems see hpi           Medications Prior to Admission   Medication Sig Dispense Refill Last Dose   • ascorbic acid (VITAMIN C) 1000 MG tablet Take 1,000 mg by mouth Daily.      • multivitamin with minerals (OCUVITE PRESERVISION PO) Take 1 tablet by mouth 2 (two) times a day.      • Omega-3 Krill Oil 500 MG capsule Take  by mouth.      • lisinopril (PRINIVIL,ZESTRIL) 2.5 MG tablet TAKE 1 TABLET BY MOUTH EVERY DAY 90 tablet 1    • rosuvastatin (CRESTOR) 10 MG tablet TAKE 1 TABLET BY MOUTH EVERYDAY AT BEDTIME 90 tablet 1    • Sod Picosulfate-Mag Ox-Cit Acd (Clenpiq) 10-3.5-12 MG-GM -GM/160ML solution Take 1 container by mouth See Admin Instructions. Please consume prep as directed by manufacturers instructions.  Indications: Colonoscopy 160 mL 0    • Xigduo XR  MG tablet TAKE 1 TABLET BY ORAL ROUTE ONCE DAILY IN THE MORNING WITH FOOD FOR 90 DAYS 90 tablet 1          Home Meds:      Prior to Admission medications    Medication Sig Start Date End Date Taking? Authorizing Provider   ascorbic acid (VITAMIN C) 1000 MG tablet Take 1,000 mg by mouth Daily.   Yes ProviderEllen MD   multivitamin with minerals (OCUVITE PRESERVISION PO) Take 1 tablet by mouth 2 (two) times a day.   Yes ProviderEllen MD   Omega-3 Krill Oil 500 MG capsule Take  by mouth.   Yes ProviderEllen MD   lisinopril (PRINIVIL,ZESTRIL) 2.5 MG tablet TAKE 1 TABLET BY MOUTH EVERY DAY 10/18/21   Birdie Damian APRN   rosuvastatin (CRESTOR) 10 MG tablet TAKE 1 TABLET BY MOUTH EVERYDAY AT BEDTIME 8/10/21   Birdie Damian APRN   Sod Picosulfate-Mag Ox-Cit Acd (Clenpiq) 10-3.5-12 MG-GM -GM/160ML solution Take 1 container by mouth See Admin Instructions. Please consume prep as directed by manufacturers instructions.  Indications: Colonoscopy 8/6/21   Sivlano Garcia MD   Xigduo XR  MG tablet TAKE 1 TABLET BY  ORAL ROUTE ONCE DAILY IN THE MORNING WITH FOOD FOR 90 DAYS 10/18/21   Birdie Damian APRN            Allergies:  Patient has no known allergies.      Objective     Vital Signs        Physical Exam:     Physical Exam    NAD, A/O x 4, normal circulation, normal respiration      Result Review :     Assessment/Plan     There are no diagnoses linked to this encounter.       Risks including bleeding, perforation, pain, infection, benefits, and alternatives of Colonoscopy discussed with patient.  All questions answered.  Consent verified.         Silvnao Garcia MD  11/04/21 09:12 EDT

## 2021-11-05 ENCOUNTER — OFFICE VISIT (OUTPATIENT)
Dept: ORTHOPEDIC SURGERY | Facility: CLINIC | Age: 56
End: 2021-11-05

## 2021-11-05 VITALS — OXYGEN SATURATION: 95 % | HEIGHT: 64 IN | HEART RATE: 67 BPM | BODY MASS INDEX: 26.46 KG/M2 | WEIGHT: 155 LBS

## 2021-11-05 DIAGNOSIS — M25.642 STIFFNESS OF LEFT HAND JOINT: ICD-10-CM

## 2021-11-05 DIAGNOSIS — Z47.89 AFTERCARE FOLLOWING SURGERY OF THE MUSCULOSKELETAL SYSTEM: Primary | ICD-10-CM

## 2021-11-05 PROCEDURE — 99213 OFFICE O/P EST LOW 20 MIN: CPT | Performed by: PHYSICIAN ASSISTANT

## 2021-11-05 NOTE — PROGRESS NOTES
"Chief Complaint  Follow-up and Pain of the Left Shoulder and Follow-up and Pain of the Right Hand    Subjective          Amanda Cason is a 55 y.o. female  presents to [facility] for   History of Present Illness    Patient presents for follow-up evaluation of left hand pain and stiffness, left shoulder arthroscopic subacromial decompression, mini open rotator cuff repair, 12/14/2021.  Patient states that she is continue to work on range of motion and strength for her shoulder and her hand, patient states she went to [facility] for her hand she states she went to 2 visits with them they recommended physical therapy patient states she refused physical therapy she says she was told by the doctor that was seeing her that she should not come back they were angry at her for consulting her friend about her hand condition.  Patient states she has improved with hand movement of her left hand she states it is still stiff and some movements specifically flexion.  Patient states she continues to work on range of motion of her shoulder she states she occasionally has pain in the anterior shoulder with certain movements.  Patient denies new injury, denies new symptoms of pain.  No Known Allergies     Social History     Socioeconomic History   • Marital status:    Tobacco Use   • Smoking status: Never Smoker   • Smokeless tobacco: Never Used   Vaping Use   • Vaping Use: Never used   Substance and Sexual Activity   • Alcohol use: Not Currently   • Drug use: Never        REVIEW OF SYSTEMS    Constitutional: Denies fevers, chills, weight loss  Cardiovascular: Denies chest pain, shortness of breath  Skin: Denies rashes, acute skin changes  Neurologic: Denies headache, loss of consciousness  MSK: Left shoulder pain, left hand pain      Objective   Vital Signs:   Pulse 67   Ht 162.6 cm (64\")   Wt 70.3 kg (155 lb)   SpO2 95%   BMI 26.61 kg/m²     Body mass index is 26.61 kg/m².    Physical " Exam    Left shoulder: Incisions are well-healed, no erythema, no ecchymosis, no swelling, no atrophy, no signs of infection, active forward elevation 175, active abduction 120, external rotation with abduction 80, internal rotation to T12, 5 out of 5 rotator cuff strength, neurovascularly intact.    Left hand: Full extension of fingers and thumb, limited flexion of fingers and thumb secondary to stiffness, sensation intact to light touch, 2+ capillary refill.    Procedures    Imaging Results (Most Recent)     None           Result Review :   The following data was reviewed by: RAY Escamilla on 11/05/2021:               Assessment and Plan    Diagnoses and all orders for this visit:    1. Aftercare following surgery of left shoulder arthroscopic subacromial decompression, mini open rotator cuff repair, 12/14/2020 (Primary)    2. Stiffness of left hand joint        Advised patient we recommend calling back [facility] to asked to be seen by different physician, patient states she will think about this.  She was advised to continue activity as tolerated for her shoulder, follow-up as needed, patient agreed.    Call or return if worsening symptoms.    Follow Up   Return if symptoms worsen or fail to improve.  Patient was given instructions and counseling regarding her condition or for health maintenance advice. Please see specific information pulled into the AVS if appropriate.

## 2021-11-08 LAB
CYTO UR: NORMAL
LAB AP CASE REPORT: NORMAL
LAB AP CLINICAL INFORMATION: NORMAL
PATH REPORT.FINAL DX SPEC: NORMAL
PATH REPORT.GROSS SPEC: NORMAL

## 2021-11-09 ENCOUNTER — TELEPHONE (OUTPATIENT)
Dept: ORTHOPEDIC SURGERY | Facility: CLINIC | Age: 56
End: 2021-11-09

## 2021-11-09 NOTE — TELEPHONE ENCOUNTER
Caller: MONICA    Relationship: [employer]ANNA[employer] - IN CHARGE OF DISABILITY CLAIM    Best call back number: [phone]    What form or medical record are you requesting: DISABILITY FORM FROM 11/5 VISIT    Who is requesting this form or medical record from you: HANNAH IVEY    How would you like to receive the form or medical records (pick-up, mail, fax): FAX  If fax, what is the fax number: [phone]    Timeframe paperwork needed: RETURN WITH OFFICE NOTES ASAP    Additional notes: PATIENT WAS SUPPOSED TO BRING THE FORM WITH HER TO HER APPT

## 2021-11-11 ENCOUNTER — TRANSCRIBE ORDERS (OUTPATIENT)
Dept: ADMINISTRATIVE | Facility: HOSPITAL | Age: 56
End: 2021-11-11

## 2021-11-11 ENCOUNTER — LAB (OUTPATIENT)
Dept: LAB | Facility: HOSPITAL | Age: 56
End: 2021-11-11

## 2021-11-11 DIAGNOSIS — M79.641 BILATERAL HAND PAIN: ICD-10-CM

## 2021-11-11 DIAGNOSIS — M79.642 BILATERAL HAND PAIN: ICD-10-CM

## 2021-11-11 DIAGNOSIS — M79.641 BILATERAL HAND PAIN: Primary | ICD-10-CM

## 2021-11-11 DIAGNOSIS — M79.642 BILATERAL HAND PAIN: Primary | ICD-10-CM

## 2021-11-11 LAB
ALBUMIN SERPL-MCNC: 4.6 G/DL (ref 3.5–5.2)
ALBUMIN/GLOB SERPL: 1.9 G/DL
ALP SERPL-CCNC: 82 U/L (ref 39–117)
ALT SERPL W P-5'-P-CCNC: 18 U/L (ref 1–33)
ANION GAP SERPL CALCULATED.3IONS-SCNC: 12.4 MMOL/L (ref 5–15)
AST SERPL-CCNC: 16 U/L (ref 1–32)
BASOPHILS # BLD AUTO: 0.04 10*3/MM3 (ref 0–0.2)
BASOPHILS NFR BLD AUTO: 0.5 % (ref 0–1.5)
BILIRUB SERPL-MCNC: 0.2 MG/DL (ref 0–1.2)
BUN SERPL-MCNC: 15 MG/DL (ref 6–20)
BUN/CREAT SERPL: 25.4 (ref 7–25)
CALCIUM SPEC-SCNC: 9.3 MG/DL (ref 8.6–10.5)
CHLORIDE SERPL-SCNC: 104 MMOL/L (ref 98–107)
CHROMATIN AB SERPL-ACNC: <10 IU/ML (ref 0–14)
CK SERPL-CCNC: 59 U/L (ref 20–180)
CO2 SERPL-SCNC: 23.6 MMOL/L (ref 22–29)
CREAT SERPL-MCNC: 0.59 MG/DL (ref 0.57–1)
CRP SERPL-MCNC: 0.31 MG/DL (ref 0–0.5)
DEPRECATED RDW RBC AUTO: 41.9 FL (ref 37–54)
EOSINOPHIL # BLD AUTO: 0.1 10*3/MM3 (ref 0–0.4)
EOSINOPHIL NFR BLD AUTO: 1.3 % (ref 0.3–6.2)
ERYTHROCYTE [DISTWIDTH] IN BLOOD BY AUTOMATED COUNT: 12.7 % (ref 12.3–15.4)
ERYTHROCYTE [SEDIMENTATION RATE] IN BLOOD: 12 MM/HR (ref 0–30)
GFR SERPL CREATININE-BSD FRML MDRD: 106 ML/MIN/1.73
GLOBULIN UR ELPH-MCNC: 2.4 GM/DL
GLUCOSE SERPL-MCNC: 124 MG/DL (ref 65–99)
HCT VFR BLD AUTO: 42.5 % (ref 34–46.6)
HGB BLD-MCNC: 14.5 G/DL (ref 12–15.9)
IMM GRANULOCYTES # BLD AUTO: 0.03 10*3/MM3 (ref 0–0.05)
IMM GRANULOCYTES NFR BLD AUTO: 0.4 % (ref 0–0.5)
LYMPHOCYTES # BLD AUTO: 2.18 10*3/MM3 (ref 0.7–3.1)
LYMPHOCYTES NFR BLD AUTO: 27.9 % (ref 19.6–45.3)
MCH RBC QN AUTO: 30.6 PG (ref 26.6–33)
MCHC RBC AUTO-ENTMCNC: 34.1 G/DL (ref 31.5–35.7)
MCV RBC AUTO: 89.7 FL (ref 79–97)
MONOCYTES # BLD AUTO: 0.41 10*3/MM3 (ref 0.1–0.9)
MONOCYTES NFR BLD AUTO: 5.2 % (ref 5–12)
NEUTROPHILS NFR BLD AUTO: 5.05 10*3/MM3 (ref 1.7–7)
NEUTROPHILS NFR BLD AUTO: 64.7 % (ref 42.7–76)
NRBC BLD AUTO-RTO: 0 /100 WBC (ref 0–0.2)
PLATELET # BLD AUTO: 234 10*3/MM3 (ref 140–450)
PMV BLD AUTO: 9.4 FL (ref 6–12)
POTASSIUM SERPL-SCNC: 4 MMOL/L (ref 3.5–5.2)
PROT SERPL-MCNC: 7 G/DL (ref 6–8.5)
RBC # BLD AUTO: 4.74 10*6/MM3 (ref 3.77–5.28)
SODIUM SERPL-SCNC: 140 MMOL/L (ref 136–145)
URATE SERPL-MCNC: 4.2 MG/DL (ref 2.4–5.7)
WBC # BLD AUTO: 7.81 10*3/MM3 (ref 3.4–10.8)

## 2021-11-11 PROCEDURE — 36415 COLL VENOUS BLD VENIPUNCTURE: CPT

## 2021-11-11 PROCEDURE — 84550 ASSAY OF BLOOD/URIC ACID: CPT

## 2021-11-11 PROCEDURE — 86140 C-REACTIVE PROTEIN: CPT

## 2021-11-11 PROCEDURE — 86200 CCP ANTIBODY: CPT

## 2021-11-11 PROCEDURE — 82550 ASSAY OF CK (CPK): CPT

## 2021-11-11 PROCEDURE — 85025 COMPLETE CBC W/AUTO DIFF WBC: CPT

## 2021-11-11 PROCEDURE — 86431 RHEUMATOID FACTOR QUANT: CPT

## 2021-11-11 PROCEDURE — 86256 FLUORESCENT ANTIBODY TITER: CPT

## 2021-11-11 PROCEDURE — 86038 ANTINUCLEAR ANTIBODIES: CPT

## 2021-11-11 PROCEDURE — 81374 HLA I TYPING 1 ANTIGEN LR: CPT

## 2021-11-11 PROCEDURE — 83520 IMMUNOASSAY QUANT NOS NONAB: CPT

## 2021-11-11 PROCEDURE — 80053 COMPREHEN METABOLIC PANEL: CPT

## 2021-11-11 PROCEDURE — 85652 RBC SED RATE AUTOMATED: CPT

## 2021-11-12 LAB — ANA SER QL: NEGATIVE

## 2021-11-13 LAB
C-ANCA TITR SER IF: NORMAL TITER
MYELOPEROXIDASE AB SER IA-ACNC: <9 U/ML (ref 0–9)
P-ANCA ATYPICAL TITR SER IF: NORMAL TITER
P-ANCA TITR SER IF: NORMAL TITER
PROTEINASE3 AB SER IA-ACNC: <3.5 U/ML (ref 0–3.5)

## 2021-11-14 LAB — CCP IGA+IGG SERPL IA-ACNC: 6 UNITS (ref 0–19)

## 2021-11-17 ENCOUNTER — OFFICE VISIT (OUTPATIENT)
Dept: SURGERY | Facility: CLINIC | Age: 56
End: 2021-11-17

## 2021-11-17 VITALS — HEIGHT: 64 IN | WEIGHT: 153.2 LBS | BODY MASS INDEX: 26.15 KG/M2

## 2021-11-17 DIAGNOSIS — K63.5 POLYP OF COLON, UNSPECIFIED PART OF COLON, UNSPECIFIED TYPE: Primary | ICD-10-CM

## 2021-11-17 PROCEDURE — 99212 OFFICE O/P EST SF 10 MIN: CPT | Performed by: SURGERY

## 2021-11-17 NOTE — PROGRESS NOTES
General Surgery/Colorectal Surgery Note    Patient Name:  Amanda Cason  YOB: 1965  [id number]    Referring Provider: Emergency, Triage Luciana*      Patient Care Team:  Birdie Damian APRN as PCP - General (Nurse Practitioner)  Silvano Garcia MD as Consulting Physician (General Surgery)    Chief complaint follow-up endoscopy    Subjective .     History of present illness:    No family history of colorectal cancer.  Status post screening colonoscopy 11/4/2021 with 6 mm polyp removed in the distal transverse colon.  Pathology with hyperplastic polyp    She comes in for follow-up.  No changes since last seen.    History:  Past Medical History:   Diagnosis Date   • Acid reflux    • Diabetes (HCC)    • Diabetes mellitus type 2, noninsulin dependent (HCC)    • Diabetes mellitus, type 2 (HCC) 02/12/2018   • Diabetic eye exam ([facility]) 06/23/2015    Last completed-Saulo Siu   • Dry eye syndrome    • Encounter for diabetic foot exam ([facility]) 11/20/2019    last completed- Done in office durning visit   • GERD (gastroesophageal reflux disease) 02/12/2018   • Glaucoma    • Pap smear for cervical cancer screening 08/30/2017   • Vaginal Pap smear 08/20/2017    For cancer screening   • Visit for screening mammogram 11/20/2019    Declined       Past Surgical History:   Procedure Laterality Date   • APPENDECTOMY      Childhood   • COLONOSCOPY N/A 11/4/2021    Procedure: COLONOSCOPY with polypectomy.;  Surgeon: Silvano Garcia MD;  Location: [facility];  Service: General;  Laterality: N/A;  Transvers polyp   • OTHER SURGICAL HISTORY      Ear Surgery  - x3 hole in ear   • ROTATOR CUFF REPAIR Left    • SHOULDER LIGAMENT REPAIR Right    • TUBAL ABDOMINAL LIGATION  1986       Family History   Problem Relation Age of Onset   • Diabetes Mother         mellitus   • Breast cancer Mother    • Diabetes type II Mother         Mellitus   • Heart disease Father    • Diabetes Sister         Mellitus   •  Diabetes Brother         Mellitus   • Malig Hyperthermia Neg Hx        Social History     Tobacco Use   • Smoking status: Never Smoker   • Smokeless tobacco: Never Used   Vaping Use   • Vaping Use: Never used   Substance Use Topics   • Alcohol use: Not Currently   • Drug use: Never       Review of Systems  All systems were reviewed and negative except for:   Review of Systems   Constitutional: Negative for chills, fever and unexpected weight loss.   HENT: Negative for congestion, nosebleeds and voice change.    Eyes: Negative for blurred vision, double vision and discharge.   Respiratory: Negative for apnea, chest tightness and shortness of breath.    Cardiovascular: Negative for chest pain and leg swelling.   Gastrointestinal:        See HPI   Endocrine: Negative for cold intolerance and heat intolerance.   Genitourinary: Negative for dysuria, hematuria and urgency.   Musculoskeletal: Negative for back pain, joint swelling and neck pain.   Skin: Negative for color change and dry skin.   Neurological: Negative for dizziness and confusion.   Hematological: Negative for adenopathy.   Psychiatric/Behavioral: Negative for agitation and behavioral problems.     MEDS:  Prior to Admission medications    Medication Sig Start Date End Date Taking? Authorizing Provider   ascorbic acid (VITAMIN C) 1000 MG tablet Take 1,000 mg by mouth Daily.   Yes Ellen Quevedo MD   lisinopril (PRINIVIL,ZESTRIL) 2.5 MG tablet TAKE 1 TABLET BY MOUTH EVERY DAY 10/18/21  Yes Birdie Damian APRN   multivitamin with minerals (OCUVITE PRESERVISION PO) Take 1 tablet by mouth 2 (two) times a day.   Yes Ellen Quevedo MD   Omega-3 Krill Oil 500 MG capsule Take  by mouth.   Yes Ellen Quevedo MD   rosuvastatin (CRESTOR) 10 MG tablet TAKE 1 TABLET BY MOUTH EVERYDAY AT BEDTIME 8/10/21  Yes Birdie Damian APRN   Xigduo XR  MG tablet TAKE 1 TABLET BY ORAL ROUTE ONCE DAILY IN THE MORNING WITH FOOD FOR 90 DAYS 10/18/21  Yes  "Birdie Damian, DULCE        Allergies:  Patient has no known allergies.    Objective     Vital Signs        Physical Exam:     General Appearance:    Alert, cooperative, in no acute distress   Head:    Normocephalic, without obvious abnormality, atraumatic   Eyes:          Conjunctivae and sclerae normal, no icterus,     Ears:    Ears appear intact with no abnormalities noted   Throat:   No oral lesions, no thrush, oral mucosa moist   Neck:   No adenopathy, supple, trachea midline, no thyromegaly   Back:     No kyphosis present, no scoliosis present, no skin lesions,      erythema or scars, no tenderness to percussion or                   palpation,   range of motion normal   Lungs:     Clear to auscultation,respirations regular, even and                  unlabored    Heart:    Regular rhythm and normal rate, normal S1 and S2, no            murmur, no gallop, no rub, no click   Chest Wall:    No abnormalities observed   Abdomen:     Normal bowel sounds, no masses, no organomegaly, soft        non-tender, non-distended, no guarding, no rebound                tenderness   Rectal:        Extremities:   Moves all extremities well, no edema, no cyanosis, no             redness   Pulses:   Pulses palpable and equal bilaterally   Skin:   No bleeding, bruising or rash   Lymph nodes:   No palpable adenopathy   Neurologic:   A/o x 4 with no deficits       Results Review:   {Results Review:05740::\"I reviewed the patient's new clinical results.\"    LABS/IMAGING:  Results for orders placed or performed in visit on 11/11/21   Comprehensive Metabolic Panel    Specimen: Blood   Result Value Ref Range    Glucose 124 (H) 65 - 99 mg/dL    BUN 15 6 - 20 mg/dL    Creatinine 0.59 0.57 - 1.00 mg/dL    Sodium 140 136 - 145 mmol/L    Potassium 4.0 3.5 - 5.2 mmol/L    Chloride 104 98 - 107 mmol/L    CO2 23.6 22.0 - 29.0 mmol/L    Calcium 9.3 8.6 - 10.5 mg/dL    Total Protein 7.0 6.0 - 8.5 g/dL    Albumin 4.60 3.50 - 5.20 g/dL    ALT (SGPT) " 18 1 - 33 U/L    AST (SGOT) 16 1 - 32 U/L    Alkaline Phosphatase 82 39 - 117 U/L    Total Bilirubin 0.2 0.0 - 1.2 mg/dL    eGFR Non African Amer 106 >60 mL/min/1.73    Globulin 2.4 gm/dL    A/G Ratio 1.9 g/dL    BUN/Creatinine Ratio 25.4 (H) 7.0 - 25.0    Anion Gap 12.4 5.0 - 15.0 mmol/L   Sedimentation Rate    Specimen: Blood   Result Value Ref Range    Sed Rate 12 0 - 30 mm/hr   JAYDEN    Specimen: Blood   Result Value Ref Range    JAYDEN Direct Negative Negative   CK    Specimen: Blood   Result Value Ref Range    Creatine Kinase 59 20 - 180 U/L   Rheumatoid Factor    Specimen: Blood   Result Value Ref Range    Rheumatoid Factor Quantitative <10.0 0.0 - 14.0 IU/mL   C-reactive Protein    Specimen: Blood   Result Value Ref Range    C-Reactive Protein 0.31 0.00 - 0.50 mg/dL   Uric Acid    Specimen: Blood   Result Value Ref Range    Uric Acid 4.2 2.4 - 5.7 mg/dL   Cyclic Citrul Peptide Antibody, IgG / IgA    Specimen: Blood   Result Value Ref Range    CCP Antibodies IgG/IgA 6 0 - 19 units   ANCA Panel    Specimen: Blood   Result Value Ref Range    Myeloperoxidase Ab <9.0 0.0 - 9.0 U/mL    Antiproteinase 3 (DE-3) <3.5 0.0 - 3.5 U/mL    C-ANCA <1:20 Neg:<1:20 titer    P-ANCA <1:20 Neg:<1:20 titer    Atypical pANCA <1:20 Neg:<1:20 titer   CBC Auto Differential    Specimen: Blood   Result Value Ref Range    WBC 7.81 3.40 - 10.80 10*3/mm3    RBC 4.74 3.77 - 5.28 10*6/mm3    Hemoglobin 14.5 12.0 - 15.9 g/dL    Hematocrit 42.5 34.0 - 46.6 %    MCV 89.7 79.0 - 97.0 fL    MCH 30.6 26.6 - 33.0 pg    MCHC 34.1 31.5 - 35.7 g/dL    RDW 12.7 12.3 - 15.4 %    RDW-SD 41.9 37.0 - 54.0 fl    MPV 9.4 6.0 - 12.0 fL    Platelets 234 140 - 450 10*3/mm3    Neutrophil % 64.7 42.7 - 76.0 %    Lymphocyte % 27.9 19.6 - 45.3 %    Monocyte % 5.2 5.0 - 12.0 %    Eosinophil % 1.3 0.3 - 6.2 %    Basophil % 0.5 0.0 - 1.5 %    Immature Grans % 0.4 0.0 - 0.5 %    Neutrophils, Absolute 5.05 1.70 - 7.00 10*3/mm3    Lymphocytes, Absolute 2.18 0.70 - 3.10  10*3/mm3    Monocytes, Absolute 0.41 0.10 - 0.90 10*3/mm3    Eosinophils, Absolute 0.10 0.00 - 0.40 10*3/mm3    Basophils, Absolute 0.04 0.00 - 0.20 10*3/mm3    Immature Grans, Absolute 0.03 0.00 - 0.05 10*3/mm3    nRBC 0.0 0.0 - 0.2 /100 WBC        Result Review :     Assessment/Plan     Status post screening colonoscopy 11/4/2021 with 6 mm polyp removed in the distal transverse colon.  Pathology with hyperplastic polyp    I reviewed the findings of the colonoscopy with her.  I reviewed the pathology with her.  Next colonoscopy in 5 years.  Follow-up with me as needed.  All questions answered.  She agrees with the plan.  Thank you for the consult.              This document has been electronically signed by Silvano Garcia MD  November 17, 2021 14:44 EST

## 2021-11-18 LAB — HLA-B27 QL NAA+PROBE: NEGATIVE

## 2021-11-22 ENCOUNTER — LAB (OUTPATIENT)
Dept: LAB | Facility: HOSPITAL | Age: 56
End: 2021-11-22

## 2021-11-22 ENCOUNTER — OFFICE VISIT (OUTPATIENT)
Dept: FAMILY MEDICINE CLINIC | Facility: CLINIC | Age: 56
End: 2021-11-22

## 2021-11-22 VITALS
HEIGHT: 64 IN | SYSTOLIC BLOOD PRESSURE: 126 MMHG | DIASTOLIC BLOOD PRESSURE: 67 MMHG | WEIGHT: 154 LBS | BODY MASS INDEX: 26.29 KG/M2 | OXYGEN SATURATION: 99 % | HEART RATE: 62 BPM

## 2021-11-22 DIAGNOSIS — E78.2 MIXED HYPERLIPIDEMIA: ICD-10-CM

## 2021-11-22 DIAGNOSIS — E11.9 TYPE 2 DIABETES MELLITUS WITHOUT COMPLICATION, WITHOUT LONG-TERM CURRENT USE OF INSULIN (HCC): ICD-10-CM

## 2021-11-22 DIAGNOSIS — I10 ESSENTIAL HYPERTENSION: Primary | ICD-10-CM

## 2021-11-22 DIAGNOSIS — I10 ESSENTIAL HYPERTENSION: ICD-10-CM

## 2021-11-22 PROBLEM — E78.5 HYPERLIPIDEMIA: Status: ACTIVE | Noted: 2021-11-22

## 2021-11-22 PROBLEM — H04.129 DRY EYE SYNDROME: Status: ACTIVE | Noted: 2021-11-22

## 2021-11-22 PROBLEM — K21.9 GERD (GASTROESOPHAGEAL REFLUX DISEASE): Status: ACTIVE | Noted: 2018-02-12

## 2021-11-22 PROBLEM — H40.9 GLAUCOMA: Status: ACTIVE | Noted: 2021-11-22

## 2021-11-22 LAB
ALBUMIN SERPL-MCNC: 4.2 G/DL (ref 3.5–5.2)
ALBUMIN UR-MCNC: <1.2 MG/DL
ALBUMIN/GLOB SERPL: 1.6 G/DL
ALP SERPL-CCNC: 84 U/L (ref 39–117)
ALT SERPL W P-5'-P-CCNC: 15 U/L (ref 1–33)
ANION GAP SERPL CALCULATED.3IONS-SCNC: 13 MMOL/L (ref 5–15)
AST SERPL-CCNC: 11 U/L (ref 1–32)
BASOPHILS # BLD AUTO: 0.04 10*3/MM3 (ref 0–0.2)
BASOPHILS NFR BLD AUTO: 0.5 % (ref 0–1.5)
BILIRUB SERPL-MCNC: 0.3 MG/DL (ref 0–1.2)
BUN SERPL-MCNC: 10 MG/DL (ref 6–20)
BUN/CREAT SERPL: 12 (ref 7–25)
CALCIUM SPEC-SCNC: 9.2 MG/DL (ref 8.6–10.5)
CHLORIDE SERPL-SCNC: 104 MMOL/L (ref 98–107)
CHOLEST SERPL-MCNC: 185 MG/DL (ref 0–200)
CO2 SERPL-SCNC: 24 MMOL/L (ref 22–29)
CREAT SERPL-MCNC: 0.83 MG/DL (ref 0.57–1)
DEPRECATED RDW RBC AUTO: 40.3 FL (ref 37–54)
EOSINOPHIL # BLD AUTO: 0.1 10*3/MM3 (ref 0–0.4)
EOSINOPHIL NFR BLD AUTO: 1.4 % (ref 0.3–6.2)
ERYTHROCYTE [DISTWIDTH] IN BLOOD BY AUTOMATED COUNT: 12.2 % (ref 12.3–15.4)
GFR SERPL CREATININE-BSD FRML MDRD: 71 ML/MIN/1.73
GLOBULIN UR ELPH-MCNC: 2.7 GM/DL
GLUCOSE SERPL-MCNC: 137 MG/DL (ref 65–99)
HBA1C MFR BLD: 8.91 % (ref 4.8–5.6)
HCT VFR BLD AUTO: 43.9 % (ref 34–46.6)
HDLC SERPL-MCNC: 51 MG/DL (ref 40–60)
HGB BLD-MCNC: 14.7 G/DL (ref 12–15.9)
IMM GRANULOCYTES # BLD AUTO: 0.03 10*3/MM3 (ref 0–0.05)
IMM GRANULOCYTES NFR BLD AUTO: 0.4 % (ref 0–0.5)
LDLC SERPL CALC-MCNC: 111 MG/DL (ref 0–100)
LDLC/HDLC SERPL: 2.11 {RATIO}
LYMPHOCYTES # BLD AUTO: 2.05 10*3/MM3 (ref 0.7–3.1)
LYMPHOCYTES NFR BLD AUTO: 27.8 % (ref 19.6–45.3)
MCH RBC QN AUTO: 29.9 PG (ref 26.6–33)
MCHC RBC AUTO-ENTMCNC: 33.5 G/DL (ref 31.5–35.7)
MCV RBC AUTO: 89.2 FL (ref 79–97)
MONOCYTES # BLD AUTO: 0.46 10*3/MM3 (ref 0.1–0.9)
MONOCYTES NFR BLD AUTO: 6.2 % (ref 5–12)
NEUTROPHILS NFR BLD AUTO: 4.7 10*3/MM3 (ref 1.7–7)
NEUTROPHILS NFR BLD AUTO: 63.7 % (ref 42.7–76)
NRBC BLD AUTO-RTO: 0 /100 WBC (ref 0–0.2)
PLATELET # BLD AUTO: 259 10*3/MM3 (ref 140–450)
PMV BLD AUTO: 10.3 FL (ref 6–12)
POTASSIUM SERPL-SCNC: 4.3 MMOL/L (ref 3.5–5.2)
PROT SERPL-MCNC: 6.9 G/DL (ref 6–8.5)
RBC # BLD AUTO: 4.92 10*6/MM3 (ref 3.77–5.28)
SODIUM SERPL-SCNC: 141 MMOL/L (ref 136–145)
T4 FREE SERPL-MCNC: 1.24 NG/DL (ref 0.93–1.7)
TRIGL SERPL-MCNC: 132 MG/DL (ref 0–150)
TSH SERPL DL<=0.05 MIU/L-ACNC: 1.01 UIU/ML (ref 0.27–4.2)
VLDLC SERPL-MCNC: 23 MG/DL (ref 5–40)
WBC NRBC COR # BLD: 7.38 10*3/MM3 (ref 3.4–10.8)

## 2021-11-22 PROCEDURE — 80053 COMPREHEN METABOLIC PANEL: CPT

## 2021-11-22 PROCEDURE — 83036 HEMOGLOBIN GLYCOSYLATED A1C: CPT

## 2021-11-22 PROCEDURE — 82043 UR ALBUMIN QUANTITATIVE: CPT

## 2021-11-22 PROCEDURE — 99214 OFFICE O/P EST MOD 30 MIN: CPT | Performed by: NURSE PRACTITIONER

## 2021-11-22 PROCEDURE — 84439 ASSAY OF FREE THYROXINE: CPT

## 2021-11-22 PROCEDURE — 36415 COLL VENOUS BLD VENIPUNCTURE: CPT

## 2021-11-22 PROCEDURE — 85025 COMPLETE CBC W/AUTO DIFF WBC: CPT

## 2021-11-22 PROCEDURE — 84443 ASSAY THYROID STIM HORMONE: CPT

## 2021-11-22 PROCEDURE — 80061 LIPID PANEL: CPT

## 2021-11-22 RX ORDER — METHYLPREDNISOLONE 4 MG/1
TABLET ORAL
COMMUNITY
Start: 2021-11-12 | End: 2021-11-22

## 2021-11-22 NOTE — PROGRESS NOTES
Chief Complaint  Diabetes, HTN, Hyperlipidemia    Subjective            Amanda Cason presents to [facility]  Pt is here for a 3 mo f/u DM, HTN, Hyperlipidemia. No issues or concerns to report at this time.     Pt is due labs.    Pt declines maintenance  Screenings, mammogram and pap smear.        PMH  Past Medical History:   Diagnosis Date   • Acid reflux    • Diabetes ([facility])    • Diabetes mellitus type 2, noninsulin dependent ([facility])    • Diabetes mellitus, type 2 ([facility]) 02/12/2018   • Diabetic eye exam ([facility]) 06/23/2015    Last completed-Saulo Siu   • Dry eye syndrome    • Encounter for diabetic foot exam ([facility]) 11/20/2019    last completed- Done in office durning visit   • GERD (gastroesophageal reflux disease) 02/12/2018   • Glaucoma    • Pap smear for cervical cancer screening 08/30/2017   • Vaginal Pap smear 08/20/2017    For cancer screening   • Visit for screening mammogram 11/20/2019    Declined       ALLERGY  No Known Allergies     SURGICALHX  Past Surgical History:   Procedure Laterality Date   • APPENDECTOMY      Childhood   • COLONOSCOPY N/A 11/4/2021    Procedure: COLONOSCOPY with polypectomy.;  Surgeon: Silvano Garcia MD;  Location: [facility] ENDOSCOPY;  Service: General;  Laterality: N/A;  Transvers polyp   • OTHER SURGICAL HISTORY      Ear Surgery  - x3 hole in ear   • ROTATOR CUFF REPAIR Left    • SHOULDER LIGAMENT REPAIR Right    • TUBAL ABDOMINAL LIGATION  1986        SOCX  Social History     Tobacco Use   • Smoking status: Never Smoker   • Smokeless tobacco: Never Used   Substance Use Topics   • Alcohol use: Never       FAMHX  Family History   Problem Relation Age of Onset   • Diabetes Mother         mellitus   • Breast cancer Mother    • Diabetes type II Mother         Mellitus   • Heart disease Father    • Diabetes Sister         Mellitus   • Diabetes Brother         Mellitus   • Malig Hyperthermia Neg Hx         MEDSIGONLY  Current Outpatient  "Medications on File Prior to Visit   Medication Sig   • ascorbic acid (VITAMIN C) 1000 MG tablet Take 1,000 mg by mouth Daily.   • lisinopril (PRINIVIL,ZESTRIL) 2.5 MG tablet TAKE 1 TABLET BY MOUTH EVERY DAY   • Omega-3 Krill Oil 500 MG capsule Take  by mouth.   • rosuvastatin (CRESTOR) 10 MG tablet TAKE 1 TABLET BY MOUTH EVERYDAY AT BEDTIME   • Xigduo XR  MG tablet TAKE 1 TABLET BY ORAL ROUTE ONCE DAILY IN THE MORNING WITH FOOD FOR 90 DAYS   • [DISCONTINUED] methylPREDNISolone (MEDROL) 4 MG dose pack    • [DISCONTINUED] multivitamin with minerals (OCUVITE PRESERVISION PO) Take 1 tablet by mouth 2 (two) times a day.     No current facility-administered medications on file prior to visit.       Health Maintenance Due   Topic Date Due   • ANNUAL PHYSICAL  Never done   • Pneumococcal Vaccine 0-64 (1 of 2 - PPSV23) Never done   • COVID-19 Vaccine (1) Never done   • TDAP/TD VACCINES (1 - Tdap) Never done   • ZOSTER VACCINE (1 of 2) Never done   • HEPATITIS C SCREENING  Never done   • DIABETIC FOOT EXAM  06/15/2021   • DIABETIC EYE EXAM  06/15/2021   • INFLUENZA VACCINE  Never done       Objective     /67   Pulse 62   Ht 162.6 cm (64\")   Wt 69.9 kg (154 lb)   SpO2 99%   BMI 26.43 kg/m²       Physical Exam      Result Review :                           Assessment and Plan        Diagnoses and all orders for this visit:    1. Essential hypertension (Primary)  Comments:  stable on lisinopril 2.5mg, continue  Orders:  -     CBC w AUTO Differential; Future  -     Comprehensive metabolic panel; Future  -     Lipid panel; Future    2. Type 2 diabetes mellitus without complication, without long-term current use of insulin (HCC)  Comments:  stable on xiduo 10/1000mg continue  Orders:  -     CBC w AUTO Differential; Future  -     TSH; Future  -     T4, free; Future  -     MicroAlbumin, Urine, Random - Urine, Clean Catch; Future  -     Hemoglobin A1c; Future    3. Mixed hyperlipidemia  Comments:  stable on crestor " 10mg, continue    Orders:  -     Comprehensive metabolic panel; Future  -     Lipid panel; Future              Follow Up     Return in about 6 months (around 5/22/2022).    Patient was given instructions and counseling regarding her condition or for health maintenance advice. Please see specific information pulled into the AVS if appropriate.     Amanda Kamla  reports that she has never smoked. She has never used smokeless tobacco..

## 2021-11-23 ENCOUNTER — TELEPHONE (OUTPATIENT)
Dept: ORTHOPEDIC SURGERY | Facility: CLINIC | Age: 56
End: 2021-11-23

## 2021-11-23 ENCOUNTER — TELEPHONE (OUTPATIENT)
Dept: FAMILY MEDICINE CLINIC | Facility: CLINIC | Age: 56
End: 2021-11-23

## 2021-11-23 DIAGNOSIS — E11.65 TYPE 2 DIABETES MELLITUS WITH HYPERGLYCEMIA, WITHOUT LONG-TERM CURRENT USE OF INSULIN (HCC): Primary | ICD-10-CM

## 2021-11-23 NOTE — TELEPHONE ENCOUNTER
----- Message from DULCE Cabrales sent at 11/23/2021  6:39 AM EST -----  A1c is way too high lets refer to shelbi purdy for education and med mgmt

## 2021-11-23 NOTE — TELEPHONE ENCOUNTER
.    Caller: [employer]    What form or medical record are you requesting: PROGRESS NOTES FROM 10/1/21 TO PRESENT  Who is requesting this form or medical record from you: [employer].    How would you like to receive the form or medical records (pick-up, mail, fax): FAX  If fax, what is the fax number: [phone]    Timeframe paperwork needed: ASAP    Additional notes:HANNAH SULLIVAN REQUESTING PROGRESS NOTES FROM 10/1/21 TO PRESENT  ATTN: CASE # [id number]

## 2021-12-13 ENCOUNTER — TELEPHONE (OUTPATIENT)
Dept: FAMILY MEDICINE CLINIC | Facility: CLINIC | Age: 56
End: 2021-12-13

## 2021-12-13 NOTE — TELEPHONE ENCOUNTER
Caller: Amanda Cason    Relationship: Self    Best call back number: [phone]     What is the best time to reach you: ANYTIME    Who are you requesting to speak with (clinical staff, provider,  specific staff member): CLINICAL       What was the call regarding:     PATIENT IS REQUESTING A CALL REGARDING THE NEXT STEPS REGARDING HER SUGAR LEVELS. SHE ALSO WANT TO INFORM PCP THAT THEY PUT AN STEROID IN HER KNUCKLES AND THEY SAID IT COULD RAISE UP HER SUGAR LEVELS.   PLEASE CALL TO ADVISE       Do you require a callback: YES

## 2022-01-11 DIAGNOSIS — Z12.11 SCREENING FOR COLON CANCER: ICD-10-CM

## 2022-01-11 RX ORDER — ROSUVASTATIN CALCIUM 10 MG/1
TABLET, COATED ORAL
Qty: 90 TABLET | Refills: 1 | Status: SHIPPED | OUTPATIENT
Start: 2022-01-11 | End: 2022-07-11

## 2022-01-11 RX ORDER — SODIUM PICOSULFATE, MAGNESIUM OXIDE, AND ANHYDROUS CITRIC ACID 10; 3.5; 12 MG/160ML; G/160ML; G/160ML
LIQUID ORAL SEE ADMIN INSTRUCTIONS
Qty: 160 ML | Refills: 0 | Status: SHIPPED | OUTPATIENT
Start: 2022-01-11 | End: 2022-01-12

## 2022-01-12 ENCOUNTER — OFFICE VISIT (OUTPATIENT)
Dept: DIABETES SERVICES | Facility: HOSPITAL | Age: 57
End: 2022-01-12

## 2022-01-12 VITALS
DIASTOLIC BLOOD PRESSURE: 68 MMHG | WEIGHT: 153 LBS | HEART RATE: 67 BPM | RESPIRATION RATE: 24 BRPM | BODY MASS INDEX: 26.12 KG/M2 | SYSTOLIC BLOOD PRESSURE: 116 MMHG | OXYGEN SATURATION: 99 % | HEIGHT: 64 IN

## 2022-01-12 DIAGNOSIS — E11.65 UNCONTROLLED TYPE 2 DIABETES MELLITUS WITH HYPERGLYCEMIA: Primary | ICD-10-CM

## 2022-01-12 PROCEDURE — G0463 HOSPITAL OUTPT CLINIC VISIT: HCPCS | Performed by: NURSE PRACTITIONER

## 2022-01-12 PROCEDURE — 99214 OFFICE O/P EST MOD 30 MIN: CPT | Performed by: NURSE PRACTITIONER

## 2022-01-12 RX ORDER — GLIPIZIDE 5 MG/1
5 TABLET, FILM COATED, EXTENDED RELEASE ORAL DAILY
Qty: 30 TABLET | Refills: 2 | Status: SHIPPED | OUTPATIENT
Start: 2022-01-12 | End: 2022-03-28

## 2022-01-12 NOTE — PROGRESS NOTES
Chief Complaint  Diabetes (blood sugars have been out of control the past few months, last A1C was higher than before,  ) and Blood Sugar Problem (labs done in november, pt does get steroid shots in her hands/fingers every so often. )    Referred By: DULCE Cabrales presents to [facility] for diabetes medication management    History of Present Illness    Visit type:  an initial evaluation  Diabetes type:  Type 2  Age at time of diagnosis/Number of years: She was diagnosed in 2013  Current diabetes status/concerns/issues: She presents to the office today to establish care for her diabetes.  She states her blood sugars have been out of control over the last few months and her A1c has gone higher than it has been in the past.  Other health concerns: She is having some difficulty using her hands which may be related to past surgeries in the shoulder area; she has been treated with steroids for the hand issues using both injection and oral steroids.    Diabetes symptoms:    Polyuria: No   Polydipsia: No   Polyphagia: No   Blurred vision: No   Excessive fatigue: No  Diabetes complications:  Neuropathy:No  Nephropathy:No  Retinopathy:No  Amputation/Wounds:No  Gastroparesis:No  Cardiovascular Disease:Yes, HTN and hyperlipidemia  Erectile Dysfunction:N/A  Hospitalizations secondary to DM?  No  ER/911 Secondary to Dm?  No  Hypoglycemia:  She will experience symptoms of hypoglycemia but has not necessarily checked her blood sugar at the time of the event  Hypoglycemia Symptoms:  shaking/tremors, weakness and Spots in her vision  Current Diabetes treatment: She is currently taking Xigduo XR  once a day to take  Prior diabetes treatments: Metformin  Blood glucose device:  Meter  Blood glucose monitoring frequency:  1  Blood glucose range/average: Fasting glucose levels are consistently below 140  Diet:  Avoids high carb/sweet foods, Diet drinks  only; she has been using lemon juice to help lower her blood sugar  Activity/Exercise:  she tries to exercise    Past Medical History:   Diagnosis Date   • Acid reflux    • Diabetes ([facility])    • Diabetes mellitus type 2, noninsulin dependent ([facility])    • Diabetes mellitus, type 2 ([facility]) 02/12/2018   • Diabetic eye exam ([facility]) 06/23/2015    Last completed-Saulo Siu   • Dry eye syndrome    • Encounter for diabetic foot exam ([facility]) 11/20/2019    last completed- Done in office durning visit   • GERD (gastroesophageal reflux disease) 02/12/2018   • Glaucoma    • Hyperlipidemia    • Hypertension    • Pap smear for cervical cancer screening 08/30/2017   • Vaginal Pap smear 08/20/2017    For cancer screening   • Visit for screening mammogram 11/20/2019    Declined     Past Surgical History:   Procedure Laterality Date   • APPENDECTOMY      Childhood   • COLONOSCOPY N/A 11/4/2021    Procedure: COLONOSCOPY with polypectomy.;  Surgeon: Silvano Garcia MD;  Location: [facility] ENDOSCOPY;  Service: General;  Laterality: N/A;  Transvers polyp   • OTHER SURGICAL HISTORY      Ear Surgery  - x3 hole in ear   • ROTATOR CUFF REPAIR Left    • SHOULDER LIGAMENT REPAIR Right    • TUBAL ABDOMINAL LIGATION  1986     Family History   Problem Relation Age of Onset   • Diabetes Mother         mellitus   • Breast cancer Mother    • Diabetes type II Mother         Mellitus   • Heart disease Father    • Diabetes Sister         Mellitus   • Diabetes Brother         Mellitus   • Malig Hyperthermia Neg Hx      Social History     Socioeconomic History   • Marital status:    Tobacco Use   • Smoking status: Never Smoker   • Smokeless tobacco: Never Used   Vaping Use   • Vaping Use: Never used   Substance and Sexual Activity   • Alcohol use: Never   • Drug use: Never   • Sexual activity: Defer     No Known Allergies    Current Outpatient Medications:   •  ascorbic acid (VITAMIN C) 1000 MG tablet, Take 1,000 mg by mouth Daily., Disp: , Rfl:  "  •  lisinopril (PRINIVIL,ZESTRIL) 2.5 MG tablet, TAKE 1 TABLET BY MOUTH EVERY DAY, Disp: 90 tablet, Rfl: 1  •  Omega-3 Krill Oil 500 MG capsule, Take  by mouth., Disp: , Rfl:   •  rosuvastatin (CRESTOR) 10 MG tablet, TAKE 1 TABLET BY MOUTH EVERYDAY AT BEDTIME, Disp: 90 tablet, Rfl: 1  •  Xigduo XR  MG tablet, TAKE 1 TABLET BY ORAL ROUTE ONCE DAILY IN THE MORNING WITH FOOD FOR 90 DAYS, Disp: 90 tablet, Rfl: 1  •  glipizide (GLUCOTROL XL) 5 MG ER tablet, Take 1 tablet by mouth Daily., Disp: 30 tablet, Rfl: 2    Review of Systems   Constitutional: Negative for activity change, appetite change, fatigue, fever, unexpected weight gain and unexpected weight loss.   HENT: Negative for congestion, ear pain, facial swelling, hearing loss, sore throat and tinnitus.    Eyes: Negative for blurred vision, double vision, redness and visual disturbance.   Respiratory: Negative for cough, shortness of breath and wheezing.    Cardiovascular: Negative for chest pain, palpitations and leg swelling.   Gastrointestinal: Negative for abdominal distention, constipation, diarrhea, nausea, vomiting, GERD and indigestion.   Endocrine: Negative for polydipsia, polyphagia and polyuria.   Genitourinary: Negative for difficulty urinating, frequency and urgency.   Musculoskeletal: Negative for back pain, gait problem and myalgias.   Skin: Negative for rash, skin lesions and wound.   Neurological: Negative for seizures, speech difficulty, weakness, headache and confusion.   Psychiatric/Behavioral: Negative for sleep disturbance, depressed mood and stress. The patient is not nervous/anxious.         Objective     Vitals:    01/12/22 0901   BP: 116/68   BP Location: Right arm   Patient Position: Sitting   Cuff Size: Adult   Pulse: 67   Resp: 24   SpO2: 99%   Weight: 69.4 kg (153 lb)   Height: 162.6 cm (64\")   PainSc:   4     Body mass index is 26.26 kg/m².    Physical Exam  Constitutional:       Appearance: Normal appearance.      Comments: " Overweight with BMI of 26.26   HENT:      Head: Normocephalic and atraumatic.      Right Ear: External ear normal.      Left Ear: External ear normal.      Nose: Nose normal.   Eyes:      Extraocular Movements: Extraocular movements intact.      Conjunctiva/sclera: Conjunctivae normal.   Pulmonary:      Effort: Pulmonary effort is normal.   Musculoskeletal:         General: Normal range of motion.      Cervical back: Normal range of motion.   Skin:     General: Skin is warm and dry.   Neurological:      General: No focal deficit present.      Mental Status: She is alert and oriented to person, place, and time. Mental status is at baseline.   Psychiatric:         Mood and Affect: Mood normal.         Behavior: Behavior normal.         Thought Content: Thought content normal.         Judgment: Judgment normal.         Result Review :   The following data was reviewed by: DULCE Serrano on 01/12/2022:    Her most recent A1c was collected 11/22/2021 was 8.91 indicating uncontrolled type 2 diabetes.  This is up from a prior result of 7.6 collected in May 2021    Most Recent A1C    HGBA1C Most Recent 11/22/21   Hemoglobin A1C 8.91 (A)   (A) Abnormal value              A1C Last 3 Results    HGBA1C Last 3 Results 5/24/21 11/22/21   Hemoglobin A1C 7.6 (A) 8.91 (A)   (A) Abnormal value       Comments are available for some flowsheets but are not being displayed.             Creatinine   Date Value Ref Range Status   11/22/2021 0.83 0.57 - 1.00 mg/dL Final   11/11/2021 0.59 0.57 - 1.00 mg/dL Final       eGFR Non  Amer   Date Value Ref Range Status   11/22/2021 71 >60 mL/min/1.73 Final   11/11/2021 106 >60 mL/min/1.73 Final       Microalbumin, Urine   Date Value Ref Range Status   11/22/2021 <1.2 mg/dL Final       Labs collected on 11/22/2021 show normal renal function          Assessment: The patient has had an increase in her A1c.  Her fasting glucose levels are fairly well controlled so she may be having  some postprandial hyperglycemia.  She may also be experiencing some steroid-induced hyperglycemia as well.      Diagnoses and all orders for this visit:    1. Uncontrolled type 2 diabetes mellitus with hyperglycemia (HCC) (Primary)    Other orders  -     glipizide (GLUCOTROL XL) 5 MG ER tablet; Take 1 tablet by mouth Daily.  Dispense: 30 tablet; Refill: 2        Plan: We will try adding glipizide 5 mg extended release once a day to her current treatment plan.  She will monitor her glucose levels twice a day and record them for review.  If the medication is ineffective we will consider other treatment options.    The patient will monitor her blood glucose levels two times each day.  If she develops problematic hyperglycemia or hypoglycemia or adverse drug reaction, she will contact the office for further instructions.        Follow Up     Return in about 6 weeks (around 2/23/2022) for Medication Management.    Patient was given instructions and counseling regarding her condition or for health maintenance advice. Please see specific information pulled into the AVS if appropriate.     Angelica Carrillo, DULCE  01/12/2022

## 2022-01-12 NOTE — PATIENT INSTRUCTIONS
Continue taking the Xigduo as previously prescribed    Begin taking glipizide 5 mg once a day every morning with breakfast

## 2022-03-02 ENCOUNTER — OFFICE VISIT (OUTPATIENT)
Dept: DIABETES SERVICES | Facility: HOSPITAL | Age: 57
End: 2022-03-02

## 2022-03-02 VITALS
SYSTOLIC BLOOD PRESSURE: 111 MMHG | BODY MASS INDEX: 26.16 KG/M2 | WEIGHT: 153.22 LBS | RESPIRATION RATE: 16 BRPM | OXYGEN SATURATION: 100 % | TEMPERATURE: 97.8 F | HEIGHT: 64 IN | DIASTOLIC BLOOD PRESSURE: 61 MMHG | HEART RATE: 66 BPM

## 2022-03-02 DIAGNOSIS — E11.9 CONTROLLED TYPE 2 DIABETES MELLITUS WITHOUT COMPLICATION, WITHOUT LONG-TERM CURRENT USE OF INSULIN: Primary | ICD-10-CM

## 2022-03-02 LAB
EXPIRATION DATE: ABNORMAL
GLUCOSE BLDC GLUCOMTR-MCNC: 112 MG/DL (ref 70–99)
HBA1C MFR BLD: 6.6 %
Lab: ABNORMAL

## 2022-03-02 PROCEDURE — 99213 OFFICE O/P EST LOW 20 MIN: CPT | Performed by: NURSE PRACTITIONER

## 2022-03-02 PROCEDURE — 82962 GLUCOSE BLOOD TEST: CPT | Performed by: NURSE PRACTITIONER

## 2022-03-02 PROCEDURE — G0463 HOSPITAL OUTPT CLINIC VISIT: HCPCS | Performed by: NURSE PRACTITIONER

## 2022-03-02 PROCEDURE — 83036 HEMOGLOBIN GLYCOSYLATED A1C: CPT | Performed by: NURSE PRACTITIONER

## 2022-03-02 NOTE — PROGRESS NOTES
Chief Complaint  Diabetes (she has her blood sugar log that she has for you to look at , )    Referred By: DULCE Cabrales presents to [facility] for diabetes medication management    History of Present Illness    Visit type:  follow-up  Diabetes type:  Type 2  Current diabetes status/concerns/issues: She denies any concerns regarding her diabetes today.  She feels like her blood sugars have improved since adding the glipizide at her last appointment.  Other health concerns: She denies any new health concerns  Diabetes symptoms:    Polyuria: No   Polydipsia: No   Polyphagia: No   Blurred vision: No   Excessive fatigue: No  Diabetes complications:  Neuropathy:No  Nephropathy:No  Retinopathy:No  Amputation/Wounds:No  Gastroparesis:No  Cardiovascular Disease:Yes, HTN and hyperlipidemia  Erectile Dysfunction:N/A  Hypoglycemia:  None reported at this time  Hypoglycemia Symptoms:  No hypoglycemia at this time  Current diabetes treatment:  Xigduo XR  once a day and glipizide XL 5 mg once a day which was added at the last appt.  Blood glucose device:  Meter  Blood glucose monitoring frequency:  1 - 2  Blood glucose range/average: She brings blood glucose logs to the appointment today that show fasting glucose levels typically staying less than 130 mg/dL  Diet:  Avoids high carb/sweet foods, Diet drinks only  Activity/Exercise:  Walking    Past Medical History:   Diagnosis Date   • Acid reflux    • Diabetes ([facility])    • Diabetes mellitus type 2, noninsulin dependent ([facility])    • Diabetes mellitus, type 2 ([facility]) 02/12/2018   • Diabetic eye exam ([facility]) 06/23/2015    Last completed-Saulo Siu   • Dry eye syndrome    • Encounter for diabetic foot exam ([facility]) 11/20/2019    last completed- Done in office durning visit   • GERD (gastroesophageal reflux disease) 02/12/2018   • Glaucoma    • Hyperlipidemia    • Hypertension    • Pap smear for cervical  cancer screening 08/30/2017   • Vaginal Pap smear 08/20/2017    For cancer screening   • Visit for screening mammogram 11/20/2019    Declined     Past Surgical History:   Procedure Laterality Date   • APPENDECTOMY      Childhood   • COLONOSCOPY N/A 11/4/2021    Procedure: COLONOSCOPY with polypectomy.;  Surgeon: Silvano Garcia MD;  Location: [facility] ENDOSCOPY;  Service: General;  Laterality: N/A;  Transvers polyp   • OTHER SURGICAL HISTORY      Ear Surgery  - x3 hole in ear   • ROTATOR CUFF REPAIR Left    • SHOULDER LIGAMENT REPAIR Right    • TUBAL ABDOMINAL LIGATION  1986     Family History   Problem Relation Age of Onset   • Diabetes Mother         mellitus   • Breast cancer Mother    • Diabetes type II Mother         Mellitus   • Heart disease Father    • Diabetes Sister         Mellitus   • Diabetes Brother         Mellitus   • Malig Hyperthermia Neg Hx      Social History     Socioeconomic History   • Marital status:    Tobacco Use   • Smoking status: Never Smoker   • Smokeless tobacco: Never Used   Vaping Use   • Vaping Use: Never used   Substance and Sexual Activity   • Alcohol use: Never   • Drug use: Never   • Sexual activity: Defer     No Known Allergies    Current Outpatient Medications:   •  ascorbic acid (VITAMIN C) 1000 MG tablet, Take 1,000 mg by mouth Daily., Disp: , Rfl:   •  glipizide (GLUCOTROL XL) 5 MG ER tablet, Take 1 tablet by mouth Daily., Disp: 30 tablet, Rfl: 2  •  lisinopril (PRINIVIL,ZESTRIL) 2.5 MG tablet, TAKE 1 TABLET BY MOUTH EVERY DAY, Disp: 90 tablet, Rfl: 1  •  Omega-3 Krill Oil 500 MG capsule, Take  by mouth., Disp: , Rfl:   •  rosuvastatin (CRESTOR) 10 MG tablet, TAKE 1 TABLET BY MOUTH EVERYDAY AT BEDTIME, Disp: 90 tablet, Rfl: 1  •  Xigduo XR  MG tablet, TAKE 1 TABLET BY ORAL ROUTE ONCE DAILY IN THE MORNING WITH FOOD FOR 90 DAYS, Disp: 90 tablet, Rfl: 1    Review of Systems   Constitutional: Negative for activity change, appetite change, fatigue, fever,  "unexpected weight gain and unexpected weight loss.   HENT: Negative for congestion, ear pain, facial swelling, hearing loss, sore throat and tinnitus.    Eyes: Negative for blurred vision, double vision, redness and visual disturbance.   Respiratory: Negative for cough, shortness of breath and wheezing.    Cardiovascular: Negative for chest pain, palpitations and leg swelling.   Gastrointestinal: Positive for GERD. Negative for abdominal distention, constipation, diarrhea, nausea, vomiting and indigestion.   Endocrine: Negative for polydipsia, polyphagia and polyuria.   Genitourinary: Negative for difficulty urinating, frequency and urgency.   Musculoskeletal: Negative for back pain, gait problem and myalgias.   Skin: Negative for rash, skin lesions and wound.   Neurological: Negative for seizures, speech difficulty, weakness, headache and confusion.   Psychiatric/Behavioral: Negative for sleep disturbance, depressed mood and stress. The patient is not nervous/anxious.         Objective     Vitals:    03/02/22 0813   BP: 111/61   BP Location: Right arm   Patient Position: Sitting   Cuff Size: Adult   Pulse: 66   Resp: 16   Temp: 97.8 °F (36.6 °C)   SpO2: 100%   Weight: 69.5 kg (153 lb 3.5 oz)   Height: 162.6 cm (64\")   PainSc:   2     Body mass index is 26.3 kg/m².    Physical Exam  Constitutional:       Appearance: Normal appearance.      Comments: Overweight with BMI of 26.3   HENT:      Head: Normocephalic and atraumatic.      Right Ear: External ear normal.      Left Ear: External ear normal.      Nose: Nose normal.   Eyes:      Extraocular Movements: Extraocular movements intact.      Conjunctiva/sclera: Conjunctivae normal.   Pulmonary:      Effort: Pulmonary effort is normal.   Musculoskeletal:         General: Normal range of motion.      Cervical back: Normal range of motion.   Skin:     General: Skin is warm and dry.   Neurological:      General: No focal deficit present.      Mental Status: She is alert " and oriented to person, place, and time. Mental status is at baseline.   Psychiatric:         Mood and Affect: Mood normal.         Behavior: Behavior normal.         Thought Content: Thought content normal.         Judgment: Judgment normal.         Result Review :   The following data was reviewed by: DULCE Serrano on 03/02/2022:    Point-of-care A1c collected in the office today was 6.6% indicating controlled type 2 diabetes.  This is down from the prior result of 8.91 collected in November 2021.    Most Recent A1C    HGBA1C Most Recent 3/2/22   Hemoglobin A1C 6.6             A1C Last 3 Results    HGBA1C Last 3 Results 5/24/21 11/22/21 3/2/22   Hemoglobin A1C 7.6 (A) 8.91 (A) 6.6   (A) Abnormal value       Comments are available for some flowsheets but are not being displayed.                     Assessment: Patient has had a significant improvement in her A1c since adding the glipizide to her treatment plan.  She is also focusing on her diet and physical activity to help control glucose levels as well.      Diagnoses and all orders for this visit:    1. Controlled type 2 diabetes mellitus without complication, without long-term current use of insulin (HCC) (Primary)  -     POC Glycosylated Hemoglobin (Hb A1C)    Other orders  -     POC Glucose        Plan: No changes were made in her treatment plan.  She will be scheduled for routine follow-up appointment and reevaluation of her A1c.    The patient will monitor her blood glucose levels 1-2 times each day.  If she develops problematic hyperglycemia or hypoglycemia or adverse drug reaction, she will contact the office for further instructions.        Follow Up     Return in about 3 months (around 6/2/2022) for Medication Management.    Patient was given instructions and counseling regarding her condition or for health maintenance advice. Please see specific information pulled into the AVS if appropriate.     DULCE Serrano  03/02/2022

## 2022-03-28 RX ORDER — GLIPIZIDE 5 MG/1
TABLET, FILM COATED, EXTENDED RELEASE ORAL
Qty: 90 TABLET | Refills: 1 | Status: SHIPPED | OUTPATIENT
Start: 2022-03-28 | End: 2022-07-07 | Stop reason: SDUPTHER

## 2022-04-14 RX ORDER — LISINOPRIL 2.5 MG/1
TABLET ORAL
Qty: 90 TABLET | Refills: 1 | Status: SHIPPED | OUTPATIENT
Start: 2022-04-14 | End: 2022-10-07

## 2022-04-14 RX ORDER — DAPAGLIFLOZIN AND METFORMIN HYDROCHLORIDE 10; 1000 MG/1; MG/1
TABLET, FILM COATED, EXTENDED RELEASE ORAL
Qty: 90 TABLET | Refills: 1 | Status: SHIPPED | OUTPATIENT
Start: 2022-04-14 | End: 2022-07-07 | Stop reason: SDUPTHER

## 2022-05-19 ENCOUNTER — TRANSCRIBE ORDERS (OUTPATIENT)
Dept: ADMINISTRATIVE | Facility: HOSPITAL | Age: 57
End: 2022-05-19

## 2022-05-19 DIAGNOSIS — R20.0 NUMBNESS: Primary | ICD-10-CM

## 2022-05-23 ENCOUNTER — OFFICE VISIT (OUTPATIENT)
Dept: FAMILY MEDICINE CLINIC | Facility: CLINIC | Age: 57
End: 2022-05-23

## 2022-05-23 ENCOUNTER — LAB (OUTPATIENT)
Dept: LAB | Facility: HOSPITAL | Age: 57
End: 2022-05-23

## 2022-05-23 VITALS
HEIGHT: 64 IN | SYSTOLIC BLOOD PRESSURE: 115 MMHG | DIASTOLIC BLOOD PRESSURE: 64 MMHG | WEIGHT: 159 LBS | BODY MASS INDEX: 27.14 KG/M2 | HEART RATE: 57 BPM | OXYGEN SATURATION: 99 %

## 2022-05-23 DIAGNOSIS — E78.5 HYPERLIPIDEMIA, UNSPECIFIED HYPERLIPIDEMIA TYPE: Primary | ICD-10-CM

## 2022-05-23 DIAGNOSIS — E11.9 TYPE 2 DIABETES MELLITUS WITHOUT COMPLICATION, WITHOUT LONG-TERM CURRENT USE OF INSULIN: ICD-10-CM

## 2022-05-23 DIAGNOSIS — Z11.59 NEED FOR HEPATITIS C SCREENING TEST: ICD-10-CM

## 2022-05-23 DIAGNOSIS — I10 ESSENTIAL HYPERTENSION: ICD-10-CM

## 2022-05-23 DIAGNOSIS — E78.5 HYPERLIPIDEMIA, UNSPECIFIED HYPERLIPIDEMIA TYPE: ICD-10-CM

## 2022-05-23 LAB
ALBUMIN SERPL-MCNC: 4.5 G/DL (ref 3.5–5.2)
ALBUMIN/GLOB SERPL: 2.1 G/DL
ALP SERPL-CCNC: 88 U/L (ref 39–117)
ALT SERPL W P-5'-P-CCNC: 24 U/L (ref 1–33)
ANION GAP SERPL CALCULATED.3IONS-SCNC: 10 MMOL/L (ref 5–15)
AST SERPL-CCNC: 15 U/L (ref 1–32)
BASOPHILS # BLD AUTO: 0.04 10*3/MM3 (ref 0–0.2)
BASOPHILS NFR BLD AUTO: 0.6 % (ref 0–1.5)
BILIRUB SERPL-MCNC: 0.3 MG/DL (ref 0–1.2)
BUN SERPL-MCNC: 14 MG/DL (ref 6–20)
BUN/CREAT SERPL: 17.5 (ref 7–25)
CALCIUM SPEC-SCNC: 9.8 MG/DL (ref 8.6–10.5)
CHLORIDE SERPL-SCNC: 105 MMOL/L (ref 98–107)
CHOLEST SERPL-MCNC: 176 MG/DL (ref 0–200)
CO2 SERPL-SCNC: 26 MMOL/L (ref 22–29)
CREAT SERPL-MCNC: 0.8 MG/DL (ref 0.57–1)
DEPRECATED RDW RBC AUTO: 39.4 FL (ref 37–54)
EGFRCR SERPLBLD CKD-EPI 2021: 86.6 ML/MIN/1.73
EOSINOPHIL # BLD AUTO: 0.1 10*3/MM3 (ref 0–0.4)
EOSINOPHIL NFR BLD AUTO: 1.4 % (ref 0.3–6.2)
ERYTHROCYTE [DISTWIDTH] IN BLOOD BY AUTOMATED COUNT: 11.8 % (ref 12.3–15.4)
GLOBULIN UR ELPH-MCNC: 2.1 GM/DL
GLUCOSE SERPL-MCNC: 102 MG/DL (ref 65–99)
HAV IGM SERPL QL IA: NORMAL
HBA1C MFR BLD: 6.7 % (ref 4.8–5.6)
HBV CORE IGM SERPL QL IA: NORMAL
HBV SURFACE AG SERPL QL IA: NORMAL
HCT VFR BLD AUTO: 43.5 % (ref 34–46.6)
HCV AB SER DONR QL: NORMAL
HDLC SERPL-MCNC: 48 MG/DL (ref 40–60)
HGB BLD-MCNC: 14.4 G/DL (ref 12–15.9)
IMM GRANULOCYTES # BLD AUTO: 0.02 10*3/MM3 (ref 0–0.05)
IMM GRANULOCYTES NFR BLD AUTO: 0.3 % (ref 0–0.5)
LDLC SERPL CALC-MCNC: 103 MG/DL (ref 0–100)
LDLC/HDLC SERPL: 2.08 {RATIO}
LYMPHOCYTES # BLD AUTO: 2.07 10*3/MM3 (ref 0.7–3.1)
LYMPHOCYTES NFR BLD AUTO: 29.8 % (ref 19.6–45.3)
MCH RBC QN AUTO: 30.3 PG (ref 26.6–33)
MCHC RBC AUTO-ENTMCNC: 33.1 G/DL (ref 31.5–35.7)
MCV RBC AUTO: 91.6 FL (ref 79–97)
MONOCYTES # BLD AUTO: 0.46 10*3/MM3 (ref 0.1–0.9)
MONOCYTES NFR BLD AUTO: 6.6 % (ref 5–12)
NEUTROPHILS NFR BLD AUTO: 4.26 10*3/MM3 (ref 1.7–7)
NEUTROPHILS NFR BLD AUTO: 61.3 % (ref 42.7–76)
NRBC BLD AUTO-RTO: 0 /100 WBC (ref 0–0.2)
PLATELET # BLD AUTO: 248 10*3/MM3 (ref 140–450)
PMV BLD AUTO: 10.1 FL (ref 6–12)
POTASSIUM SERPL-SCNC: 4.7 MMOL/L (ref 3.5–5.2)
PROT SERPL-MCNC: 6.6 G/DL (ref 6–8.5)
RBC # BLD AUTO: 4.75 10*6/MM3 (ref 3.77–5.28)
SODIUM SERPL-SCNC: 141 MMOL/L (ref 136–145)
T4 FREE SERPL-MCNC: 1.26 NG/DL (ref 0.93–1.7)
TRIGL SERPL-MCNC: 140 MG/DL (ref 0–150)
TSH SERPL DL<=0.05 MIU/L-ACNC: 2.06 UIU/ML (ref 0.27–4.2)
VLDLC SERPL-MCNC: 25 MG/DL (ref 5–40)
WBC NRBC COR # BLD: 6.95 10*3/MM3 (ref 3.4–10.8)

## 2022-05-23 PROCEDURE — 83036 HEMOGLOBIN GLYCOSYLATED A1C: CPT

## 2022-05-23 PROCEDURE — 99214 OFFICE O/P EST MOD 30 MIN: CPT | Performed by: NURSE PRACTITIONER

## 2022-05-23 PROCEDURE — 84439 ASSAY OF FREE THYROXINE: CPT

## 2022-05-23 PROCEDURE — 80074 ACUTE HEPATITIS PANEL: CPT

## 2022-05-23 PROCEDURE — 80061 LIPID PANEL: CPT

## 2022-05-23 PROCEDURE — 80050 GENERAL HEALTH PANEL: CPT

## 2022-05-23 PROCEDURE — 36415 COLL VENOUS BLD VENIPUNCTURE: CPT

## 2022-05-23 NOTE — PROGRESS NOTES
Chief Complaint  Hypertension, Hyperlipidemia, and Diabetes    Subjective            Amanda Cason presents to [facility]  Pt here today for 6 month follow up for HTN, HLD, and HTN.    Pt sees Angelica Gerardo for DM. Pt reports her BS at home this morning was 103.    Pt declines mammo and pap.  She understands the risks of not having.  Encouraged monthly self breast exams.    Pt is due foot exam, will complete in office today.    Pt is due eye exam, pt will self schedule.  Pt given paper to be faxed back to us after completion.    Labs done 11/22/21        Past Medical History:   Diagnosis Date   • Acid reflux    • Diabetes (HCC)    • Diabetes mellitus type 2, noninsulin dependent (HCC)    • Diabetes mellitus, type 2 (HCC) 02/12/2018   • Diabetic eye exam ([facility]) 06/23/2015    Last completed-Saulo Siu   • Dry eye syndrome    • Encounter for diabetic foot exam ([facility]) 11/20/2019    last completed- Done in office durning visit   • GERD (gastroesophageal reflux disease) 02/12/2018   • Glaucoma    • Hyperlipidemia    • Hypertension    • Pap smear for cervical cancer screening 08/30/2017   • Vaginal Pap smear 08/20/2017    For cancer screening   • Visit for screening mammogram 11/20/2019    Declined       No Known Allergies     Past Surgical History:   Procedure Laterality Date   • APPENDECTOMY      Childhood   • COLONOSCOPY N/A 11/4/2021    Procedure: COLONOSCOPY with polypectomy.;  Surgeon: Silvano Garcia MD;  Location: [facility] ENDOSCOPY;  Service: General;  Laterality: N/A;  Transvers polyp   • OTHER SURGICAL HISTORY      Ear Surgery  - x3 hole in ear   • ROTATOR CUFF REPAIR Left    • SHOULDER LIGAMENT REPAIR Right    • TUBAL ABDOMINAL LIGATION  1986        Social History     Tobacco Use   • Smoking status: Never Smoker   • Smokeless tobacco: Never Used   Substance Use Topics   • Alcohol use: Never       Family History   Problem Relation Age of Onset   • Diabetes Mother          "mellitus   • Breast cancer Mother    • Diabetes type II Mother         Mellitus   • Heart disease Father    • Diabetes Sister         Mellitus   • Diabetes Brother         Mellitus   • Malig Hyperthermia Neg Hx         Current Outpatient Medications on File Prior to Visit   Medication Sig   • ascorbic acid (VITAMIN C) 1000 MG tablet Take 1,000 mg by mouth Daily.   • glipizide (GLUCOTROL XL) 5 MG ER tablet TAKE 1 TABLET BY MOUTH EVERY DAY   • lisinopril (PRINIVIL,ZESTRIL) 2.5 MG tablet TAKE 1 TABLET BY MOUTH EVERY DAY   • Omega-3 Krill Oil 500 MG capsule Take  by mouth.   • rosuvastatin (CRESTOR) 10 MG tablet TAKE 1 TABLET BY MOUTH EVERYDAY AT BEDTIME   • Xigduo XR  MG tablet TAKE 1 TABLET BY ORAL ROUTE ONCE DAILY IN THE MORNING WITH FOOD FOR 90 DAYS     No current facility-administered medications on file prior to visit.       Health Maintenance Due   Topic Date Due   • ANNUAL PHYSICAL  Never done   • Pneumococcal Vaccine 0-64 (1 - PCV) Never done   • Hepatitis B (1 of 3 - Risk 3-dose series) Never done   • TDAP/TD VACCINES (1 - Tdap) Never done   • ZOSTER VACCINE (1 of 2) Never done   • HEPATITIS C SCREENING  Never done   • DIABETIC FOOT EXAM  06/15/2021   • DIABETIC EYE EXAM  06/15/2021       Objective     /64   Pulse 57   Ht 162.6 cm (64\")   Wt 72.1 kg (159 lb)   SpO2 99%   BMI 27.29 kg/m²       Physical Exam  Constitutional:       General: She is not in acute distress.     Appearance: Normal appearance. She is not ill-appearing.   HENT:      Head: Normocephalic and atraumatic.   Cardiovascular:      Rate and Rhythm: Normal rate and regular rhythm.      Pulses:           Dorsalis pedis pulses are 2+ on the right side and 2+ on the left side.      Heart sounds: Normal heart sounds. No murmur heard.  Pulmonary:      Effort: Pulmonary effort is normal. No respiratory distress.      Breath sounds: Normal breath sounds.   Chest:      Chest wall: No tenderness.   Abdominal:      General: There is no " distension.      Palpations: There is no mass.      Tenderness: There is no abdominal tenderness. There is no guarding.   Musculoskeletal:         General: No swelling or tenderness. Normal range of motion.      Cervical back: Normal range of motion and neck supple.   Feet:      Right foot:      Protective Sensation: 3 sites tested. 3 sites sensed.      Skin integrity: Skin integrity normal. No ulcer or blister.      Toenail Condition: Right toenails are normal.      Left foot:      Protective Sensation: 3 sites tested. 3 sites sensed.      Skin integrity: Skin integrity normal. No ulcer or blister.      Toenail Condition: Left toenails are normal.      Comments:      Skin:     General: Skin is warm and dry.      Findings: No rash.   Neurological:      General: No focal deficit present.      Mental Status: She is alert and oriented to person, place, and time. Mental status is at baseline.      Gait: Gait normal.   Psychiatric:         Mood and Affect: Mood normal.         Behavior: Behavior normal.         Thought Content: Thought content normal.         Judgment: Judgment normal.           Result Review :                           Assessment and Plan        Diagnoses and all orders for this visit:    1. Hyperlipidemia, unspecified hyperlipidemia type (Primary)  Comments:  stable on crestor 10mg, continue  Orders:  -     Comprehensive metabolic panel; Future  -     Lipid panel; Future    2. Need for hepatitis C screening test  -     Hepatitis panel, acute; Future    3. Essential hypertension  Comments:  stable on lisinpril 2.5mg, continue  Orders:  -     CBC w AUTO Differential; Future  -     Comprehensive metabolic panel; Future  -     Lipid panel; Future  -     TSH; Future  -     T4, free; Future    4. Type 2 diabetes mellitus without complication, without long-term current use of insulin (HCC)  Comments:  currently managed by nikki Richardson to f/u for management  Orders:  -     Hemoglobin A1c;  Future              Follow Up     Return in about 6 months (around 11/23/2022).    Patient was given instructions and counseling regarding her condition or for health maintenance advice. Please see specific information pulled into the AVS if appropriate.     Amanda Kamla  reports that she has never smoked. She has never used smokeless tobacco.

## 2022-05-24 ENCOUNTER — TELEPHONE (OUTPATIENT)
Dept: FAMILY MEDICINE CLINIC | Facility: CLINIC | Age: 57
End: 2022-05-24

## 2022-06-10 ENCOUNTER — HOSPITAL ENCOUNTER (OUTPATIENT)
Dept: MRI IMAGING | Facility: HOSPITAL | Age: 57
End: 2022-06-10

## 2022-07-02 ENCOUNTER — HOSPITAL ENCOUNTER (OUTPATIENT)
Dept: MRI IMAGING | Facility: HOSPITAL | Age: 57
Discharge: HOME | End: 2022-07-02

## 2022-07-02 DIAGNOSIS — R20.0 NUMBNESS: ICD-10-CM

## 2022-07-02 PROCEDURE — 73220 MRI UPPR EXTREMITY W/O&W/DYE: CPT

## 2022-07-02 PROCEDURE — 72141 MRI NECK SPINE W/O DYE: CPT

## 2022-07-02 PROCEDURE — 0 GADOBENATE DIMEGLUMINE 529 MG/ML SOLUTION: Performed by: STUDENT IN AN ORGANIZED HEALTH CARE EDUCATION/TRAINING PROGRAM

## 2022-07-02 PROCEDURE — A9577 INJ MULTIHANCE: HCPCS | Performed by: STUDENT IN AN ORGANIZED HEALTH CARE EDUCATION/TRAINING PROGRAM

## 2022-07-02 RX ADMIN — GADOBENATE DIMEGLUMINE 15 ML: 529 INJECTION, SOLUTION INTRAVENOUS at 10:05

## 2022-07-05 ENCOUNTER — TRANSCRIBE ORDERS (OUTPATIENT)
Dept: ADMINISTRATIVE | Facility: HOSPITAL | Age: 57
End: 2022-07-05

## 2022-07-05 DIAGNOSIS — M48.02 CERVICAL SPINAL STENOSIS: Primary | ICD-10-CM

## 2022-07-07 ENCOUNTER — OFFICE VISIT (OUTPATIENT)
Dept: DIABETES SERVICES | Facility: HOSPITAL | Age: 57
End: 2022-07-07

## 2022-07-07 VITALS
HEIGHT: 64 IN | WEIGHT: 157.63 LBS | HEART RATE: 66 BPM | BODY MASS INDEX: 26.91 KG/M2 | DIASTOLIC BLOOD PRESSURE: 69 MMHG | TEMPERATURE: 98.7 F | SYSTOLIC BLOOD PRESSURE: 115 MMHG | OXYGEN SATURATION: 100 %

## 2022-07-07 DIAGNOSIS — E11.9 CONTROLLED TYPE 2 DIABETES MELLITUS WITHOUT COMPLICATION, WITHOUT LONG-TERM CURRENT USE OF INSULIN: Primary | ICD-10-CM

## 2022-07-07 DIAGNOSIS — E66.3 OVERWEIGHT (BMI 25.0-29.9): ICD-10-CM

## 2022-07-07 PROCEDURE — 99213 OFFICE O/P EST LOW 20 MIN: CPT | Performed by: NURSE PRACTITIONER

## 2022-07-07 PROCEDURE — G0463 HOSPITAL OUTPT CLINIC VISIT: HCPCS | Performed by: NURSE PRACTITIONER

## 2022-07-07 RX ORDER — DAPAGLIFLOZIN AND METFORMIN HYDROCHLORIDE 10; 1000 MG/1; MG/1
1 TABLET, FILM COATED, EXTENDED RELEASE ORAL DAILY
Qty: 90 TABLET | Refills: 1 | Status: SHIPPED | OUTPATIENT
Start: 2022-07-07 | End: 2022-12-28 | Stop reason: SDUPTHER

## 2022-07-07 RX ORDER — GLIPIZIDE 5 MG/1
5 TABLET, FILM COATED, EXTENDED RELEASE ORAL DAILY
Qty: 90 TABLET | Refills: 1 | Status: SHIPPED | OUTPATIENT
Start: 2022-07-07 | End: 2022-12-28 | Stop reason: SDUPTHER

## 2022-07-07 NOTE — PROGRESS NOTES
Chief Complaint  Diabetes (Follow up, recent labs, pt has log book for you to look at, having surgery on her left thumb next week)    Referred By: DULCE Cabrales    Subjective          Amanda Cason presents to [facility] for diabetes medication management    History of Present Illness    Visit type:  follow-up  Diabetes type:  Type 2  Current diabetes status/concerns/issues: She denies any specific concerns regarding her diabetes today.  Other health concerns: No new health issues  Diabetes symptoms:    Polyuria: No   Polydipsia: No   Polyphagia: No   Blurred vision: No   Excessive fatigue: Yes  Diabetes complications:  Neuro: None  Renal: None  Eyes: None  Amputation/Wounds: None  GI: None  Cardiovascular: hypertension and hyperlipidemia  ED: N/A  Other: None  Hypoglycemia:  None reported at this time  Hypoglycemia Symptoms:  No hypoglycemia at this time  Current diabetes treatment:  Xigduo XR  once a day and glipizide XL 5 mg once a day  Blood glucose device:  Meter  Blood glucose monitoring frequency:  1  Blood glucose range/average:  Fasting blood sugars are staying below 120 mg/dl  Diet:  Limits high carb/sweet foods, Avoids sugary drinks  Activity/Exercise:  walking    Past Medical History:   Diagnosis Date   • Acid reflux    • Arthritis     NECK, HANDS   • Deafness in left ear    • Diabetes mellitus type 2, noninsulin dependent (HCC)    • Diabetic eye exam (HCC) 06/23/2015    Last completed-Saulo Siu   • Dry eye syndrome    • Encounter for diabetic foot exam (HCC) 11/20/2019    last completed- Done in office durning visit   • GERD (gastroesophageal reflux disease) 02/12/2018   • Glaucoma    • Hyperlipidemia    • Pap smear for cervical cancer screening 08/30/2017   • Subcutaneous mass of left thumb    • Vaginal Pap smear 08/20/2017    For cancer screening   • Visit for screening mammogram 11/20/2019    Declined     Past Surgical History:   Procedure  Laterality Date   • APPENDECTOMY      Childhood   • COLONOSCOPY N/A 11/04/2021    Procedure: COLONOSCOPY with polypectomy.;  Surgeon: Silvano Garcia MD;  Location: [facility];  Service: General;  Laterality: N/A;  Transvers polyp   • ENDOMETRIAL ABLATION     • INCISION AND DRAINAGE OF WOUND Left 7/15/2022    Procedure: LEFT THUMB VOLAR MASS EXCISION;  Surgeon: Sergio Calvin MD;  Location:  [facility] [place];  Service: Orthopedics;  Laterality: Left;   • OTHER SURGICAL HISTORY Left     Ear Surgery  - x3 hole in ear   • ROTATOR CUFF REPAIR Left    • SHOULDER LIGAMENT REPAIR Right    • TUBAL ABDOMINAL LIGATION  1986     Family History   Problem Relation Age of Onset   • Diabetes Mother         mellitus   • Breast cancer Mother    • Diabetes type II Mother         Mellitus   • Heart disease Father    • Diabetes Sister         Mellitus   • Diabetes Brother         Mellitus   • Malig Hyperthermia Neg Hx      Social History     Socioeconomic History   • Marital status:    • Number of children: 2   Tobacco Use   • Smoking status: Never Smoker   • Smokeless tobacco: Never Used   Vaping Use   • Vaping Use: Never used   Substance and Sexual Activity   • Alcohol use: Never   • Drug use: Never   • Sexual activity: Defer     No Known Allergies    Current Outpatient Medications:   •  ascorbic acid (VITAMIN C) 1000 MG tablet, Take 1,000 mg by mouth Daily., Disp: , Rfl:   •  dapagliflozin-metformin HCl ER (Xigduo XR)  MG tablet, Take 1 tablet by mouth Daily., Disp: 90 tablet, Rfl: 1  •  glipizide (GLUCOTROL XL) 5 MG ER tablet, Take 1 tablet by mouth Daily., Disp: 90 tablet, Rfl: 1  •  lisinopril (PRINIVIL,ZESTRIL) 2.5 MG tablet, TAKE 1 TABLET BY MOUTH EVERY DAY (Patient taking differently: Take 2.5 mg by mouth Daily.), Disp: 90 tablet, Rfl: 1  •  Omega-3 Krill Oil 500 MG capsule, Take 500 mg by mouth Daily. PT TO HOLD FOR SURGERY, Disp: , Rfl:   •  HYDROcodone-acetaminophen (NORCO) 5-325 MG per tablet,  "Take 1 tablet by mouth Every 6 (Six) Hours As Needed for Severe Pain ., Disp: 15 tablet, Rfl: 0  •  Melatonin 10 MG tablet, Take 10 mg by mouth Every Night., Disp: , Rfl:   •  Multiple Vitamins-Minerals (ICAPS PO), Take 1 tablet by mouth 2 (Two) Times a Day. PT TO HOLD FOR SURGERY, Disp: , Rfl:   •  rosuvastatin (CRESTOR) 10 MG tablet, TAKE 1 TABLET BY MOUTH EVERYDAY AT BEDTIME (Patient taking differently: Take 10 mg by mouth Every Night.), Disp: 90 tablet, Rfl: 1    Review of Systems   Constitutional: Positive for fatigue. Negative for activity change, appetite change, fever, unexpected weight gain and unexpected weight loss.   HENT: Negative for congestion, ear pain, facial swelling, hearing loss, sore throat and tinnitus.    Eyes: Negative for blurred vision, double vision, redness and visual disturbance.   Respiratory: Negative for cough, shortness of breath and wheezing.    Cardiovascular: Negative for chest pain, palpitations and leg swelling.   Gastrointestinal: Negative for abdominal distention, constipation, diarrhea, nausea, vomiting, GERD and indigestion.   Endocrine: Negative for polydipsia, polyphagia and polyuria.   Genitourinary: Negative for difficulty urinating, frequency and urgency.   Musculoskeletal: Negative for back pain, gait problem and myalgias.   Skin: Negative for rash, skin lesions and wound.   Neurological: Positive for numbness (With tingling in hands and feet, burning in the feet at night). Negative for seizures, speech difficulty, weakness, headache and confusion.   Psychiatric/Behavioral: Negative for sleep disturbance, depressed mood and stress. The patient is not nervous/anxious.         Objective     Vitals:    07/07/22 1347   BP: 115/69   BP Location: Left arm   Patient Position: Sitting   Cuff Size: Adult   Pulse: 66   Temp: 98.7 °F (37.1 °C)   SpO2: 100%   Weight: 71.5 kg (157 lb 10.1 oz)   Height: 162.6 cm (64\")   PainSc:   2     Body mass index is 27.06 kg/m².    Physical " Exam  Constitutional:       Appearance: Normal appearance.      Comments: Overweight with BMI of 26.97   HENT:      Head: Normocephalic and atraumatic.      Right Ear: External ear normal.      Left Ear: External ear normal.      Nose: Nose normal.   Eyes:      Extraocular Movements: Extraocular movements intact.      Conjunctiva/sclera: Conjunctivae normal.   Pulmonary:      Effort: Pulmonary effort is normal.   Musculoskeletal:         General: Normal range of motion.      Cervical back: Normal range of motion.   Skin:     General: Skin is warm and dry.   Neurological:      General: No focal deficit present.      Mental Status: She is alert and oriented to person, place, and time. Mental status is at baseline.   Psychiatric:         Mood and Affect: Mood normal.         Behavior: Behavior normal.         Thought Content: Thought content normal.         Judgment: Judgment normal.         Result Review :   The following data was reviewed by: DULCE Serrano on 07/07/2022:    Her latest A1c was collected on 5/23/22 was 6.7% indicating controlled type 2    Most Recent A1C    HGBA1C Most Recent 5/23/22   Hemoglobin A1C 6.70 (A)   (A) Abnormal value              A1C Last 3 Results    HGBA1C Last 3 Results 11/22/21 3/2/22 5/23/22   Hemoglobin A1C 8.91 (A) 6.6 6.70 (A)   (A) Abnormal value                    Assessment: Patient's A1c remains well controlled at this time.  She denies any problematic hypoglycemia.      Diagnoses and all orders for this visit:    1. Controlled type 2 diabetes mellitus without complication, without long-term current use of insulin (HCC) (Primary)  -     glipizide (GLUCOTROL XL) 5 MG ER tablet; Take 1 tablet by mouth Daily.  Dispense: 90 tablet; Refill: 1  -     dapagliflozin-metformin HCl ER (Xigduo XR)  MG tablet; Take 1 tablet by mouth Daily.  Dispense: 90 tablet; Refill: 1    2. Overweight (BMI 25.0-29.9)        Plan: No changes were made to the treatment plan today.  The  patient be scheduled for routine follow-up appointment.    The patient will monitor her blood glucose levels 1-2 times each day.  If she develops problematic hyperglycemia or hypoglycemia or adverse drug reactions, she will contact the office for further instructions.        Follow Up     Return in about 6 months (around 1/7/2023) for Medication Management.    Patient was given instructions and counseling regarding her condition or for health maintenance advice. Please see specific information pulled into the AVS if appropriate.     Angelica Carrillo, DULCE  07/07/2022

## 2022-07-11 RX ORDER — ROSUVASTATIN CALCIUM 10 MG/1
TABLET, COATED ORAL
Qty: 90 TABLET | Refills: 1 | Status: SHIPPED | OUTPATIENT
Start: 2022-07-11 | End: 2022-11-22 | Stop reason: SDUPTHER

## 2022-07-13 ENCOUNTER — PRE-ADMISSION TESTING (OUTPATIENT)
Dept: PREADMISSION TESTING | Facility: HOSPITAL | Age: 57
End: 2022-07-13

## 2022-07-13 ENCOUNTER — HOSPITAL ENCOUNTER (OUTPATIENT)
Dept: GENERAL RADIOLOGY | Facility: HOSPITAL | Age: 57
Discharge: HOME | End: 2022-07-13

## 2022-07-13 VITALS
HEIGHT: 64 IN | OXYGEN SATURATION: 100 % | TEMPERATURE: 98.3 F | BODY MASS INDEX: 26.82 KG/M2 | DIASTOLIC BLOOD PRESSURE: 71 MMHG | RESPIRATION RATE: 16 BRPM | WEIGHT: 157.1 LBS | SYSTOLIC BLOOD PRESSURE: 106 MMHG | HEART RATE: 61 BPM

## 2022-07-13 LAB
ALBUMIN SERPL-MCNC: 4.4 G/DL (ref 3.5–5.2)
ALBUMIN/GLOB SERPL: 2 G/DL
ALP SERPL-CCNC: 85 U/L (ref 39–117)
ALT SERPL W P-5'-P-CCNC: 22 U/L (ref 1–33)
ANION GAP SERPL CALCULATED.3IONS-SCNC: 10.8 MMOL/L (ref 5–15)
AST SERPL-CCNC: 13 U/L (ref 1–32)
BACTERIA UR QL AUTO: NORMAL /HPF
BASOPHILS # BLD AUTO: 0.04 10*3/MM3 (ref 0–0.2)
BASOPHILS NFR BLD AUTO: 0.6 % (ref 0–1.5)
BILIRUB SERPL-MCNC: 0.3 MG/DL (ref 0–1.2)
BILIRUB UR QL STRIP: NEGATIVE
BUN SERPL-MCNC: 17 MG/DL (ref 6–20)
BUN/CREAT SERPL: 19.3 (ref 7–25)
CALCIUM SPEC-SCNC: 8.9 MG/DL (ref 8.6–10.5)
CHLORIDE SERPL-SCNC: 105 MMOL/L (ref 98–107)
CLARITY UR: CLEAR
CO2 SERPL-SCNC: 24.2 MMOL/L (ref 22–29)
COLOR UR: YELLOW
CREAT SERPL-MCNC: 0.88 MG/DL (ref 0.57–1)
DEPRECATED RDW RBC AUTO: 39 FL (ref 37–54)
EGFRCR SERPLBLD CKD-EPI 2021: 77.2 ML/MIN/1.73
EOSINOPHIL # BLD AUTO: 0.13 10*3/MM3 (ref 0–0.4)
EOSINOPHIL NFR BLD AUTO: 1.9 % (ref 0.3–6.2)
ERYTHROCYTE [DISTWIDTH] IN BLOOD BY AUTOMATED COUNT: 11.9 % (ref 12.3–15.4)
GLOBULIN UR ELPH-MCNC: 2.2 GM/DL
GLUCOSE SERPL-MCNC: 121 MG/DL (ref 65–99)
GLUCOSE UR STRIP-MCNC: ABNORMAL MG/DL
HCT VFR BLD AUTO: 42.8 % (ref 34–46.6)
HGB BLD-MCNC: 14.3 G/DL (ref 12–15.9)
HGB UR QL STRIP.AUTO: NEGATIVE
HYALINE CASTS UR QL AUTO: NORMAL /LPF
IMM GRANULOCYTES # BLD AUTO: 0.03 10*3/MM3 (ref 0–0.05)
IMM GRANULOCYTES NFR BLD AUTO: 0.4 % (ref 0–0.5)
KETONES UR QL STRIP: NEGATIVE
LEUKOCYTE ESTERASE UR QL STRIP.AUTO: NEGATIVE
LYMPHOCYTES # BLD AUTO: 2.27 10*3/MM3 (ref 0.7–3.1)
LYMPHOCYTES NFR BLD AUTO: 33.2 % (ref 19.6–45.3)
MCH RBC QN AUTO: 30.4 PG (ref 26.6–33)
MCHC RBC AUTO-ENTMCNC: 33.4 G/DL (ref 31.5–35.7)
MCV RBC AUTO: 91.1 FL (ref 79–97)
MONOCYTES # BLD AUTO: 0.48 10*3/MM3 (ref 0.1–0.9)
MONOCYTES NFR BLD AUTO: 7 % (ref 5–12)
NEUTROPHILS NFR BLD AUTO: 3.89 10*3/MM3 (ref 1.7–7)
NEUTROPHILS NFR BLD AUTO: 56.9 % (ref 42.7–76)
NITRITE UR QL STRIP: NEGATIVE
NRBC BLD AUTO-RTO: 0 /100 WBC (ref 0–0.2)
PH UR STRIP.AUTO: 5.5 [PH] (ref 5–8)
PLATELET # BLD AUTO: 245 10*3/MM3 (ref 140–450)
PMV BLD AUTO: 9.1 FL (ref 6–12)
POTASSIUM SERPL-SCNC: 4.3 MMOL/L (ref 3.5–5.2)
PROT SERPL-MCNC: 6.6 G/DL (ref 6–8.5)
PROT UR QL STRIP: NEGATIVE
QT INTERVAL: 415 MS
RBC # BLD AUTO: 4.7 10*6/MM3 (ref 3.77–5.28)
RBC # UR STRIP: NORMAL /HPF
REF LAB TEST METHOD: NORMAL
SARS-COV-2 ORF1AB RESP QL NAA+PROBE: NOT DETECTED
SODIUM SERPL-SCNC: 140 MMOL/L (ref 136–145)
SP GR UR STRIP: 1.01 (ref 1–1.03)
SQUAMOUS #/AREA URNS HPF: NORMAL /HPF
UROBILINOGEN UR QL STRIP: ABNORMAL
WBC # UR STRIP: NORMAL /HPF
WBC NRBC COR # BLD: 6.84 10*3/MM3 (ref 3.4–10.8)

## 2022-07-13 PROCEDURE — U0004 COV-19 TEST NON-CDC HGH THRU: HCPCS

## 2022-07-13 PROCEDURE — 93005 ELECTROCARDIOGRAM TRACING: CPT

## 2022-07-13 PROCEDURE — 85025 COMPLETE CBC W/AUTO DIFF WBC: CPT

## 2022-07-13 PROCEDURE — 93010 ELECTROCARDIOGRAM REPORT: CPT | Performed by: INTERNAL MEDICINE

## 2022-07-13 PROCEDURE — C9803 HOPD COVID-19 SPEC COLLECT: HCPCS

## 2022-07-13 PROCEDURE — 71046 X-RAY EXAM CHEST 2 VIEWS: CPT

## 2022-07-13 PROCEDURE — 36415 COLL VENOUS BLD VENIPUNCTURE: CPT

## 2022-07-13 PROCEDURE — 81001 URINALYSIS AUTO W/SCOPE: CPT

## 2022-07-13 PROCEDURE — 80053 COMPREHEN METABOLIC PANEL: CPT

## 2022-07-13 RX ORDER — PHENOL 1.4 %
10 AEROSOL, SPRAY (ML) MUCOUS MEMBRANE NIGHTLY
COMMUNITY
End: 2022-11-22

## 2022-07-13 NOTE — DISCHARGE INSTRUCTIONS
Take the following medications the morning of surgery:  NONE    THE HOSPITAL WILL CALL YOU THE DAY BEFORE WITH YOUR ARRIVAL TIME FOR SURGERY.      If you are on prescription narcotic pain medication to control your pain you may also take that medication the morning of surgery.    General Instructions:  Do not eat solid food after midnight the night before surgery.  You may drink clear liquids day of surgery but must stop at least one hour before your hospital arrival time.  It is beneficial for you to have a clear drink that contains carbohydrates the day of surgery.  We suggest a 12 to 20 ounce bottle of Gatorade or Powerade for non-diabetic patients or a 12 to 20 ounce bottle of G2 or Powerade Zero for diabetic patients. (Pediatric patients, are not advised to drink a 12 to 20 ounce carbohydrate drink)    Clear liquids are liquids you can see through.  Nothing red in color.     Plain water                               Sports drinks  Sodas                                   Gelatin (Jell-O)  Fruit juices without pulp such as white grape juice and apple juice  Popsicles that contain no fruit or yogurt  Tea or coffee (no cream or milk added)  Gatorade / Powerade  G2 / Powerade Zero    Infants may have breast milk up to four hours before surgery.  Infants drinking formula may drink formula up to six hours before surgery.   Patients who avoid smoking, chewing tobacco and alcohol for 4 weeks prior to surgery have a reduced risk of post-operative complications.  Quit smoking as many days before surgery as you can.  Do not smoke, use chewing tobacco or drink alcohol the day of surgery.   If applicable bring your C-PAP/ BI-PAP machine.  Bring any papers given to you in the doctor’s office.  Wear clean comfortable clothes.  Do not wear contact lenses, false eyelashes or make-up.  Bring a case for your glasses.   Bring crutches or walker if applicable.  Remove all piercings.  Leave jewelry and any other valuables at  home.  Hair extensions with metal clips must be removed prior to surgery.  The Pre-Admission Testing nurse will instruct you to bring medications if unable to obtain an accurate list in Pre-Admission Testing.        If you were given a blood bank ID arm band remember to bring it with you the day of surgery.    Preventing a Surgical Site Infection:  For 2 to 3 days before surgery, avoid shaving with a razor because the razor can irritate skin and make it easier to develop an infection.    Any areas of open skin can increase the risk of a post-operative wound infection by allowing bacteria to enter and travel throughout the body.  Notify your surgeon if you have any skin wounds / rashes even if it is not near the expected surgical site.  The area will need assessed to determine if surgery should be delayed until it is healed.  The night prior to surgery shower using a fresh bar of anti-bacterial soap (such as Dial) and clean washcloth.  Sleep in a clean bed with clean clothing.  Do not allow pets to sleep with you.  Shower on the morning of surgery using a fresh bar of anti-bacterial soap (such as Dial) and clean washcloth.  Dry with a clean towel and dress in clean clothing.  Ask your surgeon if you will be receiving antibiotics prior to surgery.  Make sure you, your family, and all healthcare providers clean their hands with soap and water or an alcohol based hand  before caring for you or your wound.    Day of surgery:  Your arrival time is approximately two hours before your scheduled surgery time.  Upon arrival, a Pre-op nurse and Anesthesiologist will review your health history, obtain vital signs, and answer questions you may have.  The only belongings needed at this time will be a list of your home medications and if applicable your C-PAP/BI-PAP machine.  A Pre-op nurse will start an IV and you may receive medication in preparation for surgery, including something to help you relax.     Please be  aware that surgery does come with discomfort.  We want to make every effort to control your discomfort so please discuss any uncontrolled symptoms with your nurse.   Your doctor will most likely have prescribed pain medications.      If you are going home after surgery you will receive individualized written care instructions before being discharged.  A responsible adult must drive you to and from the hospital on the day of your surgery and stay with you for 24 hours.  Discharge prescriptions can be filled by the hospital pharmacy during regular pharmacy hours.  If you are having surgery late in the day/evening your prescription may be e-prescribed to your pharmacy.  Please verify your pharmacy hours or chose a 24 hour pharmacy to avoid not having access to your prescription because your pharmacy has closed for the day.    If you are staying overnight following surgery, you will be transported to your hospital room following the recovery period.  [facility] has all private rooms.    If you have any questions please call Pre-Admission Testing at [phone].  Deductibles and co-payments are collected on the day of service. Please be prepared to pay the required co-pay, deductible or deposit on the day of service as defined by your plan.    Patient Education for Self-Quarantine Process    Following your COVID testing, we strongly recommend that you wear a mask when you are with other people and practice social distancing.   Limit your activities to only required outings.  Wash your hands with soap and water frequently for at least 20 seconds.   Avoid touching your eyes, nose and mouth with unwashed hands.  Do not share anything - utensils, drinking glasses, food from the same bowl.   Sanitize household surfaces daily. Include all high touch areas (door handles, light switches, phones, countertops, etc.)    Call your surgeon immediately if you experience any of the following symptoms:  Sore  Throat  Shortness of Breath or difficulty breathing  Cough  Chills  Body soreness or muscle pain  Headache  Fever  New loss of taste or smell  Do not arrive for your surgery ill.  Your procedure will need to be rescheduled to another time.  You will need to call your physician before the day of surgery to avoid any unnecessary exposure to hospital staff as well as other patients.

## 2022-07-14 ENCOUNTER — TRANSCRIBE ORDERS (OUTPATIENT)
Dept: ADMINISTRATIVE | Facility: HOSPITAL | Age: 57
End: 2022-07-14

## 2022-07-14 ENCOUNTER — HOSPITAL ENCOUNTER (OUTPATIENT)
Dept: CT IMAGING | Facility: HOSPITAL | Age: 57
Discharge: HOME | End: 2022-07-14
Admitting: STUDENT IN AN ORGANIZED HEALTH CARE EDUCATION/TRAINING PROGRAM

## 2022-07-14 DIAGNOSIS — R91.1 LESION OF LUNG: ICD-10-CM

## 2022-07-14 DIAGNOSIS — R91.1 LESION OF LUNG: Primary | ICD-10-CM

## 2022-07-14 PROBLEM — R22.32 SUBCUTANEOUS MASS OF LEFT THUMB: Status: ACTIVE | Noted: 2022-07-14

## 2022-07-14 PROCEDURE — 71250 CT THORAX DX C-: CPT

## 2022-07-14 NOTE — DISCHARGE INSTRUCTIONS
Orthopaedic & Hand Surgery  Discharge Instructions  Dr. Sergio Calvin  [phone]    INCISION CARE  Wash your hands prior to dressing changes  The postoperative dressings should be taken off starting on postoperative day #4. New dry dressings can then be placed around the wound and held in place with an Ace wrap. Dressings should be changed daily.  No creams or ointments to the incision until 3 weeks post op.   It is okay to wash the incision with clean water (water you would drink) and soap but do not scrub. Do not soak your incision. After after showering or washing her hands, pat the wound dry gently and cover with new dry dressings.  Do not touch or pick at the incision  Check incision every day and notify surgeon immediately if any of the following signs or symptoms are seen:  Increase in redness  Increase in swelling around the incision and of the entire extremity  Increase in pain  NEW drainage or oozing from the incision  Pulling apart of the edges of the incision  Increase in overall body temperature (greater than 100.4°F)  Your surgeon will instruct you regarding suture or staple removal. In general, this happens at the 1-2-week post op appointment.    ACTIVITIES  Exercises:  Physical therapy may or may not be needed after surgery. Once some healing has occurred, it will be possible to start therapy if you wish. However, most patients get their function back fairly well after surgery without formal therapy.  Activities of Daily Living:   Showering may begin immediately if the wrist can be protected. The splint and incision cannot get wet after surgery.   No tub baths, hot tubs, or swimming pools for 4 weeks.  May shower and let water run over the incision once the sutures are removed if there is no drainage and the wound is well healing. A new dry dressing can be applied after showering.     Restrictions  Weight: Do not put weight on the wrist until instructed to do so. Your surgeon will discuss  with restrictions in terms of activities allowed. In general, anything more strenuous than holding a pen or piece of paper should be avoided, the other wrist should do the vast majority of the work until the soft tissues have healed enough that it is not painful, then it is ok to use the wrist more strenuously.   Driving: Many patients have questions about when it is safe to return to driving. The answer is that this is extremely variable. It depends on how quickly you heal. Until you can safely navigate the steering wheel, and are off of all narcotics, driving is not permitted. Your surgeon cannot “clear” you to return to driving, only you can make the decision when you feel it is safe.     Pain Control  Ice:  Ice is an excellent pain reliever. This can be used regardless of whether or not you are taking pain medication.  Apply an ice pack to the surgical area for 20 minutes at a time, removing it for at least an hour to prevent frostbite.  You should keep a towel between any dressings on the ice pack to prevent them from getting damp and from developing frostbite on the operative site.  Elevation:  Elevation is another easy way to control pain after surgery. Whenever possible, keep the operative limb elevated above the level of your heart to reduce swelling.  Acetaminophen (Tylenol):  CLASSIFICATION: A non-narcotic medication that is available without a prescription.  Acetaminophen controls pain but does not affect swelling or inflammation.  DRIVING: Acetaminophen will not impair your ability to drive. It is safe to drive while taking if your physical condition does not limit you.  POTENTIAL SIDE EFFECTS: nausea, stomach upset, liver failure if taken in large doses.  Interactions: Some narcotic medications contain acetaminophen. If you have a narcotic prescription, make sure to cut back on the acetaminophen if you are taking the narcotic. You should never take more 3000 mg of acetaminophen in one 24-hour  period.  DOSAGE:  Following surgery, you may take two regular strength (325 mg) tabs to control pain every 6 hours. This can be taken with NSAIDs (see below) or alternating the two.  After the initial surgical pain begins to resolve, you may begin to decrease the pain medication. By the end of a few weeks, you should be off of pain medications.  NSAIDS: This includes aspirin, ibuprofen, naproxen, Motrin, Aleve, Mobic, Celebrex  CLASSIFICATION: These are non-narcotic medications that are available without a prescription.  They are particularly effective at reducing swelling and inflammation  DRIVING: These medications will not impair your ability to drive. It is safe to drive while taking these medications if your physical condition does not limit you.  POTENTIAL SIDE EFFECTS: nausea, stomach upset, ulcer, gastric bleeding, kidney failure.  DOSAGE:  Following surgery, you may take ibuprofen (Motrin) 600 mg to control pain every 6 hours with food. It helps to take it scheduled (around the clock) to allow it to help reduce swelling.  After the initial surgical pain begins to resolve, you may begin to decrease the pain medication. By the end of a few weeks, you should be off of pain medications.    Medications  Anticoagulants: After upper extremity surgery most patients do not require an anticoagulant unless you have another injury that will be keeping you from mobilizing.  If you were already taking an anticoagulant (commonly Aspirin, Coumadin, or Plavix) you will likely be resuming your normal dose postoperatively and will be continuing that medication at the discretion of the prescribing physician.    FOLLOW-UP VISITS  You will need to follow up in the office with your surgeon in 1-2 weeks, or as instructed elsewhere in your discharge paperwork. Please call this number [phone] to schedule this appointment if one has not already been made.  If you have any concerns or suspected complications prior to your follow  up visit, please call the office. Do not wait until your appointment time if you suspect complications. These will need to be addressed in the office promptly.      Sergio Calvin MD, PhD  Orthopaedic Surgery  [facility]

## 2022-07-15 ENCOUNTER — APPOINTMENT (OUTPATIENT)
Dept: DIABETES SERVICES | Facility: HOSPITAL | Age: 57
End: 2022-07-15

## 2022-07-15 ENCOUNTER — ANESTHESIA (OUTPATIENT)
Dept: PERIOP | Facility: HOSPITAL | Age: 57
End: 2022-07-15

## 2022-07-15 ENCOUNTER — ANESTHESIA EVENT (OUTPATIENT)
Dept: PERIOP | Facility: HOSPITAL | Age: 57
End: 2022-07-15

## 2022-07-15 ENCOUNTER — HOSPITAL ENCOUNTER (OUTPATIENT)
Facility: HOSPITAL | Age: 57
Setting detail: HOSPITAL OUTPATIENT SURGERY
Discharge: HOME | End: 2022-07-15
Attending: STUDENT IN AN ORGANIZED HEALTH CARE EDUCATION/TRAINING PROGRAM | Admitting: STUDENT IN AN ORGANIZED HEALTH CARE EDUCATION/TRAINING PROGRAM

## 2022-07-15 VITALS
TEMPERATURE: 97.5 F | SYSTOLIC BLOOD PRESSURE: 124 MMHG | DIASTOLIC BLOOD PRESSURE: 76 MMHG | RESPIRATION RATE: 18 BRPM | OXYGEN SATURATION: 100 % | HEART RATE: 68 BPM

## 2022-07-15 DIAGNOSIS — R22.32 SUBCUTANEOUS MASS OF LEFT THUMB: Primary | ICD-10-CM

## 2022-07-15 DIAGNOSIS — R22.32 MASS OF SKIN OF LEFT THUMB: ICD-10-CM

## 2022-07-15 LAB — GLUCOSE BLDC GLUCOMTR-MCNC: 106 MG/DL (ref 70–130)

## 2022-07-15 PROCEDURE — 25010000002 PROPOFOL 10 MG/ML EMULSION

## 2022-07-15 PROCEDURE — 82962 GLUCOSE BLOOD TEST: CPT

## 2022-07-15 PROCEDURE — 25010000002 FENTANYL CITRATE (PF) 50 MCG/ML SOLUTION

## 2022-07-15 PROCEDURE — 88307 TISSUE EXAM BY PATHOLOGIST: CPT | Performed by: STUDENT IN AN ORGANIZED HEALTH CARE EDUCATION/TRAINING PROGRAM

## 2022-07-15 PROCEDURE — 25010000002 CEFAZOLIN IN DEXTROSE 2-4 GM/100ML-% SOLUTION: Performed by: STUDENT IN AN ORGANIZED HEALTH CARE EDUCATION/TRAINING PROGRAM

## 2022-07-15 PROCEDURE — 25010000002 DEXAMETHASONE PER 1 MG

## 2022-07-15 RX ORDER — EPHEDRINE SULFATE 50 MG/ML
5 INJECTION, SOLUTION INTRAVENOUS ONCE AS NEEDED
Status: DISCONTINUED | OUTPATIENT
Start: 2022-07-15 | End: 2022-07-15 | Stop reason: HOSPADM

## 2022-07-15 RX ORDER — DEXAMETHASONE SODIUM PHOSPHATE 10 MG/ML
INJECTION INTRAMUSCULAR; INTRAVENOUS AS NEEDED
Status: DISCONTINUED | OUTPATIENT
Start: 2022-07-15 | End: 2022-07-15 | Stop reason: SURG

## 2022-07-15 RX ORDER — GLYCOPYRROLATE 0.2 MG/ML
INJECTION INTRAMUSCULAR; INTRAVENOUS AS NEEDED
Status: DISCONTINUED | OUTPATIENT
Start: 2022-07-15 | End: 2022-07-15 | Stop reason: SURG

## 2022-07-15 RX ORDER — FENTANYL CITRATE 50 UG/ML
50 INJECTION, SOLUTION INTRAMUSCULAR; INTRAVENOUS
Status: DISCONTINUED | OUTPATIENT
Start: 2022-07-15 | End: 2022-07-15 | Stop reason: HOSPADM

## 2022-07-15 RX ORDER — SODIUM CHLORIDE, SODIUM LACTATE, POTASSIUM CHLORIDE, CALCIUM CHLORIDE 600; 310; 30; 20 MG/100ML; MG/100ML; MG/100ML; MG/100ML
9 INJECTION, SOLUTION INTRAVENOUS CONTINUOUS
Status: DISCONTINUED | OUTPATIENT
Start: 2022-07-15 | End: 2022-07-15 | Stop reason: HOSPADM

## 2022-07-15 RX ORDER — OXYCODONE AND ACETAMINOPHEN 7.5; 325 MG/1; MG/1
1 TABLET ORAL EVERY 4 HOURS PRN
Status: DISCONTINUED | OUTPATIENT
Start: 2022-07-15 | End: 2022-07-15 | Stop reason: HOSPADM

## 2022-07-15 RX ORDER — LIDOCAINE HYDROCHLORIDE 20 MG/ML
INJECTION, SOLUTION INFILTRATION; PERINEURAL AS NEEDED
Status: DISCONTINUED | OUTPATIENT
Start: 2022-07-15 | End: 2022-07-15 | Stop reason: SURG

## 2022-07-15 RX ORDER — HYDRALAZINE HYDROCHLORIDE 20 MG/ML
5 INJECTION INTRAMUSCULAR; INTRAVENOUS
Status: DISCONTINUED | OUTPATIENT
Start: 2022-07-15 | End: 2022-07-15 | Stop reason: HOSPADM

## 2022-07-15 RX ORDER — MAGNESIUM HYDROXIDE 1200 MG/15ML
LIQUID ORAL AS NEEDED
Status: DISCONTINUED | OUTPATIENT
Start: 2022-07-15 | End: 2022-07-15 | Stop reason: HOSPADM

## 2022-07-15 RX ORDER — HYDROCODONE BITARTRATE AND ACETAMINOPHEN 7.5; 325 MG/1; MG/1
1 TABLET ORAL ONCE AS NEEDED
Status: DISCONTINUED | OUTPATIENT
Start: 2022-07-15 | End: 2022-07-15 | Stop reason: HOSPADM

## 2022-07-15 RX ORDER — LIDOCAINE HYDROCHLORIDE 10 MG/ML
0.5 INJECTION, SOLUTION EPIDURAL; INFILTRATION; INTRACAUDAL; PERINEURAL ONCE AS NEEDED
Status: DISCONTINUED | OUTPATIENT
Start: 2022-07-15 | End: 2022-07-15 | Stop reason: HOSPADM

## 2022-07-15 RX ORDER — IBUPROFEN 600 MG/1
600 TABLET ORAL ONCE AS NEEDED
Status: DISCONTINUED | OUTPATIENT
Start: 2022-07-15 | End: 2022-07-15 | Stop reason: HOSPADM

## 2022-07-15 RX ORDER — CEFAZOLIN SODIUM 2 G/100ML
2 INJECTION, SOLUTION INTRAVENOUS ONCE
Status: COMPLETED | OUTPATIENT
Start: 2022-07-15 | End: 2022-07-15

## 2022-07-15 RX ORDER — FAMOTIDINE 10 MG/ML
20 INJECTION, SOLUTION INTRAVENOUS ONCE
Status: COMPLETED | OUTPATIENT
Start: 2022-07-15 | End: 2022-07-15

## 2022-07-15 RX ORDER — PROMETHAZINE HYDROCHLORIDE 25 MG/1
25 TABLET ORAL ONCE AS NEEDED
Status: DISCONTINUED | OUTPATIENT
Start: 2022-07-15 | End: 2022-07-15 | Stop reason: HOSPADM

## 2022-07-15 RX ORDER — LIDOCAINE HYDROCHLORIDE AND EPINEPHRINE 10; 10 MG/ML; UG/ML
INJECTION, SOLUTION INFILTRATION; PERINEURAL AS NEEDED
Status: DISCONTINUED | OUTPATIENT
Start: 2022-07-15 | End: 2022-07-15 | Stop reason: HOSPADM

## 2022-07-15 RX ORDER — MIDAZOLAM HYDROCHLORIDE 1 MG/ML
1 INJECTION INTRAMUSCULAR; INTRAVENOUS
Status: DISCONTINUED | OUTPATIENT
Start: 2022-07-15 | End: 2022-07-15 | Stop reason: HOSPADM

## 2022-07-15 RX ORDER — DIPHENHYDRAMINE HCL 25 MG
25 CAPSULE ORAL
Status: DISCONTINUED | OUTPATIENT
Start: 2022-07-15 | End: 2022-07-15 | Stop reason: HOSPADM

## 2022-07-15 RX ORDER — ONDANSETRON 2 MG/ML
4 INJECTION INTRAMUSCULAR; INTRAVENOUS ONCE AS NEEDED
Status: DISCONTINUED | OUTPATIENT
Start: 2022-07-15 | End: 2022-07-15 | Stop reason: HOSPADM

## 2022-07-15 RX ORDER — HYDROMORPHONE HYDROCHLORIDE 1 MG/ML
0.5 INJECTION, SOLUTION INTRAMUSCULAR; INTRAVENOUS; SUBCUTANEOUS
Status: DISCONTINUED | OUTPATIENT
Start: 2022-07-15 | End: 2022-07-15 | Stop reason: HOSPADM

## 2022-07-15 RX ORDER — DIPHENHYDRAMINE HYDROCHLORIDE 50 MG/ML
12.5 INJECTION INTRAMUSCULAR; INTRAVENOUS
Status: DISCONTINUED | OUTPATIENT
Start: 2022-07-15 | End: 2022-07-15 | Stop reason: HOSPADM

## 2022-07-15 RX ORDER — HYDROCODONE BITARTRATE AND ACETAMINOPHEN 5; 325 MG/1; MG/1
1 TABLET ORAL EVERY 6 HOURS PRN
Qty: 15 TABLET | Refills: 0 | Status: SHIPPED | OUTPATIENT
Start: 2022-07-15 | End: 2022-10-24

## 2022-07-15 RX ORDER — PROMETHAZINE HYDROCHLORIDE 25 MG/1
25 SUPPOSITORY RECTAL ONCE AS NEEDED
Status: DISCONTINUED | OUTPATIENT
Start: 2022-07-15 | End: 2022-07-15 | Stop reason: HOSPADM

## 2022-07-15 RX ORDER — LABETALOL HYDROCHLORIDE 5 MG/ML
5 INJECTION, SOLUTION INTRAVENOUS
Status: DISCONTINUED | OUTPATIENT
Start: 2022-07-15 | End: 2022-07-15 | Stop reason: HOSPADM

## 2022-07-15 RX ORDER — NALOXONE HCL 0.4 MG/ML
0.2 VIAL (ML) INJECTION AS NEEDED
Status: DISCONTINUED | OUTPATIENT
Start: 2022-07-15 | End: 2022-07-15 | Stop reason: HOSPADM

## 2022-07-15 RX ORDER — FLUMAZENIL 0.1 MG/ML
0.2 INJECTION INTRAVENOUS AS NEEDED
Status: DISCONTINUED | OUTPATIENT
Start: 2022-07-15 | End: 2022-07-15 | Stop reason: HOSPADM

## 2022-07-15 RX ORDER — PROPOFOL 10 MG/ML
VIAL (ML) INTRAVENOUS AS NEEDED
Status: DISCONTINUED | OUTPATIENT
Start: 2022-07-15 | End: 2022-07-15 | Stop reason: SURG

## 2022-07-15 RX ORDER — SODIUM CHLORIDE 0.9 % (FLUSH) 0.9 %
3-10 SYRINGE (ML) INJECTION AS NEEDED
Status: DISCONTINUED | OUTPATIENT
Start: 2022-07-15 | End: 2022-07-15 | Stop reason: HOSPADM

## 2022-07-15 RX ORDER — FENTANYL CITRATE 50 UG/ML
INJECTION, SOLUTION INTRAMUSCULAR; INTRAVENOUS AS NEEDED
Status: DISCONTINUED | OUTPATIENT
Start: 2022-07-15 | End: 2022-07-15 | Stop reason: SURG

## 2022-07-15 RX ORDER — SODIUM CHLORIDE 0.9 % (FLUSH) 0.9 %
3 SYRINGE (ML) INJECTION EVERY 12 HOURS SCHEDULED
Status: DISCONTINUED | OUTPATIENT
Start: 2022-07-15 | End: 2022-07-15 | Stop reason: HOSPADM

## 2022-07-15 RX ADMIN — CEFAZOLIN SODIUM 2 G: 2 INJECTION, SOLUTION INTRAVENOUS at 11:37

## 2022-07-15 RX ADMIN — FENTANYL CITRATE 25 MCG: 50 INJECTION INTRAMUSCULAR; INTRAVENOUS at 12:17

## 2022-07-15 RX ADMIN — LIDOCAINE HYDROCHLORIDE 70 MG: 20 INJECTION, SOLUTION INFILTRATION; PERINEURAL at 11:49

## 2022-07-15 RX ADMIN — SODIUM CHLORIDE, POTASSIUM CHLORIDE, SODIUM LACTATE AND CALCIUM CHLORIDE 9 ML/HR: 600; 310; 30; 20 INJECTION, SOLUTION INTRAVENOUS at 08:06

## 2022-07-15 RX ADMIN — PROPOFOL 50 MG: 10 INJECTION, EMULSION INTRAVENOUS at 11:56

## 2022-07-15 RX ADMIN — PROPOFOL 150 MG: 10 INJECTION, EMULSION INTRAVENOUS at 11:49

## 2022-07-15 RX ADMIN — FAMOTIDINE 20 MG: 10 INJECTION INTRAVENOUS at 08:06

## 2022-07-15 RX ADMIN — FENTANYL CITRATE 25 MCG: 50 INJECTION INTRAMUSCULAR; INTRAVENOUS at 12:59

## 2022-07-15 RX ADMIN — GLYCOPYRROLATE 0.2 MG: 0.2 INJECTION INTRAMUSCULAR; INTRAVENOUS at 11:49

## 2022-07-15 RX ADMIN — FENTANYL CITRATE 50 MCG: 50 INJECTION INTRAMUSCULAR; INTRAVENOUS at 11:49

## 2022-07-15 RX ADMIN — DEXAMETHASONE SODIUM PHOSPHATE 8 MG: 10 INJECTION INTRAMUSCULAR; INTRAVENOUS at 11:55

## 2022-07-15 NOTE — ANESTHESIA PREPROCEDURE EVALUATION
Anesthesia Evaluation     Patient summary reviewed   no history of anesthetic complications:  NPO Solid Status: > 8 hours  NPO Liquid Status: > 2 hours           Airway   Mallampati: II  TM distance: >3 FB  Neck ROM: full  No difficulty expected  Dental - normal exam     Pulmonary     breath sounds clear to auscultation  (-) shortness of breath, recent URI, not a smoker  Cardiovascular   Exercise tolerance: excellent (>7 METS)    Rhythm: regular  Rate: normal    (+) hypertension, hyperlipidemia,   (-) past MI, dysrhythmias, angina      Neuro/Psych  (-) seizures, CVA  GI/Hepatic/Renal/Endo    (+)  GERD,  diabetes mellitus,   (-)  obesity, no renal disease    Musculoskeletal     (-) neck stiffness      ROS comment: 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    Plan discussed with CRNA.

## 2022-07-15 NOTE — ADDENDUM NOTE
Addendum  created 07/15/22 1431 by Steven Wang DO    Attestation recorded in Intraprocedure, Intraprocedure Attestations filed

## 2022-07-15 NOTE — INTERVAL H&P NOTE
H&P reviewed. The patient was examined and there are no changes to the H&P.    Sergio Calvin MD, PhD  Orthopaedic & Hand Surgery  [facility]  [phone] - [place] [facility]  [phone] - [facility]

## 2022-07-15 NOTE — OP NOTE
Orthopaedic & Hand Surgery  Thumb mass excision Operative Report  Dr. Sergio Calvin  [phone]      PATIENT NAME: Amanda Cason  MRN: [id number]  : 1965 AGE: 56 y.o. 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 She requested excision. The risks of surgery were discussed and included Pain, Bleeding, Infection, Complications of anesthesia, Damage to blood vessels, nerves and other surrounding structures, Stiffness, Scar, Incomplete resolution or recurrence of the condition, Further procedures and Unforeseen risks of surgery including stroke, heart stoppage or death. No guarantees were made. Following a thorough discussion, questions were solicited and answered to her satisfaction. Verbal and written consent were obtained prior to proceeding with surgery.    COMPONENTS:   • None    SPECIMENS:   • Butts well encapsulated rubbery mass approximately 6 mm x 4 mm x 4 mm.    PERTINENT FINDINGS:   • Ulnar digital nerve identified and protected  • Mass appears to arise from the tendon sheath near the level of the IP joint    TOURNIQUET TIME:   • 14 minutes    DETAILS OF PROCEDURE:  The patient was met in the preoperative area. The site was marked. The consent and H&P were reviewed. The patient was then transferred to the operative suite and placed supine on the operative table. The patient submitted to anesthesia. A tourniquet was placed on the left upper arm  arm. Surgical alcohol was used to thoroughly clean the entire operative extremity. The arm was then prepped in the normal sterile fashion, which included Chloroprep and multiple layers of sterile drapes.     A surgical timeout was taken to verify the patient's identity, the surgical side/site, planned procedure and the administration of preoperative antibiotics. Bipolar cautery and 3.5-power loupe magnification were used.     The limb was exsanguinated proximal to the thumb mass with an Ace bandage and the tourniquet was inflated.    A Natasha incision was designed with the base radially centered over the IP joint of the thumb. Skin was incised sharply with a 15 blade and dissection was carried down through subcutaneous tissues using tenotomy scissors. The ulnar digital nerve was identified. This was protected throughout the remainder of the case. The mass was identified protruding dorsal to the digital nerve at the level of the IP joint. It was well encapsulated and had a tannish quality. Care was taken to  from subcutaneous tissues. It was traced down to the level of the flexor tendon sheath. It was sharply excised and removed en bloc.    Subcutaneous tissues were then infiltrated with lidocaine containing epinephrine for hemostasis purposes and digital block was performed. Wound was then irrigated and the tourniquet was deflated at 14 minutes. Hemostasis was achieved using direct pressure for approximately 5 minutes. Once the wound was found to be satisfactory dry, the wound was again irrigated and the skin was closed with vertical mattress Prolene suture over vessel loop pledget. A sterile bulky dressing was applied and affixed with an Ace wrap.    Anesthesia was reversed without complication, the patient was transferred to the [facility] and taken to the recovery room in stable condition. Sponge and needle count were reported to me as correct. There were no complications. The patient tolerated the procedure  well.    Post Operative Plan:   • Follow-up surgical pathology.  • Pain control consisting of Tylenol ibuprofen with judicious use of narcotics for the first couple postoperative days.  • Follow-up in office in 10 to 14 days for suture removal.    Sergio Calvin MD, PhD  Orthopaedic & Hand Surgery  [facility]  [phone] - [place] [facility]  [phone] - [facility]

## 2022-07-15 NOTE — ANESTHESIA POSTPROCEDURE EVALUATION
Patient: Amanda Cason    Procedure Summary     Date: 07/15/22 Room / Location:  [facility] [address] [place] [address] [facility]    Anesthesia Start: 1141 Anesthesia Stop: 1302    Procedure: LEFT THUMB VOLAR MASS EXCISION (Left Wrist) Diagnosis:     Surgeons: Sergio Calvin MD Provider: Steven Wang DO    Anesthesia Type: general ASA Status: 2          Anesthesia Type: general    Vitals  Vitals Value Taken Time   /72 07/15/22 1345   Temp 36.4 °C (97.5 °F) 07/15/22 1350   Pulse 71 07/15/22 1350   Resp 18 07/15/22 1345   SpO2 99 % 07/15/22 1349   Vitals shown include unvalidated device data.        Post Anesthesia Care and Evaluation    Patient location during evaluation: bedside  Patient participation: complete - patient participated  Level of consciousness: awake  Pain management: adequate    Airway patency: patent  Anesthetic complications: No anesthetic complications    Cardiovascular status: acceptable  Respiratory status: acceptable  Hydration status: acceptable    Comments: */76 (BP Location: Left arm, Patient Position: Sitting)   Pulse 68   Temp 36.4 °C (97.5 °F) (Temporal)   Resp 18   SpO2 100%

## 2022-07-15 NOTE — ANESTHESIA PROCEDURE NOTES
Airway  Urgency: elective    Date/Time: 7/15/2022 11:50 AM    General Information and Staff    Patient location during procedure: OR  Anesthesiologist: Steven Wang DO  CRNA/CAA: Katiuska Anthony CRNA    Indications and Patient Condition  Indications for airway management: airway protection    Preoxygenated: yes  MILS not maintained throughout  Mask difficulty assessment: 0 - not attempted    Final Airway Details  Final airway type: supraglottic airway      Successful airway: unique  Size 4    Number of attempts at approach: 1  Assessment: lips, teeth, and gum same as pre-op and atraumatic intubation

## 2022-07-16 LAB
LAB AP CASE REPORT: NORMAL
PATH REPORT.FINAL DX SPEC: NORMAL
PATH REPORT.GROSS SPEC: NORMAL

## 2022-08-24 ENCOUNTER — TELEPHONE (OUTPATIENT)
Dept: FAMILY MEDICINE CLINIC | Facility: CLINIC | Age: 57
End: 2022-08-24

## 2022-08-24 DIAGNOSIS — Z12.31 ENCOUNTER FOR SCREENING MAMMOGRAM FOR MALIGNANT NEOPLASM OF BREAST: Primary | ICD-10-CM

## 2022-08-24 NOTE — TELEPHONE ENCOUNTER
Caller: Amanda Cason    Relationship: Self    Best call back number: [phone]     What orders are you requesting (i.e. lab or imaging): MAMMOGRAM     In what timeframe would the patient need to come in: AS SOON AS POSSIBLE     Where will you receive your lab/imaging services: [facility]     Additional notes: PLEASE CALL AND ADVISE.

## 2022-10-07 RX ORDER — LISINOPRIL 2.5 MG/1
2.5 TABLET ORAL DAILY
Qty: 90 TABLET | Refills: 1 | Status: SHIPPED | OUTPATIENT
Start: 2022-10-07 | End: 2023-04-06

## 2022-10-19 ENCOUNTER — HOSPITAL ENCOUNTER (OUTPATIENT)
Dept: MAMMOGRAPHY | Facility: HOSPITAL | Age: 57
Discharge: HOME | End: 2022-10-19
Admitting: NURSE PRACTITIONER

## 2022-10-19 DIAGNOSIS — Z12.31 ENCOUNTER FOR SCREENING MAMMOGRAM FOR MALIGNANT NEOPLASM OF BREAST: ICD-10-CM

## 2022-10-19 PROCEDURE — 77067 SCR MAMMO BI INCL CAD: CPT

## 2022-10-19 PROCEDURE — 77063 BREAST TOMOSYNTHESIS BI: CPT

## 2022-10-24 ENCOUNTER — OFFICE VISIT (OUTPATIENT)
Dept: FAMILY MEDICINE CLINIC | Facility: CLINIC | Age: 57
End: 2022-10-24

## 2022-10-24 VITALS
OXYGEN SATURATION: 98 % | DIASTOLIC BLOOD PRESSURE: 65 MMHG | TEMPERATURE: 99.1 F | SYSTOLIC BLOOD PRESSURE: 106 MMHG | BODY MASS INDEX: 26.8 KG/M2 | HEART RATE: 78 BPM | WEIGHT: 157 LBS | HEIGHT: 64 IN

## 2022-10-24 DIAGNOSIS — J02.9 SORE THROAT: ICD-10-CM

## 2022-10-24 DIAGNOSIS — U07.1 COVID: Primary | ICD-10-CM

## 2022-10-24 DIAGNOSIS — R51.9 SINUS HEADACHE: ICD-10-CM

## 2022-10-24 DIAGNOSIS — R05.8 PRODUCTIVE COUGH: ICD-10-CM

## 2022-10-24 LAB
EXPIRATION DATE: ABNORMAL
EXPIRATION DATE: NORMAL
FLUAV AG UPPER RESP QL IA.RAPID: NOT DETECTED
FLUBV AG UPPER RESP QL IA.RAPID: NOT DETECTED
INTERNAL CONTROL: ABNORMAL
INTERNAL CONTROL: NORMAL
Lab: ABNORMAL
Lab: NORMAL
S PYO AG THROAT QL: NEGATIVE
SARS-COV-2 AG UPPER RESP QL IA.RAPID: DETECTED

## 2022-10-24 PROCEDURE — 87428 SARSCOV & INF VIR A&B AG IA: CPT | Performed by: NURSE PRACTITIONER

## 2022-10-24 PROCEDURE — 87880 STREP A ASSAY W/OPTIC: CPT | Performed by: NURSE PRACTITIONER

## 2022-10-24 PROCEDURE — 99213 OFFICE O/P EST LOW 20 MIN: CPT | Performed by: NURSE PRACTITIONER

## 2022-10-24 RX ORDER — BENZONATATE 100 MG/1
100 CAPSULE ORAL 3 TIMES DAILY PRN
Qty: 24 CAPSULE | Refills: 0 | Status: SHIPPED | OUTPATIENT
Start: 2022-10-24 | End: 2022-11-22

## 2022-10-24 NOTE — PROGRESS NOTES
Chief Complaint  Headache, Sore Throat (/), Cough, and Sinus Problem    Subjective            Amanda Cason presents to [facility]  History of Present Illness  Pt has c/o productive cough, H/A, and sore throat since Saturday. Pt states she has been taking OTC cold medicine with no relief. Pt states her spouse was sick last week. Pt denies any fever, CP, or SOA.         Past Medical History:   Diagnosis Date   • Acid reflux    • Arthritis     NECK, HANDS   • Deafness in left ear    • Diabetes mellitus type 2, noninsulin dependent (HCC)    • Diabetic eye exam ([facility]) 06/23/2015    Last completed-Saulo Siu   • Dry eye syndrome    • Encounter for diabetic foot exam ([facility]) 11/20/2019    last completed- Done in office durning visit   • GERD (gastroesophageal reflux disease) 02/12/2018   • Glaucoma    • Hyperlipidemia    • Pap smear for cervical cancer screening 08/30/2017   • Subcutaneous mass of left thumb    • Vaginal Pap smear 08/20/2017    For cancer screening   • Visit for screening mammogram 11/20/2019    Declined       No Known Allergies     Past Surgical History:   Procedure Laterality Date   • APPENDECTOMY      Childhood   • COLONOSCOPY N/A 11/04/2021    Procedure: COLONOSCOPY with polypectomy.;  Surgeon: Silvano Garcia MD;  Location: [facility] ENDOSCOPY;  Service: General;  Laterality: N/A;  Transvers polyp   • ENDOMETRIAL ABLATION     • INCISION AND DRAINAGE OF WOUND Left 7/15/2022    Procedure: LEFT THUMB VOLAR MASS EXCISION;  Surgeon: Sergio Calvin MD;  Location: [facility];  Service: Orthopedics;  Laterality: Left;   • OTHER SURGICAL HISTORY Left     Ear Surgery  - x3 hole in ear   • ROTATOR CUFF REPAIR Left    • SHOULDER LIGAMENT REPAIR Right    • TUBAL ABDOMINAL LIGATION  1986        Social History     Tobacco Use   • Smoking status: Never   • Smokeless tobacco: Never   Substance Use Topics   • Alcohol use: Never       Family History   Problem Relation  "Age of Onset   • Diabetes Mother         mellitus   • Breast cancer Mother    • Diabetes type II Mother         Mellitus   • Heart disease Father    • Diabetes Sister         Mellitus   • Diabetes Brother         Mellitus   • Malig Hyperthermia Neg Hx         Current Outpatient Medications on File Prior to Visit   Medication Sig   • ascorbic acid (VITAMIN C) 1000 MG tablet Take 1,000 mg by mouth Daily.   • dapagliflozin-metformin HCl ER (Xigduo XR)  MG tablet Take 1 tablet by mouth Daily.   • glipizide (GLUCOTROL XL) 5 MG ER tablet Take 1 tablet by mouth Daily.   • lisinopril (PRINIVIL,ZESTRIL) 2.5 MG tablet Take 1 tablet by mouth Daily.   • Melatonin 10 MG tablet Take 10 mg by mouth Every Night.   • Multiple Vitamins-Minerals (ICAPS PO) Take 1 tablet by mouth 2 (Two) Times a Day. PT TO HOLD FOR SURGERY   • Omega-3 Krill Oil 500 MG capsule Take 500 mg by mouth Daily. PT TO HOLD FOR SURGERY   • rosuvastatin (CRESTOR) 10 MG tablet TAKE 1 TABLET BY MOUTH EVERYDAY AT BEDTIME (Patient taking differently: Take 1 tablet by mouth Every Night.)   • [DISCONTINUED] HYDROcodone-acetaminophen (NORCO) 5-325 MG per tablet Take 1 tablet by mouth Every 6 (Six) Hours As Needed for Severe Pain .     No current facility-administered medications on file prior to visit.       Health Maintenance Due   Topic Date Due   • Hepatitis B (1 of 3 - 3-dose series) Never done   • COVID-19 Vaccine (1) Never done   • ANNUAL PHYSICAL  Never done   • Pneumococcal Vaccine 0-64 (1 - PCV) Never done   • TDAP/TD VACCINES (1 - Tdap) Never done   • ZOSTER VACCINE (1 of 2) Never done   • DIABETIC FOOT EXAM  06/15/2021   • DIABETIC EYE EXAM  06/15/2021   • INFLUENZA VACCINE  Never done       Objective     /65   Pulse 78   Temp 99.1 °F (37.3 °C)   Ht 162.6 cm (64\")   Wt 71.2 kg (157 lb)   SpO2 98%   BMI 26.95 kg/m²       Physical Exam  Constitutional:       General: She is not in acute distress.     Appearance: Normal appearance. She is not " ill-appearing.   HENT:      Head: Normocephalic and atraumatic.      Comments: Maxillary sinus tenderness upon palpation     Right Ear: Tympanic membrane, ear canal and external ear normal.      Left Ear: Tympanic membrane, ear canal and external ear normal.      Nose: Nose normal.      Mouth/Throat:      Lips: Pink.      Mouth: Mucous membranes are moist.      Pharynx: Posterior oropharyngeal erythema present. No oropharyngeal exudate or uvula swelling.   Cardiovascular:      Rate and Rhythm: Normal rate and regular rhythm.      Heart sounds: Normal heart sounds. No murmur heard.  Pulmonary:      Effort: Pulmonary effort is normal. No respiratory distress.      Breath sounds: Normal breath sounds.   Chest:      Chest wall: No tenderness.   Abdominal:      General: There is no distension.      Palpations: There is no mass.      Tenderness: There is no abdominal tenderness. There is no guarding.   Musculoskeletal:         General: No swelling or tenderness. Normal range of motion.      Cervical back: Normal range of motion and neck supple.   Skin:     General: Skin is warm and dry.      Findings: No rash.   Neurological:      General: No focal deficit present.      Mental Status: She is alert and oriented to person, place, and time. Mental status is at baseline.      Gait: Gait normal.   Psychiatric:         Mood and Affect: Mood normal.         Behavior: Behavior normal.         Thought Content: Thought content normal.         Judgment: Judgment normal.           Result Review :                           Assessment and Plan        Diagnoses and all orders for this visit:    1. COVID (Primary)  Comments:  advised to rest and push fluids, go to ER with any trouble breathing or SOA  Orders:  -     benzonatate (Tessalon Perles) 100 MG capsule; Take 1 capsule by mouth 3 (Three) Times a Day As Needed for Cough.  Dispense: 24 capsule; Refill: 0    2. Productive cough  -     POCT SARS-CoV-2 Antigen HODA + Flu  -     POCT  rapid strep A  -     benzonatate (Tessalon Perles) 100 MG capsule; Take 1 capsule by mouth 3 (Three) Times a Day As Needed for Cough.  Dispense: 24 capsule; Refill: 0    3. Sinus headache  Comments:  advised Tylenol q 6 hrs prn  Orders:  -     POCT SARS-CoV-2 Antigen HODA + Flu  -     POCT rapid strep A    4. Sore throat  Comments:  advised cold beverages, popcycles to soothe throat, tylenol prn  Orders:  -     POCT SARS-CoV-2 Antigen HODA + Flu  -     POCT rapid strep A              Follow Up     Return if symptoms worsen or fail to improve.    Patient was given instructions and counseling regarding her condition or for health maintenance advice. Please see specific information pulled into the AVS if appropriate.     Amanda Cason  reports that she has never smoked. She has never used smokeless tobacco..

## 2022-11-22 ENCOUNTER — LAB (OUTPATIENT)
Dept: LAB | Facility: HOSPITAL | Age: 57
End: 2022-11-22

## 2022-11-22 ENCOUNTER — OFFICE VISIT (OUTPATIENT)
Dept: FAMILY MEDICINE CLINIC | Facility: CLINIC | Age: 57
End: 2022-11-22

## 2022-11-22 VITALS
WEIGHT: 155 LBS | HEIGHT: 64 IN | HEART RATE: 64 BPM | BODY MASS INDEX: 26.46 KG/M2 | SYSTOLIC BLOOD PRESSURE: 120 MMHG | DIASTOLIC BLOOD PRESSURE: 70 MMHG | OXYGEN SATURATION: 100 %

## 2022-11-22 DIAGNOSIS — Z00.00 ANNUAL PHYSICAL EXAM: ICD-10-CM

## 2022-11-22 DIAGNOSIS — E11.65 TYPE 2 DIABETES MELLITUS WITH HYPERGLYCEMIA, WITHOUT LONG-TERM CURRENT USE OF INSULIN: ICD-10-CM

## 2022-11-22 DIAGNOSIS — I10 ESSENTIAL HYPERTENSION: ICD-10-CM

## 2022-11-22 DIAGNOSIS — Z00.00 ANNUAL PHYSICAL EXAM: Primary | ICD-10-CM

## 2022-11-22 DIAGNOSIS — E78.2 MIXED HYPERLIPIDEMIA: ICD-10-CM

## 2022-11-22 DIAGNOSIS — Z23 NEED FOR TDAP VACCINATION: ICD-10-CM

## 2022-11-22 DIAGNOSIS — R05.1 ACUTE COUGH: ICD-10-CM

## 2022-11-22 LAB
ALBUMIN SERPL-MCNC: 4.3 G/DL (ref 3.5–5.2)
ALBUMIN UR-MCNC: <1.2 MG/DL
ALBUMIN/GLOB SERPL: 2 G/DL
ALP SERPL-CCNC: 97 U/L (ref 39–117)
ALT SERPL W P-5'-P-CCNC: 48 U/L (ref 1–33)
ANION GAP SERPL CALCULATED.3IONS-SCNC: 12.4 MMOL/L (ref 5–15)
AST SERPL-CCNC: 26 U/L (ref 1–32)
BASOPHILS # BLD AUTO: 0.04 10*3/MM3 (ref 0–0.2)
BASOPHILS NFR BLD AUTO: 0.6 % (ref 0–1.5)
BILIRUB SERPL-MCNC: 0.3 MG/DL (ref 0–1.2)
BUN SERPL-MCNC: 12 MG/DL (ref 6–20)
BUN/CREAT SERPL: 13.3 (ref 7–25)
CALCIUM SPEC-SCNC: 9.6 MG/DL (ref 8.6–10.5)
CHLORIDE SERPL-SCNC: 107 MMOL/L (ref 98–107)
CHOLEST SERPL-MCNC: 153 MG/DL (ref 0–200)
CO2 SERPL-SCNC: 23.6 MMOL/L (ref 22–29)
CREAT SERPL-MCNC: 0.9 MG/DL (ref 0.57–1)
DEPRECATED RDW RBC AUTO: 37.8 FL (ref 37–54)
EGFRCR SERPLBLD CKD-EPI 2021: 74.7 ML/MIN/1.73
EOSINOPHIL # BLD AUTO: 0.09 10*3/MM3 (ref 0–0.4)
EOSINOPHIL NFR BLD AUTO: 1.4 % (ref 0.3–6.2)
ERYTHROCYTE [DISTWIDTH] IN BLOOD BY AUTOMATED COUNT: 11.6 % (ref 12.3–15.4)
GLOBULIN UR ELPH-MCNC: 2.1 GM/DL
GLUCOSE SERPL-MCNC: 110 MG/DL (ref 65–99)
HBA1C MFR BLD: 6.6 % (ref 4.8–5.6)
HCT VFR BLD AUTO: 39.9 % (ref 34–46.6)
HDLC SERPL-MCNC: 41 MG/DL (ref 40–60)
HGB BLD-MCNC: 13.4 G/DL (ref 12–15.9)
IMM GRANULOCYTES # BLD AUTO: 0.03 10*3/MM3 (ref 0–0.05)
IMM GRANULOCYTES NFR BLD AUTO: 0.5 % (ref 0–0.5)
LDLC SERPL CALC-MCNC: 84 MG/DL (ref 0–100)
LDLC/HDLC SERPL: 1.93 {RATIO}
LYMPHOCYTES # BLD AUTO: 1.87 10*3/MM3 (ref 0.7–3.1)
LYMPHOCYTES NFR BLD AUTO: 28.3 % (ref 19.6–45.3)
MCH RBC QN AUTO: 29.8 PG (ref 26.6–33)
MCHC RBC AUTO-ENTMCNC: 33.6 G/DL (ref 31.5–35.7)
MCV RBC AUTO: 88.7 FL (ref 79–97)
MONOCYTES # BLD AUTO: 0.54 10*3/MM3 (ref 0.1–0.9)
MONOCYTES NFR BLD AUTO: 8.2 % (ref 5–12)
NEUTROPHILS NFR BLD AUTO: 4.03 10*3/MM3 (ref 1.7–7)
NEUTROPHILS NFR BLD AUTO: 61 % (ref 42.7–76)
NRBC BLD AUTO-RTO: 0 /100 WBC (ref 0–0.2)
PLATELET # BLD AUTO: 257 10*3/MM3 (ref 140–450)
PMV BLD AUTO: 9.4 FL (ref 6–12)
POTASSIUM SERPL-SCNC: 4.5 MMOL/L (ref 3.5–5.2)
PROT SERPL-MCNC: 6.4 G/DL (ref 6–8.5)
RBC # BLD AUTO: 4.5 10*6/MM3 (ref 3.77–5.28)
SODIUM SERPL-SCNC: 143 MMOL/L (ref 136–145)
T4 FREE SERPL-MCNC: 1.09 NG/DL (ref 0.93–1.7)
TRIGL SERPL-MCNC: 164 MG/DL (ref 0–150)
TSH SERPL DL<=0.05 MIU/L-ACNC: 1.57 UIU/ML (ref 0.27–4.2)
VLDLC SERPL-MCNC: 28 MG/DL (ref 5–40)
WBC NRBC COR # BLD: 6.6 10*3/MM3 (ref 3.4–10.8)

## 2022-11-22 PROCEDURE — 90715 TDAP VACCINE 7 YRS/> IM: CPT | Performed by: NURSE PRACTITIONER

## 2022-11-22 PROCEDURE — 83036 HEMOGLOBIN GLYCOSYLATED A1C: CPT

## 2022-11-22 PROCEDURE — 36415 COLL VENOUS BLD VENIPUNCTURE: CPT

## 2022-11-22 PROCEDURE — 82043 UR ALBUMIN QUANTITATIVE: CPT

## 2022-11-22 PROCEDURE — 80061 LIPID PANEL: CPT

## 2022-11-22 PROCEDURE — 90471 IMMUNIZATION ADMIN: CPT | Performed by: NURSE PRACTITIONER

## 2022-11-22 PROCEDURE — 84439 ASSAY OF FREE THYROXINE: CPT

## 2022-11-22 PROCEDURE — 99396 PREV VISIT EST AGE 40-64: CPT | Performed by: NURSE PRACTITIONER

## 2022-11-22 PROCEDURE — 80050 GENERAL HEALTH PANEL: CPT

## 2022-11-22 RX ORDER — ROSUVASTATIN CALCIUM 10 MG/1
10 TABLET, COATED ORAL
Qty: 90 TABLET | Refills: 1 | Status: SHIPPED | OUTPATIENT
Start: 2022-11-22

## 2022-11-22 RX ORDER — BENZONATATE 100 MG/1
100 CAPSULE ORAL 3 TIMES DAILY PRN
Qty: 24 CAPSULE | Refills: 0 | Status: SHIPPED | OUTPATIENT
Start: 2022-11-22

## 2022-11-22 NOTE — PROGRESS NOTES
Chief Complaint  Hypertension, Hyperlipidemia, Diabetes, Cough, and Annual Exam    Subjective            Amanda Cason presents to [facility]  History of Present Illness  Pt is an annual f/u for HTN, HLD, and DM. Pt has a lingering cough. Pt was recently treated for covid. Pt was prescribed tessalon perles and states they helped but she is out.  Pt is followed by Angelica Carrillo for DM management.    Pt is due labs.     Mammo:  8/25/2022  PAP: 2017, pt is due will schedule with our office  Colonoscopy:  11/04/2021    Pt is due an eye exam:   Pt will self schedule    All other vaccines were declined and pt understands risks of not having.  Pt will get Tdap today.        Past Medical History:   Diagnosis Date   • Acid reflux    • Arthritis     NECK, HANDS   • Deafness in left ear    • Diabetes mellitus type 2, noninsulin dependent (HCC)    • Diabetic eye exam (HCC) 06/23/2015    Last completed-Saulo Siu   • Dry eye syndrome    • Encounter for diabetic foot exam (HCC) 11/20/2019    last completed- Done in office durning visit   • GERD (gastroesophageal reflux disease) 02/12/2018   • Glaucoma    • Hyperlipidemia    • Pap smear for cervical cancer screening 08/30/2017   • Subcutaneous mass of left thumb    • Vaginal Pap smear 08/20/2017    For cancer screening   • Visit for screening mammogram 11/20/2019    Declined       No Known Allergies     Past Surgical History:   Procedure Laterality Date   • APPENDECTOMY      Childhood   • COLONOSCOPY N/A 11/04/2021    Procedure: COLONOSCOPY with polypectomy.;  Surgeon: Silvano Garcia MD;  Location: [facility] ENDOSCOPY;  Service: General;  Laterality: N/A;  Transvers polyp   • ENDOMETRIAL ABLATION     • INCISION AND DRAINAGE OF WOUND Left 7/15/2022    Procedure: LEFT THUMB VOLAR MASS EXCISION;  Surgeon: Sergio Calvin MD;  [place]  [facility]  Service: Orthopedics;  Laterality: Left;   • OTHER SURGICAL HISTORY Left     Ear  "Surgery  - x3 hole in ear   • ROTATOR CUFF REPAIR Left    • SHOULDER LIGAMENT REPAIR Right    • TUBAL ABDOMINAL LIGATION  1986        Social History     Tobacco Use   • Smoking status: Never   • Smokeless tobacco: Never   Substance Use Topics   • Alcohol use: Never       Family History   Problem Relation Age of Onset   • Diabetes Mother         mellitus   • Breast cancer Mother    • Diabetes type II Mother         Mellitus   • Heart disease Father    • Diabetes Sister         Mellitus   • Diabetes Brother         Mellitus   • Malig Hyperthermia Neg Hx         Current Outpatient Medications on File Prior to Visit   Medication Sig   • ascorbic acid (VITAMIN C) 1000 MG tablet Take 1,000 mg by mouth Daily.   • dapagliflozin-metformin HCl ER (Xigduo XR)  MG tablet Take 1 tablet by mouth Daily.   • glipizide (GLUCOTROL XL) 5 MG ER tablet Take 1 tablet by mouth Daily.   • lisinopril (PRINIVIL,ZESTRIL) 2.5 MG tablet Take 1 tablet by mouth Daily.   • Multiple Vitamins-Minerals (ICAPS PO) Take 1 tablet by mouth 2 (Two) Times a Day. PT TO HOLD FOR SURGERY   • Omega-3 Krill Oil 500 MG capsule Take 500 mg by mouth Daily. PT TO HOLD FOR SURGERY   • [DISCONTINUED] rosuvastatin (CRESTOR) 10 MG tablet TAKE 1 TABLET BY MOUTH EVERYDAY AT BEDTIME (Patient taking differently: Take 10 mg by mouth Every Night.)   • [DISCONTINUED] benzonatate (Tessalon Perles) 100 MG capsule Take 1 capsule by mouth 3 (Three) Times a Day As Needed for Cough.   • [DISCONTINUED] Melatonin 10 MG tablet Take 10 mg by mouth Every Night.     No current facility-administered medications on file prior to visit.       Health Maintenance Due   Topic Date Due   • ANNUAL PHYSICAL  Never done   • DIABETIC FOOT EXAM  06/15/2021   • URINE MICROALBUMIN  11/22/2022       Objective     /70   Pulse 64   Ht 162.6 cm (64\")   Wt 70.3 kg (155 lb)   SpO2 100%   BMI 26.61 kg/m²       Physical Exam  Constitutional:       General: She is not in acute distress.     " Appearance: Normal appearance. She is not ill-appearing.   HENT:      Head: Normocephalic and atraumatic.      Right Ear: Tympanic membrane, ear canal and external ear normal.      Left Ear: Tympanic membrane, ear canal and external ear normal.      Nose: Nose normal.   Cardiovascular:      Rate and Rhythm: Normal rate and regular rhythm.      Pulses:           Dorsalis pedis pulses are 2+ on the right side and 2+ on the left side.      Heart sounds: Normal heart sounds. No murmur heard.  Pulmonary:      Effort: Pulmonary effort is normal. No respiratory distress.      Breath sounds: Normal breath sounds.   Chest:      Chest wall: No tenderness.   Abdominal:      General: Abdomen is flat. Bowel sounds are normal. There is no distension.      Palpations: Abdomen is soft. There is no mass.      Tenderness: There is no abdominal tenderness. There is no guarding.   Musculoskeletal:         General: No swelling or tenderness. Normal range of motion.      Cervical back: Normal range of motion and neck supple.   Feet:      Right foot:      Protective Sensation: 3 sites tested. 3 sites sensed.      Skin integrity: Skin integrity normal. No ulcer or blister.      Toenail Condition: Right toenails are normal.      Left foot:      Protective Sensation: 3 sites tested. 3 sites sensed.      Skin integrity: Skin integrity normal. No ulcer or blister.      Toenail Condition: Left toenails are normal.      Comments:      Skin:     General: Skin is warm and dry.      Findings: No rash.   Neurological:      General: No focal deficit present.      Mental Status: She is alert and oriented to person, place, and time. Mental status is at baseline.      Gait: Gait normal.   Psychiatric:         Mood and Affect: Mood normal.         Behavior: Behavior normal.         Thought Content: Thought content normal.         Judgment: Judgment normal.     Monofilament Test Performed      Result Review :                           Assessment and Plan         Diagnoses and all orders for this visit:    1. Annual physical exam (Primary)  -     CBC w AUTO Differential; Future  -     Comprehensive metabolic panel; Future  -     Lipid panel; Future  -     TSH; Future  -     T4, free; Future  -     Hemoglobin A1c; Future  -     MicroAlbumin, Urine, Random - Urine, Clean Catch; Future  -     Tdap Vaccine Greater Than or Equal To 6yo IM    2. Essential hypertension  Comments:  stable on lisinopril 2.5mg, continue  Orders:  -     CBC w AUTO Differential; Future  -     Comprehensive metabolic panel; Future  -     Lipid panel; Future    3. Mixed hyperlipidemia  Comments:  stable on crestor 10mg, continue  Orders:  -     Comprehensive metabolic panel; Future  -     Lipid panel; Future  -     rosuvastatin (CRESTOR) 10 MG tablet; Take 1 tablet by mouth every night at bedtime.  Dispense: 90 tablet; Refill: 1    4. Type 2 diabetes mellitus with hyperglycemia, without long-term current use of insulin (HCC)  Comments:  stable on xigduo 10/1000mg, glipzide 5mg, continue, continue f/u with Angelica Carrillo for Mgmt  Orders:  -     CBC w AUTO Differential; Future  -     Comprehensive metabolic panel; Future  -     Lipid panel; Future  -     TSH; Future  -     T4, free; Future  -     Hemoglobin A1c; Future  -     MicroAlbumin, Urine, Random - Urine, Clean Catch; Future    5. Need for Tdap vaccination  -     Tdap Vaccine Greater Than or Equal To 6yo IM    6. Acute cough  -     benzonatate (Tessalon Perles) 100 MG capsule; Take 1 capsule by mouth 3 (Three) Times a Day As Needed for Cough.  Dispense: 24 capsule; Refill: 0      Preventative Counseling:  Healthy diet  Daily exercise  Get adequate sleep        Follow Up     Return in about 6 months (around 5/22/2023).    Patient was given instructions and counseling regarding her condition or for health maintenance advice. Please see specific information pulled into the AVS if appropriate.     Amanda Cason  reports that she has never smoked.  She has never used smokeless tobacco..

## 2022-11-28 ENCOUNTER — APPOINTMENT (OUTPATIENT)
Dept: DIABETES SERVICES | Facility: HOSPITAL | Age: 57
End: 2022-11-28

## 2022-12-28 ENCOUNTER — OFFICE VISIT (OUTPATIENT)
Dept: DIABETES SERVICES | Facility: HOSPITAL | Age: 57
End: 2022-12-28

## 2022-12-28 VITALS
SYSTOLIC BLOOD PRESSURE: 116 MMHG | WEIGHT: 153 LBS | HEART RATE: 64 BPM | OXYGEN SATURATION: 100 % | TEMPERATURE: 97.4 F | DIASTOLIC BLOOD PRESSURE: 65 MMHG | BODY MASS INDEX: 26.12 KG/M2 | HEIGHT: 64 IN

## 2022-12-28 DIAGNOSIS — E66.3 OVERWEIGHT (BMI 25.0-29.9): ICD-10-CM

## 2022-12-28 DIAGNOSIS — E11.22 CONTROLLED TYPE 2 DIABETES MELLITUS WITH STAGE 2 CHRONIC KIDNEY DISEASE, WITHOUT LONG-TERM CURRENT USE OF INSULIN: Primary | ICD-10-CM

## 2022-12-28 DIAGNOSIS — N18.2 CONTROLLED TYPE 2 DIABETES MELLITUS WITH STAGE 2 CHRONIC KIDNEY DISEASE, WITHOUT LONG-TERM CURRENT USE OF INSULIN: Primary | ICD-10-CM

## 2022-12-28 PROCEDURE — 99213 OFFICE O/P EST LOW 20 MIN: CPT | Performed by: NURSE PRACTITIONER

## 2022-12-28 PROCEDURE — G0463 HOSPITAL OUTPT CLINIC VISIT: HCPCS | Performed by: NURSE PRACTITIONER

## 2022-12-28 RX ORDER — GLIPIZIDE 5 MG/1
5 TABLET, FILM COATED, EXTENDED RELEASE ORAL DAILY
Qty: 90 TABLET | Refills: 1 | Status: SHIPPED | OUTPATIENT
Start: 2022-12-28

## 2022-12-28 RX ORDER — DAPAGLIFLOZIN AND METFORMIN HYDROCHLORIDE 10; 1000 MG/1; MG/1
1 TABLET, FILM COATED, EXTENDED RELEASE ORAL DAILY
Qty: 90 TABLET | Refills: 1 | Status: SHIPPED | OUTPATIENT
Start: 2022-12-28 | End: 2023-02-08 | Stop reason: SDUPTHER

## 2022-12-28 NOTE — PROGRESS NOTES
Chief Complaint  Diabetes (Follow up, med mgt )    Referred By: DULCE Cabrales presents to [facility] for diabetes medication management    History of Present Illness    Visit type:  follow-up  Diabetes type:  Type 2  Current diabetes status/concerns/issues:  She has no concerns regarding her diabetes today  Other health concerns: no new health issues; she states she is going to [place] for several months to assist her daughter.  Diabetes symptoms:    Polyuria: No   Polydipsia: No   Polyphagia: No   Blurred vision: No   Excessive fatigue: No   Diabetes complications:  Neuro: None  Renal: Stage II renal disease  Eyes: None  Amputation/Wounds: None  GI: None  Cardiovascular: HTN and hyperlipidemia  ED: N/A  Other: None  Hypoglycemia:  Level 1 hypoglycemia (54 mg/dL - 70 mg/dL); Frequency - only one episode in the 60s which she said was due to increased activity  Hypoglycemia Symptoms:  mood/behavior change  Current diabetes treatment:  Xigduo XR  once a day and glipizide XL 5 mg once a day   Blood glucose device:  Meter  Blood glucose monitoring frequency:  1  Blood glucose range/average:  She is staying below 100 fasting on most occasions.  Diet:  Limits high carb/sweet foods, Avoids sugary drinks  Activity/Exercise:  None    Past Medical History:   Diagnosis Date   • Acid reflux    • Arthritis     NECK, HANDS   • Deafness in left ear    • Diabetes mellitus type 2, noninsulin dependent (HCC)    • Diabetic eye exam (HCC) 06/23/2015    Last completed-[facility]   • Dry eye syndrome    • Encounter for diabetic foot exam (HCC) 11/20/2019    last completed- Done in office durning visit   • GERD (gastroesophageal reflux disease) 02/12/2018   • Glaucoma    • Hyperlipidemia    • Pap smear for cervical cancer screening 08/30/2017   • Subcutaneous mass of left thumb    • Vaginal Pap smear 08/20/2017    For cancer screening   • Visit for  screening mammogram 11/20/2019    Declined     Past Surgical History:   Procedure Laterality Date   • APPENDECTOMY      Childhood   • COLONOSCOPY N/A 11/04/2021    Procedure: COLONOSCOPY with polypectomy.;  Surgeon: Silvano Garcia MD;  Location: [facility] ENDOSCOPY;  Service: General;  Laterality: N/A;  Transvers polyp   • ENDOMETRIAL ABLATION     • INCISION AND DRAINAGE OF WOUND Left 7/15/2022    Procedure: LEFT THUMB VOLAR MASS EXCISION;  Surgeon: Sergio Calvin MD;  [facility]  [facility];  Service: Orthopedics;  Laterality: Left;   • OTHER SURGICAL HISTORY Left     Ear Surgery  - x3 hole in ear   • ROTATOR CUFF REPAIR Left    • SHOULDER LIGAMENT REPAIR Right    • TUBAL ABDOMINAL LIGATION  1986     Family History   Problem Relation Age of Onset   • Diabetes Mother         mellitus   • Breast cancer Mother    • Diabetes type II Mother         Mellitus   • Heart disease Father    • Diabetes Sister         Mellitus   • Diabetes Brother         Mellitus   • Malig Hyperthermia Neg Hx      Social History     Socioeconomic History   • Marital status:    • Number of children: 2   Tobacco Use   • Smoking status: Never   • Smokeless tobacco: Never   Vaping Use   • Vaping Use: Never used   Substance and Sexual Activity   • Alcohol use: Never   • Drug use: Never   • Sexual activity: Defer     No Known Allergies    Current Outpatient Medications:   •  ascorbic acid (VITAMIN C) 1000 MG tablet, Take 1,000 mg by mouth Daily., Disp: , Rfl:   •  benzonatate (Tessalon Perles) 100 MG capsule, Take 1 capsule by mouth 3 (Three) Times a Day As Needed for Cough., Disp: 24 capsule, Rfl: 0  •  dapagliflozin-metformin HCl ER (Xigduo XR)  MG tablet, Take 1 tablet by mouth Daily., Disp: 90 tablet, Rfl: 1  •  glipizide (GLUCOTROL XL) 5 MG ER tablet, Take 1 tablet by mouth Daily., Disp: 90 tablet, Rfl: 1  •  lisinopril (PRINIVIL,ZESTRIL) 2.5 MG tablet, Take 1 tablet by mouth Daily., Disp: 90 tablet, Rfl: 1  •  Multiple  "Vitamins-Minerals (ICAPS PO), Take 1 tablet by mouth 2 (Two) Times a Day. PT TO HOLD FOR SURGERY, Disp: , Rfl:   •  Omega-3 Krill Oil 500 MG capsule, Take 500 mg by mouth Daily. PT TO HOLD FOR SURGERY, Disp: , Rfl:   •  rosuvastatin (CRESTOR) 10 MG tablet, Take 1 tablet by mouth every night at bedtime., Disp: 90 tablet, Rfl: 1    Review of Systems   Constitutional: Positive for unexpected weight loss (4 pounds). Negative for activity change, appetite change, fatigue and unexpected weight gain.   Eyes: Negative for blurred vision and visual disturbance.   Gastrointestinal: Positive for GERD. Negative for abdominal pain, constipation, diarrhea, nausea, vomiting and indigestion.   Endocrine: Negative for polydipsia, polyphagia and polyuria.   Neurological: Positive for numbness (Left hand).        Objective     Vitals:    12/28/22 0818   BP: 116/65   BP Location: Left arm   Patient Position: Sitting   Cuff Size: Adult   Pulse: 64   Temp: 97.4 °F (36.3 °C)   SpO2: 100%   Weight: 69.4 kg (153 lb)   Height: 162.6 cm (64\")     Body mass index is 26.26 kg/m².    Physical Exam  Constitutional:       Appearance: Normal appearance.      Comments: Overweight with BMI of 26.26   HENT:      Head: Normocephalic and atraumatic.      Right Ear: External ear normal.      Left Ear: External ear normal.      Nose: Nose normal.   Eyes:      Extraocular Movements: Extraocular movements intact.      Conjunctiva/sclera: Conjunctivae normal.   Pulmonary:      Effort: Pulmonary effort is normal.   Musculoskeletal:         General: Normal range of motion.      Cervical back: Normal range of motion.   Skin:     General: Skin is warm and dry.   Neurological:      General: No focal deficit present.      Mental Status: She is alert and oriented to person, place, and time. Mental status is at baseline.   Psychiatric:         Mood and Affect: Mood normal.         Behavior: Behavior normal.         Thought Content: Thought content normal.         " Judgment: Judgment normal.         Result Review :   The following data was reviewed by: DULCE Serrano on 12/28/2022:    Most Recent A1C    HGBA1C Most Recent 11/22/22   Hemoglobin A1C 6.60 (A)   (A) Abnormal value              A1C Last 3 Results    HGBA1C Last 3 Results 3/2/22 5/23/22 11/22/22   Hemoglobin A1C 6.6 6.70 (A) 6.60 (A)   (A) Abnormal value            Her most recent A1c was collected on 11/22/2022 was 6.6% indicating controlled type 2 diabetes.  This was down slightly from the prior result of 6.7 collected in May of this year    Creatinine   Date Value Ref Range Status   11/22/2022 0.90 0.57 - 1.00 mg/dL Final   07/13/2022 0.88 0.57 - 1.00 mg/dL Final     eGFR   Date Value Ref Range Status   11/22/2022 74.7 >60.0 mL/min/1.73 Final     Comment:     National Kidney Foundation and American Society of Nephrology (ASN) Task Force recommended calculation based on the Chronic Kidney Disease Epidemiology Collaboration (CKD-EPI) equation refit without adjustment for race.   07/13/2022 77.2 >60.0 mL/min/1.73 Final     Comment:     National Kidney Foundation and American Society of Nephrology (ASN) Task Force recommended calculation based on the Chronic Kidney Disease Epidemiology Collaboration (CKD-EPI) equation refit without adjustment for race.     Labs collected on 11/22/2022 show stage II renal disease    Microalbumin, Urine   Date Value Ref Range Status   11/22/2022 <1.2 mg/dL Final   11/22/2021 <1.2 mg/dL Final     Urine microalbumin collected on 11/22/2022 is within normal limits    Total Cholesterol   Date Value Ref Range Status   11/22/2022 153 0 - 200 mg/dL Final   05/23/2022 176 0 - 200 mg/dL Final     Triglycerides   Date Value Ref Range Status   11/22/2022 164 (H) 0 - 150 mg/dL Final   05/23/2022 140 0 - 150 mg/dL Final     HDL Cholesterol   Date Value Ref Range Status   11/22/2022 41 40 - 60 mg/dL Final   05/23/2022 48 40 - 60 mg/dL Final     LDL Cholesterol    Date Value Ref Range  Status   11/22/2022 84 0 - 100 mg/dL Final   05/23/2022 103 (H) 0 - 100 mg/dL Final     Lipid panel collected 11/22/2022 shows hypertriglyceridemia            Assessment: Patient's A1c remains well controlled without frequent hypoglycemia.  She is tolerating her medications without difficulty      Diagnoses and all orders for this visit:    1. Controlled type 2 diabetes mellitus with stage 2 chronic kidney disease, without long-term current use of insulin (HCC) (Primary)  -     dapagliflozin-metformin HCl ER (Xigduo XR)  MG tablet; Take 1 tablet by mouth Daily.  Dispense: 90 tablet; Refill: 1  -     glipizide (GLUCOTROL XL) 5 MG ER tablet; Take 1 tablet by mouth Daily.  Dispense: 90 tablet; Refill: 1    2. Overweight (BMI 25.0-29.9)        Plan: No changes will be made to her treatment plan today.  She will be scheduled for follow-up appointment in 6 months.    The patient will monitor her blood glucose levels 1-2 times each day.  If she develops problematic hyperglycemia or hypoglycemia or adverse drug reactions, she will contact the office for further instructions.        Follow Up     Return in about 6 months (around 6/28/2023) for Video visit.    Patient was given instructions and counseling regarding her condition or for health maintenance advice. Please see specific information pulled into the AVS if appropriate.     Angelica Carrillo, DULCE  12/28/2022      Dictated Utilizing Dragon Dictation.  Please note that portions of this note were completed with a voice recognition program.  Part of this note may be an electronic transcription/translation of spoken language to printed text using the Dragon Dictation System.

## 2023-01-17 DIAGNOSIS — E11.22 CONTROLLED TYPE 2 DIABETES MELLITUS WITH STAGE 2 CHRONIC KIDNEY DISEASE, WITHOUT LONG-TERM CURRENT USE OF INSULIN: ICD-10-CM

## 2023-01-17 DIAGNOSIS — N18.2 CONTROLLED TYPE 2 DIABETES MELLITUS WITH STAGE 2 CHRONIC KIDNEY DISEASE, WITHOUT LONG-TERM CURRENT USE OF INSULIN: ICD-10-CM

## 2023-01-17 RX ORDER — DAPAGLIFLOZIN AND METFORMIN HYDROCHLORIDE 10; 1000 MG/1; MG/1
1 TABLET, FILM COATED, EXTENDED RELEASE ORAL DAILY
Qty: 90 TABLET | Refills: 1 | OUTPATIENT
Start: 2023-01-17

## 2023-01-17 NOTE — TELEPHONE ENCOUNTER
Caller: Amanda Cason    Relationship: Self    Best call back number:[phone]    Requested Prescriptions:   Requested Prescriptions     Pending Prescriptions Disp Refills   • dapagliflozin-metformin HCl ER (Xigduo XR)  MG tablet 90 tablet 1     Sig: Take 1 tablet by mouth Daily.        Pharmacy where request should be sent: [facility]/PHARMACY #[id number] - [place], [place] - [address] AT [facility] [phone] [place] [phone] FX     Does the patient have less than a 3 day supply:  [x] Yes  [] No    Would you like a call back once the refill request has been completed: [x] Yes [] No    If the office needs to give you a call back, can they leave a voicemail: [x] Yes [] No    Catalina Price Rep   01/17/23 09:17 EST

## 2023-01-31 RX ORDER — LISINOPRIL 2.5 MG/1
TABLET ORAL
Qty: 90 TABLET | Refills: 1 | OUTPATIENT
Start: 2023-01-31

## 2023-02-08 ENCOUNTER — PATIENT MESSAGE (OUTPATIENT)
Dept: DIABETES SERVICES | Facility: HOSPITAL | Age: 58
End: 2023-02-08
Payer: COMMERCIAL

## 2023-02-08 ENCOUNTER — TELEPHONE (OUTPATIENT)
Dept: DIABETES SERVICES | Facility: HOSPITAL | Age: 58
End: 2023-02-08
Payer: COMMERCIAL

## 2023-02-08 DIAGNOSIS — E11.22 CONTROLLED TYPE 2 DIABETES MELLITUS WITH STAGE 2 CHRONIC KIDNEY DISEASE, WITHOUT LONG-TERM CURRENT USE OF INSULIN: ICD-10-CM

## 2023-02-08 DIAGNOSIS — N18.2 CONTROLLED TYPE 2 DIABETES MELLITUS WITH STAGE 2 CHRONIC KIDNEY DISEASE, WITHOUT LONG-TERM CURRENT USE OF INSULIN: ICD-10-CM

## 2023-02-08 RX ORDER — METFORMIN HYDROCHLORIDE 500 MG/1
1000 TABLET, EXTENDED RELEASE ORAL
Qty: 60 TABLET | Refills: 2 | Status: SHIPPED | OUTPATIENT
Start: 2023-02-08 | End: 2023-02-09 | Stop reason: SDUPTHER

## 2023-02-08 RX ORDER — DAPAGLIFLOZIN AND METFORMIN HYDROCHLORIDE 10; 1000 MG/1; MG/1
1 TABLET, FILM COATED, EXTENDED RELEASE ORAL DAILY
Qty: 90 TABLET | Refills: 1 | Status: SHIPPED | OUTPATIENT
Start: 2023-02-08 | End: 2023-02-08

## 2023-02-08 RX ORDER — DAPAGLIFLOZIN 10 MG/1
10 TABLET, FILM COATED ORAL DAILY
Qty: 30 TABLET | Refills: 2 | Status: SHIPPED | OUTPATIENT
Start: 2023-02-08 | End: 2023-03-10

## 2023-02-08 NOTE — TELEPHONE ENCOUNTER
Pt called and stated she is having trouble getting her Xigduo and is asking for an alternative, Please advise

## 2023-02-09 ENCOUNTER — PATIENT MESSAGE (OUTPATIENT)
Dept: DIABETES SERVICES | Facility: HOSPITAL | Age: 58
End: 2023-02-09
Payer: COMMERCIAL

## 2023-02-09 DIAGNOSIS — E11.22 CONTROLLED TYPE 2 DIABETES MELLITUS WITH STAGE 2 CHRONIC KIDNEY DISEASE, WITHOUT LONG-TERM CURRENT USE OF INSULIN: Primary | ICD-10-CM

## 2023-02-09 DIAGNOSIS — N18.2 CONTROLLED TYPE 2 DIABETES MELLITUS WITH STAGE 2 CHRONIC KIDNEY DISEASE, WITHOUT LONG-TERM CURRENT USE OF INSULIN: Primary | ICD-10-CM

## 2023-02-09 RX ORDER — METFORMIN HYDROCHLORIDE 500 MG/1
1000 TABLET, EXTENDED RELEASE ORAL
Qty: 60 TABLET | Refills: 2 | Status: SHIPPED | OUTPATIENT
Start: 2023-02-09

## 2023-04-06 RX ORDER — LISINOPRIL 2.5 MG/1
TABLET ORAL
Qty: 90 TABLET | Refills: 1 | Status: SHIPPED | OUTPATIENT
Start: 2023-04-06

## 2023-05-18 PROBLEM — R22.2 NODULE OF SKIN OF CHEST: Status: ACTIVE | Noted: 2022-07-14

## 2023-05-18 PROBLEM — G90.512 COMPLEX REGIONAL PAIN SYNDROME TYPE 1 OF LEFT UPPER EXTREMITY: Status: ACTIVE | Noted: 2022-07-05

## 2023-05-18 PROBLEM — M50.30 DDD (DEGENERATIVE DISC DISEASE), CERVICAL: Status: ACTIVE | Noted: 2022-10-12

## 2023-05-18 PROBLEM — M48.02 CERVICAL STENOSIS OF SPINAL CANAL: Status: ACTIVE | Noted: 2022-07-05

## 2023-06-15 DIAGNOSIS — E78.2 MIXED HYPERLIPIDEMIA: ICD-10-CM

## 2023-06-15 RX ORDER — ROSUVASTATIN CALCIUM 10 MG/1
TABLET, COATED ORAL
Qty: 90 TABLET | Refills: 1 | Status: SHIPPED | OUTPATIENT
Start: 2023-06-15

## 2023-09-25 DIAGNOSIS — E11.22 CONTROLLED TYPE 2 DIABETES MELLITUS WITH STAGE 2 CHRONIC KIDNEY DISEASE, WITHOUT LONG-TERM CURRENT USE OF INSULIN: ICD-10-CM

## 2023-09-25 DIAGNOSIS — N18.2 CONTROLLED TYPE 2 DIABETES MELLITUS WITH STAGE 2 CHRONIC KIDNEY DISEASE, WITHOUT LONG-TERM CURRENT USE OF INSULIN: ICD-10-CM

## 2023-09-25 RX ORDER — GLIPIZIDE 5 MG/1
TABLET, FILM COATED, EXTENDED RELEASE ORAL
Qty: 90 TABLET | Refills: 1 | OUTPATIENT
Start: 2023-09-25

## 2023-09-25 RX ORDER — GLIPIZIDE 5 MG/1
5 TABLET, FILM COATED, EXTENDED RELEASE ORAL DAILY
Qty: 90 TABLET | Refills: 1 | Status: SHIPPED | OUTPATIENT
Start: 2023-09-25

## 2023-09-25 NOTE — TELEPHONE ENCOUNTER
Pt called and left a message asking for a refill on her glipizide 5 mg once daily, refills sent to pharmacy

## 2023-10-05 RX ORDER — LISINOPRIL 2.5 MG/1
TABLET ORAL
Qty: 30 TABLET | Refills: 0 | Status: SHIPPED | OUTPATIENT
Start: 2023-10-05

## 2023-11-06 RX ORDER — LISINOPRIL 2.5 MG/1
TABLET ORAL
Qty: 30 TABLET | Refills: 0 | Status: SHIPPED | OUTPATIENT
Start: 2023-11-06

## 2023-11-09 DIAGNOSIS — E78.2 MIXED HYPERLIPIDEMIA: ICD-10-CM

## 2023-11-09 DIAGNOSIS — E11.22 TYPE 2 DIABETES MELLITUS WITH DIABETIC CHRONIC KIDNEY DISEASE: ICD-10-CM

## 2023-11-09 RX ORDER — ROSUVASTATIN CALCIUM 10 MG/1
TABLET, COATED ORAL
Qty: 90 TABLET | Refills: 1 | OUTPATIENT
Start: 2023-11-09

## 2023-11-09 RX ORDER — LISINOPRIL 2.5 MG/1
TABLET ORAL
Qty: 30 TABLET | Refills: 0 | OUTPATIENT
Start: 2023-11-09

## 2023-11-09 RX ORDER — DAPAGLIFLOZIN AND METFORMIN HYDROCHLORIDE 10; 1000 MG/1; MG/1
1 TABLET, FILM COATED, EXTENDED RELEASE ORAL DAILY
Qty: 90 TABLET | Refills: 1 | Status: SHIPPED | OUTPATIENT
Start: 2023-11-09

## 2023-11-20 ENCOUNTER — LAB (OUTPATIENT)
Dept: LAB | Facility: HOSPITAL | Age: 58
End: 2023-11-20
Payer: COMMERCIAL

## 2023-11-20 ENCOUNTER — OFFICE VISIT (OUTPATIENT)
Dept: FAMILY MEDICINE CLINIC | Facility: CLINIC | Age: 58
End: 2023-11-20
Payer: COMMERCIAL

## 2023-11-20 VITALS
WEIGHT: 144 LBS | HEIGHT: 64 IN | DIASTOLIC BLOOD PRESSURE: 64 MMHG | SYSTOLIC BLOOD PRESSURE: 110 MMHG | OXYGEN SATURATION: 99 % | BODY MASS INDEX: 24.59 KG/M2 | HEART RATE: 73 BPM

## 2023-11-20 DIAGNOSIS — I10 PRIMARY HYPERTENSION: Primary | ICD-10-CM

## 2023-11-20 DIAGNOSIS — E11.9 TYPE 2 DIABETES MELLITUS WITHOUT COMPLICATION, WITHOUT LONG-TERM CURRENT USE OF INSULIN: ICD-10-CM

## 2023-11-20 DIAGNOSIS — I10 PRIMARY HYPERTENSION: ICD-10-CM

## 2023-11-20 DIAGNOSIS — Z13.29 SCREENING FOR THYROID DISORDER: ICD-10-CM

## 2023-11-20 DIAGNOSIS — Z12.31 ENCOUNTER FOR SCREENING MAMMOGRAM FOR MALIGNANT NEOPLASM OF BREAST: ICD-10-CM

## 2023-11-20 DIAGNOSIS — E78.5 HYPERLIPIDEMIA, UNSPECIFIED HYPERLIPIDEMIA TYPE: ICD-10-CM

## 2023-11-20 DIAGNOSIS — J01.01 ACUTE RECURRENT MAXILLARY SINUSITIS: ICD-10-CM

## 2023-11-20 LAB
ALBUMIN SERPL-MCNC: 4.4 G/DL (ref 3.5–5.2)
ALBUMIN UR-MCNC: <1.2 MG/DL
ALBUMIN/GLOB SERPL: 1.9 G/DL
ALP SERPL-CCNC: 91 U/L (ref 39–117)
ALT SERPL W P-5'-P-CCNC: 26 U/L (ref 1–33)
ANION GAP SERPL CALCULATED.3IONS-SCNC: 9.1 MMOL/L (ref 5–15)
AST SERPL-CCNC: 17 U/L (ref 1–32)
BASOPHILS # BLD AUTO: 0.03 10*3/MM3 (ref 0–0.2)
BASOPHILS NFR BLD AUTO: 0.6 % (ref 0–1.5)
BILIRUB SERPL-MCNC: 0.4 MG/DL (ref 0–1.2)
BUN SERPL-MCNC: 16 MG/DL (ref 6–20)
BUN/CREAT SERPL: 18.4 (ref 7–25)
CALCIUM SPEC-SCNC: 9.3 MG/DL (ref 8.6–10.5)
CHLORIDE SERPL-SCNC: 109 MMOL/L (ref 98–107)
CHOLEST SERPL-MCNC: 164 MG/DL (ref 0–200)
CO2 SERPL-SCNC: 24.9 MMOL/L (ref 22–29)
CREAT SERPL-MCNC: 0.87 MG/DL (ref 0.57–1)
DEPRECATED RDW RBC AUTO: 36.5 FL (ref 37–54)
EGFRCR SERPLBLD CKD-EPI 2021: 77.3 ML/MIN/1.73
EOSINOPHIL # BLD AUTO: 0.11 10*3/MM3 (ref 0–0.4)
EOSINOPHIL NFR BLD AUTO: 2.1 % (ref 0.3–6.2)
ERYTHROCYTE [DISTWIDTH] IN BLOOD BY AUTOMATED COUNT: 11.5 % (ref 12.3–15.4)
GLOBULIN UR ELPH-MCNC: 2.3 GM/DL
GLUCOSE SERPL-MCNC: 123 MG/DL (ref 65–99)
HBA1C MFR BLD: 6.2 % (ref 4.8–5.6)
HCT VFR BLD AUTO: 41.7 % (ref 34–46.6)
HDLC SERPL-MCNC: 44 MG/DL (ref 40–60)
HGB BLD-MCNC: 13.9 G/DL (ref 12–15.9)
IMM GRANULOCYTES # BLD AUTO: 0.01 10*3/MM3 (ref 0–0.05)
IMM GRANULOCYTES NFR BLD AUTO: 0.2 % (ref 0–0.5)
LDLC SERPL CALC-MCNC: 104 MG/DL (ref 0–100)
LDLC/HDLC SERPL: 2.34 {RATIO}
LYMPHOCYTES # BLD AUTO: 1.64 10*3/MM3 (ref 0.7–3.1)
LYMPHOCYTES NFR BLD AUTO: 30.9 % (ref 19.6–45.3)
MCH RBC QN AUTO: 29.7 PG (ref 26.6–33)
MCHC RBC AUTO-ENTMCNC: 33.3 G/DL (ref 31.5–35.7)
MCV RBC AUTO: 89.1 FL (ref 79–97)
MONOCYTES # BLD AUTO: 0.37 10*3/MM3 (ref 0.1–0.9)
MONOCYTES NFR BLD AUTO: 7 % (ref 5–12)
NEUTROPHILS NFR BLD AUTO: 3.15 10*3/MM3 (ref 1.7–7)
NEUTROPHILS NFR BLD AUTO: 59.2 % (ref 42.7–76)
NRBC BLD AUTO-RTO: 0 /100 WBC (ref 0–0.2)
PLATELET # BLD AUTO: 313 10*3/MM3 (ref 140–450)
PMV BLD AUTO: 9.5 FL (ref 6–12)
POTASSIUM SERPL-SCNC: 4.7 MMOL/L (ref 3.5–5.2)
PROT SERPL-MCNC: 6.7 G/DL (ref 6–8.5)
RBC # BLD AUTO: 4.68 10*6/MM3 (ref 3.77–5.28)
SODIUM SERPL-SCNC: 143 MMOL/L (ref 136–145)
TRIGL SERPL-MCNC: 86 MG/DL (ref 0–150)
TSH SERPL DL<=0.05 MIU/L-ACNC: 1.16 UIU/ML (ref 0.27–4.2)
VLDLC SERPL-MCNC: 16 MG/DL (ref 5–40)
WBC NRBC COR # BLD AUTO: 5.31 10*3/MM3 (ref 3.4–10.8)

## 2023-11-20 PROCEDURE — 80061 LIPID PANEL: CPT

## 2023-11-20 PROCEDURE — 83036 HEMOGLOBIN GLYCOSYLATED A1C: CPT

## 2023-11-20 PROCEDURE — 82043 UR ALBUMIN QUANTITATIVE: CPT

## 2023-11-20 PROCEDURE — 80050 GENERAL HEALTH PANEL: CPT

## 2023-11-20 PROCEDURE — 36415 COLL VENOUS BLD VENIPUNCTURE: CPT

## 2023-11-20 RX ORDER — AZITHROMYCIN 250 MG/1
TABLET, FILM COATED ORAL
Qty: 6 TABLET | Refills: 0 | Status: SHIPPED | OUTPATIENT
Start: 2023-11-20

## 2023-11-20 NOTE — PROGRESS NOTES
Chief Complaint  HTN, Diabetes 2, HLD    Subjective            Amanda Cason presents to [facility]  History of Present Illness  PT here for 6 month f/u on HTN, DM2, HLD. PT is followed by Angelica purdy for dm management. Pt has c/o possible sinus infection. Pt state this has been present for approximately 1 wk. Pt has had sinus congestion and drainage. Pt has been taking advil sinus and mucinex with some relief. Pt does not wish to be tested for flu or covid today.     PT is due labs.    PT is due a pap. Pt will schedule in office.     Pt is due mammogram.     Pt is due DM eye exam. Pt was given form to self schedule.    PT is due shingles and flu vaccine. Pt declines and understands the risks of not having.         Past Medical History:   Diagnosis Date    Acid reflux     Arthritis     NECK, HANDS    Deafness in left ear     Diabetes mellitus type 2, noninsulin dependent     Diabetic eye exam 06/23/2015    Last completed-[facility]    Dry eye syndrome     Encounter for diabetic foot exam 11/20/2019    last completed- Done in office durning visit    GERD (gastroesophageal reflux disease) 02/12/2018    Glaucoma     Hyperlipidemia     Pap smear for cervical cancer screening 08/30/2017    Subcutaneous mass of left thumb     Vaginal Pap smear 08/20/2017    For cancer screening    Visit for screening mammogram 11/20/2019    Declined       No Known Allergies     Past Surgical History:   Procedure Laterality Date    APPENDECTOMY      Childhood    COLONOSCOPY N/A 11/04/2021    Procedure: COLONOSCOPY with polypectomy.;  Surgeon: Silvano Garcia MD;  Location: [facility] ENDOSCOPY;  Service: General;  Laterality: N/A;  Transvers polyp    ENDOMETRIAL ABLATION      INCISION AND DRAINAGE OF WOUND Left 7/15/2022    Procedure: LEFT THUMB VOLAR MASS EXCISION;  Surgeon: Sergio Calvin MD;  Location:  [facility];  Service: Orthopedics;  Laterality: Left;    OTHER SURGICAL HISTORY  "Left     Ear Surgery  - x3 hole in ear    ROTATOR CUFF REPAIR Left     SHOULDER LIGAMENT REPAIR Right     TUBAL ABDOMINAL LIGATION  1986        Social History     Tobacco Use    Smoking status: Never    Smokeless tobacco: Never   Substance Use Topics    Alcohol use: Never       Family History   Problem Relation Age of Onset    Diabetes Mother         mellitus    Breast cancer Mother     Diabetes type II Mother         Mellitus    Heart disease Father     Diabetes Sister         Mellitus    Diabetes Brother         Mellitus    Malig Hyperthermia Neg Hx         Current Outpatient Medications on File Prior to Visit   Medication Sig    ascorbic acid (VITAMIN C) 1000 MG tablet Take 1 tablet by mouth Daily.    glipizide (GLUCOTROL XL) 5 MG ER tablet Take 1 tablet by mouth Daily.    lisinopril (PRINIVIL,ZESTRIL) 2.5 MG tablet TAKE 1 TABLET BY MOUTH EVERY DAY    Multiple Vitamins-Minerals (ICAPS PO) Take 1 tablet by mouth 2 (Two) Times a Day. PT TO HOLD FOR SURGERY    Omega-3 Krill Oil 500 MG capsule Take 500 mg by mouth Daily. PT TO HOLD FOR SURGERY    rosuvastatin (CRESTOR) 10 MG tablet TAKE 1 TABLET BY MOUTH EVERYDAY AT BEDTIME    Xigduo XR  MG tablet TAKE 1 TABLET BY MOUTH EVERY DAY    [DISCONTINUED] benzonatate (Tessalon Perles) 100 MG capsule Take 1 capsule by mouth 3 (Three) Times a Day As Needed for Cough. (Patient not taking: Reported on 11/20/2023)     No current facility-administered medications on file prior to visit.       Health Maintenance Due   Topic Date Due    HEMOGLOBIN A1C  05/22/2023       Objective     /64   Pulse 73   Ht 162.6 cm (64\")   Wt 65.3 kg (144 lb)   SpO2 99%   BMI 24.72 kg/m²       Physical Exam  Constitutional:       General: She is not in acute distress.     Appearance: Normal appearance. She is not ill-appearing.   HENT:      Head: Normocephalic and atraumatic.      Comments: Maxillary sinus tenderness upon palpation     Right Ear: Tympanic membrane, ear canal and " external ear normal.      Left Ear: Tympanic membrane, ear canal and external ear normal.      Nose: Nose normal.      Mouth/Throat:      Lips: Pink.      Mouth: Mucous membranes are moist.      Pharynx: Oropharynx is clear.   Cardiovascular:      Rate and Rhythm: Normal rate and regular rhythm.      Heart sounds: Normal heart sounds. No murmur heard.  Pulmonary:      Effort: Pulmonary effort is normal. No respiratory distress.      Breath sounds: Normal breath sounds.   Chest:      Chest wall: No tenderness.   Abdominal:      General: Abdomen is flat. Bowel sounds are normal. There is no distension.      Palpations: Abdomen is soft. There is no mass.      Tenderness: There is no abdominal tenderness. There is no guarding.   Musculoskeletal:         General: No swelling or tenderness. Normal range of motion.      Cervical back: Normal range of motion and neck supple.   Skin:     General: Skin is warm and dry.      Findings: No rash.   Neurological:      General: No focal deficit present.      Mental Status: She is alert and oriented to person, place, and time. Mental status is at baseline.      Gait: Gait normal.   Psychiatric:         Mood and Affect: Mood normal.         Behavior: Behavior normal.         Thought Content: Thought content normal.         Judgment: Judgment normal.       BMI is >= 25 and <30. (Overweight) The following options were offered after discussion;: exercise counseling/recommendations and nutrition counseling/recommendations     Result Review :                           Assessment and Plan        Diagnoses and all orders for this visit:    1. Primary hypertension (Primary)  Comments:  stableon lisinpril 2.5mg, continue  Orders:  -     CBC w AUTO Differential; Future    2. Hyperlipidemia, unspecified hyperlipidemia type  Comments:  stable on crestor 10mg, continue  Orders:  -     Comprehensive metabolic panel; Future  -     Lipid panel; Future    3. Type 2 diabetes mellitus without  complication, without long-term current use of insulin  Comments:  stable on xigduo 10/1000 and glucotrol 5mg, continue, pt to continue f/u with Angelica purdy for mgmt  Orders:  -     Hemoglobin A1c; Future  -     MicroAlbumin, Urine, Random - Urine, Clean Catch; Future    4. Encounter for screening mammogram for malignant neoplasm of breast  -     Mammo Screening Digital Tomosynthesis Bilateral With CAD; Future    5. Screening for thyroid disorder  -     TSH; Future    6. Acute recurrent maxillary sinusitis  -     azithromycin (Zithromax Z-Zachary) 250 MG tablet; Take 2 tablets by mouth on day 1, then 1 tablet daily on days 2-5  Dispense: 6 tablet; Refill: 0              Follow Up     Return in about 6 months (around 5/20/2024).    Patient was given instructions and counseling regarding her condition or for health maintenance advice. Please see specific information pulled into the AVS if appropriate.     Amanda CLEMENTE Cason  reports that she has never smoked. She has never used smokeless tobacco..        DULCE Cabrales

## 2023-11-30 DIAGNOSIS — E78.2 MIXED HYPERLIPIDEMIA: ICD-10-CM

## 2023-11-30 RX ORDER — ROSUVASTATIN CALCIUM 10 MG/1
TABLET, COATED ORAL
Qty: 90 TABLET | Refills: 1 | Status: SHIPPED | OUTPATIENT
Start: 2023-11-30

## 2023-11-30 RX ORDER — LISINOPRIL 2.5 MG/1
TABLET ORAL
Qty: 90 TABLET | Refills: 1 | Status: SHIPPED | OUTPATIENT
Start: 2023-11-30

## 2023-12-08 ENCOUNTER — TELEPHONE (OUTPATIENT)
Dept: DIABETES SERVICES | Facility: HOSPITAL | Age: 58
End: 2023-12-08
Payer: COMMERCIAL

## 2023-12-08 NOTE — TELEPHONE ENCOUNTER
This patient is living in [place] right now so she will have to be seen in our office before I can refill her medications.  I believe someone in the front office spoke to the patient regarding this issue earlier this week

## 2023-12-08 NOTE — TELEPHONE ENCOUNTER
"    Caller: Amanda Cason \"Leatha\"    Relationship: Self    Best call back number: [phone]     Requested Prescriptions: Xigduo XR  MG tablet   glipizide (GLUCOTROL XL) 5 MG ER tablet   Requested Prescriptions      No prescriptions requested or ordered in this encounter        Pharmacy where request should be sent:    [facility]/pharmacy #[id number] - [place], [place] - [address] AT [facility] - [phone]  - [phone] FX  [address], [place] [address]  Phone: [phone]  Fax: [phone]       Last office visit with prescribing clinician: 12/28/2022   Last telemedicine visit with prescribing clinician: 6/22/2023   Next office visit with prescribing clinician: Visit date not found     Additional details provided by patient:     Does the patient have less than a 3 day supply:  [] Yes  [] No    Would you like a call back once the refill request has been completed: [] Yes [] No    If the office needs to give you a call back, can they leave a voicemail: [] Yes [] No    Catalina Fisher Rep   12/08/23 11:39 EST         "

## 2024-01-25 ENCOUNTER — PATIENT MESSAGE (OUTPATIENT)
Dept: DIABETES SERVICES | Facility: HOSPITAL | Age: 59
End: 2024-01-25
Payer: COMMERCIAL

## 2024-01-25 DIAGNOSIS — N18.2 CONTROLLED TYPE 2 DIABETES MELLITUS WITH STAGE 2 CHRONIC KIDNEY DISEASE, WITHOUT LONG-TERM CURRENT USE OF INSULIN: ICD-10-CM

## 2024-01-25 DIAGNOSIS — E11.22 CONTROLLED TYPE 2 DIABETES MELLITUS WITH STAGE 2 CHRONIC KIDNEY DISEASE, WITHOUT LONG-TERM CURRENT USE OF INSULIN: ICD-10-CM

## 2024-01-25 NOTE — TELEPHONE ENCOUNTER
Angelica Carrillo APRN   to Me       12/8/23  4:54 PM  Note     This patient is living in [place] right now so she will have to be seen in our office before I can refill her medications.  I believe someone in the front office spoke to the patient regarding this issue earlier this week

## 2024-01-29 NOTE — TELEPHONE ENCOUNTER
Pt stated she is still residing in [place] due to her daughter and does not know when she will return. Pt has 30 day's left of medication

## 2024-01-30 ENCOUNTER — PATIENT MESSAGE (OUTPATIENT)
Dept: DIABETES SERVICES | Facility: HOSPITAL | Age: 59
End: 2024-01-30
Payer: COMMERCIAL

## 2024-02-05 RX ORDER — GLIPIZIDE 5 MG/1
5 TABLET, FILM COATED, EXTENDED RELEASE ORAL DAILY
Qty: 90 TABLET | Refills: 1 | OUTPATIENT
Start: 2024-02-05

## 2024-04-29 DIAGNOSIS — N18.2 CONTROLLED TYPE 2 DIABETES MELLITUS WITH STAGE 2 CHRONIC KIDNEY DISEASE, WITHOUT LONG-TERM CURRENT USE OF INSULIN: ICD-10-CM

## 2024-04-29 DIAGNOSIS — E11.22 CONTROLLED TYPE 2 DIABETES MELLITUS WITH STAGE 2 CHRONIC KIDNEY DISEASE, WITHOUT LONG-TERM CURRENT USE OF INSULIN: ICD-10-CM

## 2024-05-01 RX ORDER — GLIPIZIDE 5 MG/1
5 TABLET, FILM COATED, EXTENDED RELEASE ORAL DAILY
Qty: 90 TABLET | Refills: 1 | OUTPATIENT
Start: 2024-05-01

## 2024-08-01 DIAGNOSIS — N18.2 CONTROLLED TYPE 2 DIABETES MELLITUS WITH STAGE 2 CHRONIC KIDNEY DISEASE, WITHOUT LONG-TERM CURRENT USE OF INSULIN: ICD-10-CM

## 2024-08-01 DIAGNOSIS — E11.22 CONTROLLED TYPE 2 DIABETES MELLITUS WITH STAGE 2 CHRONIC KIDNEY DISEASE, WITHOUT LONG-TERM CURRENT USE OF INSULIN: ICD-10-CM

## 2024-08-04 RX ORDER — GLIPIZIDE 5 MG/1
5 TABLET, FILM COATED, EXTENDED RELEASE ORAL DAILY
Qty: 90 TABLET | Refills: 1 | OUTPATIENT
Start: 2024-08-04

## 2024-09-20 DIAGNOSIS — E11.22 CONTROLLED TYPE 2 DIABETES MELLITUS WITH STAGE 2 CHRONIC KIDNEY DISEASE, WITHOUT LONG-TERM CURRENT USE OF INSULIN: ICD-10-CM

## 2024-09-20 DIAGNOSIS — N18.2 CONTROLLED TYPE 2 DIABETES MELLITUS WITH STAGE 2 CHRONIC KIDNEY DISEASE, WITHOUT LONG-TERM CURRENT USE OF INSULIN: ICD-10-CM

## 2024-09-20 RX ORDER — GLIPIZIDE 5 MG/1
5 TABLET, FILM COATED, EXTENDED RELEASE ORAL DAILY
Qty: 90 TABLET | Refills: 1 | OUTPATIENT
Start: 2024-09-20

## (undated) DEVICE — WEBRIL* CAST PADDING: Brand: DEROYAL

## (undated) DEVICE — UNDRGLV SURG BIOGEL PUNCTUREINDICATION SZ7 PF STRL

## (undated) DEVICE — DRSNG GZ PETROLTM XEROFORM CURAD 1X8IN STRL

## (undated) DEVICE — RUBBERBAND LF STRL PK/2

## (undated) DEVICE — DRAPE,U/ SHT,SPLIT,PLAS,STERIL: Brand: MEDLINE

## (undated) DEVICE — BNDG ELAS MATRX  2IN 5YD LF STRL

## (undated) DEVICE — SINGLE-USE BIOPSY FORCEPS: Brand: RADIAL JAW 4

## (undated) DEVICE — DISPOSABLE BIPOLAR FORCEPS 4" (10.2CM) JEWELERS, STRAIGHT 0.4MM TIP AND 12 FT. (3.6M) CABLE: Brand: KIRWAN

## (undated) DEVICE — SPNG GZ WOVN 4X4IN 12PLY 10/BX STRL

## (undated) DEVICE — DRAPE,HAND,STERILE: Brand: MEDLINE

## (undated) DEVICE — APPL CHLORAPREP HI/LITE 26ML ORNG

## (undated) DEVICE — COLON KIT: Brand: MEDLINE INDUSTRIES, INC.

## (undated) DEVICE — SOL IRRG H2O PL/BG 1000ML STRL

## (undated) DEVICE — PK ORTHO MINOR TOWER 40

## (undated) DEVICE — GLV SURG BIOGEL LTX PF 7

## (undated) DEVICE — VESSEL LOOPS X-RAY DETECTABLE: Brand: DEROYAL

## (undated) DEVICE — SOL ISO/ALC RUB 70PCT 4OZ

## (undated) DEVICE — SUT PROLN 4/0 FS2 18IN 8683G

## (undated) DEVICE — Device: Brand: DEFENDO AIR/WATER/SUCTION AND BIOPSY VALVE

## (undated) DEVICE — DISPOSABLE TOURNIQUET CUFF SINGLE BLADDER, SINGLE PORT AND QUICK CONNECT CONNECTOR: Brand: COLOR CUFF